# Patient Record
Sex: MALE | Race: BLACK OR AFRICAN AMERICAN | NOT HISPANIC OR LATINO | Employment: UNEMPLOYED
[De-identification: names, ages, dates, MRNs, and addresses within clinical notes are randomized per-mention and may not be internally consistent; named-entity substitution may affect disease eponyms.]

---
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
## 2021-09-07 ENCOUNTER — TELEPHONE (OUTPATIENT)
Dept: FAMILY MEDICINE | Facility: CLINIC | Age: 21
End: 2021-09-07

## 2021-09-07 NOTE — TELEPHONE ENCOUNTER
Reason for call:  Other     Patient called regarding (reason for call): appointment    Additional comments: Returning pt wanting to see Dr. Boo only, for mental health, care connection cannot schedule with Dr. Boo as pt is considered new pt    Phone number to reach patient:  Other phone number:  Brother Brook [phone]    Best Time:  Any     Can we leave a detailed message on this number?  YES    Travel screening: Not Applicable

## 2021-09-09 ENCOUNTER — NURSE TRIAGE (OUTPATIENT)
Dept: NURSING | Facility: CLINIC | Age: 21
End: 2021-09-09

## 2021-09-09 NOTE — TELEPHONE ENCOUNTER
"  \"I need to make an appt for mental health issues.\"   See previous encounter per epic, pt is returning a call to the clinic.\"   Offered triage  Patient declined   Transferred to scheduling for appt but also advised if he needs triaged or to speak with a nurse to call back  Bertha Prado RN [facility]      Reason for Disposition    Requesting regular office appointment    Protocols used: INFORMATION ONLY CALL - NO TRIAGE-A-AH      "

## 2021-09-10 NOTE — TELEPHONE ENCOUNTER
Pt has an appt with Dr. CARDOSO on 09/15/2021 and Dr. MILLER on 10/05/2021    Edilma Castillo/GUS  [place]---[facility]

## 2023-09-08 ENCOUNTER — HOSPITAL ENCOUNTER (EMERGENCY)
Facility: CLINIC | Age: 23
Discharge: HOME | End: 2023-09-08
Attending: STUDENT IN AN ORGANIZED HEALTH CARE EDUCATION/TRAINING PROGRAM | Admitting: STUDENT IN AN ORGANIZED HEALTH CARE EDUCATION/TRAINING PROGRAM
Payer: COMMERCIAL

## 2023-09-08 VITALS
WEIGHT: 281.22 LBS | OXYGEN SATURATION: 100 % | HEART RATE: 107 BPM | TEMPERATURE: 98.3 F | SYSTOLIC BLOOD PRESSURE: 138 MMHG | DIASTOLIC BLOOD PRESSURE: 75 MMHG | RESPIRATION RATE: 18 BRPM

## 2023-09-08 DIAGNOSIS — F20.9 SCHIZOPHRENIA, UNSPECIFIED TYPE (H): ICD-10-CM

## 2023-09-08 DIAGNOSIS — Z76.0 ENCOUNTER FOR MEDICATION REFILL: ICD-10-CM

## 2023-09-08 PROCEDURE — 250N000011 HC RX IP 250 OP 636: Performed by: STUDENT IN AN ORGANIZED HEALTH CARE EDUCATION/TRAINING PROGRAM

## 2023-09-08 PROCEDURE — 96372 THER/PROPH/DIAG INJ SC/IM: CPT | Performed by: STUDENT IN AN ORGANIZED HEALTH CARE EDUCATION/TRAINING PROGRAM

## 2023-09-08 PROCEDURE — 99284 EMERGENCY DEPT VISIT MOD MDM: CPT | Mod: 25

## 2023-09-08 RX ORDER — HALOPERIDOL DECANOATE 100 MG/ML
75 INJECTION INTRAMUSCULAR ONCE
Status: COMPLETED | OUTPATIENT
Start: 2023-09-08 | End: 2023-09-08

## 2023-09-08 RX ADMIN — HALOPERIDOL DECANOATE 75 MG: 100 INJECTION INTRAMUSCULAR at 22:04

## 2023-09-08 ASSESSMENT — ACTIVITIES OF DAILY LIVING (ADL): ADLS_ACUITY_SCORE: 33

## 2023-09-08 NOTE — ED TRIAGE NOTES
Pt arrives for Haldol injection that he was unable to go to this week. ABCs intact6 and Aox4.      Triage Assessment       Row Name 09/08/23 3327       Triage Assessment (Adult)    Airway WDL WDL       Respiratory WDL    Respiratory WDL WDL       Cardiac WDL    Cardiac WDL WDL

## 2023-09-09 NOTE — ED PROVIDER NOTES
"  History     Chief Complaint:  IV Medication     The history is provided by the patient and a parent.      Moe Doe is a 22 year old male with history of paranoid schizophrenia who presents to the ED with his mother for evaluation of an IV medication. Patient's mom reports that she called the doctor and was told that the patient needed a Haldol injection. She states his last dose was in April and the patient does not want to go back for the injection. Mother reports the patient is becoming paranoid, is not sleeping, and is \"getting crazier\". Patient himself reports that he is eating, drinking, and sleeping good. Denies hallucinations. Denies suicidal or homicidal ideations. Denies drug use.     Independent Historian:   Mother - They report as noted above.    Review of External Notes:   Reviewed prior ED note for Haldol injection in 2022.    Medications:    Albuterol  Zyprexa   Haldol     Past Medical History:    Asthma  Behavior disturbance   Epistaxis  Sleep disorder  Paranoid schizophrenia   Agitation  Psychosis   Learning difficulty     Past Surgical History:    ENT surgery    Physical Exam   Patient Vitals for the past 24 hrs:   BP Temp Temp src Pulse Resp SpO2 Weight   09/08/23 1840 138/75 98.3  F (36.8  C) Oral 107 18 100 % --   09/08/23 1837 -- -- -- -- -- -- 127.6 kg (281 lb 3.5 oz)      Physical Exam  GENERAL: Patient well-appearing.  Sitting, playing on his phone.  HEAD: Atraumatic.  EYE: PERRL  NECK: No rigidity  CV: Tachycardic and regular.  PULM: CTAB with good aeration; no retractions, rales, rhonchi, or wheezing  ABD: Soft, nontender, nondistended, no guarding  DERM: No rash. Skin warm and dry  EXTREMITY: Moving all extremities without difficulty. No calf tenderness or peripheral edema      Emergency Department Course   Emergency Department Course & Assessments:     Interventions:  Medications   haloperidol decanoate (HALDOL DECANOATE) injection 75 mg (75 mg Intramuscular $Given 9/8/23 5608)    "   Assessments/Consultations/Discussion of Management or Tests:  ED Course as of 09/08/23 2248   Fri Sep 08, 2023   2100 I obtained history and examined the patient as noted above.    Discussed with ED pharmacist.    Social Determinants of Health affecting care:   Medication compliance.    Disposition:  The patient was discharged to home.     Impression & Plan    Medical Decision Making:  Patient presenting for long-acting Haldol injection.  Per prior ED notes, he was given 75 mg of the long-acting version in the past.    Chronic condition complicating -schizophrenia    DDx acute decompensation, medication noncompliance, among others.    Medical screening exam performed.  Patient in no acute distress.  He is tachycardic but he has no acute complaints.  Tolerating p.o.  Afebrile.    Patient denies being suicidal.  Discussed with the mother thoroughly and she does not feel that he needs a psychiatric evaluation right now, she states he has a psychiatrist to follow-up with.  She states that she just would like him to get the medication and he does not want to take it at the clinic.    Discussed with pharmacist who assisted with the correct medication and dosing.  Given 75 mg IM long-acting Haldol.    Patient monitored.     Patient was evaluated for acute medical emergencies. Based on my clinical assessment, I do not think any further acute management or work-up is required.  Patient stable for discharge. Given strict return precautions. All questions answered. Mother content with plan. Recommended PCP follow-up in 2-3 days.      Diagnosis:    ICD-10-CM    1. Schizophrenia, unspecified type (H)  F20.9       2. Encounter for medication refill  Z76.0          Scribe Disclosure:  STONE BURNETTE, am serving as a scribe at 9:13 PM on 9/8/2023 to document services personally performed by Andrew Padilla MD based on my observations and the provider's statements to me.     9/8/2023   Andrew Padilla MD Foss, Kevin, MD  09/08/23  [id number]

## 2023-09-09 NOTE — DISCHARGE INSTRUCTIONS
Return to the emergency department if symptoms are worsening, become concerning, or for any other concerns.  Follow-up with your psychiatrist next week.

## 2023-09-28 ENCOUNTER — MEDICAL CORRESPONDENCE (OUTPATIENT)
Dept: HEALTH INFORMATION MANAGEMENT | Facility: CLINIC | Age: 23
End: 2023-09-28
Payer: COMMERCIAL

## 2023-10-09 ENCOUNTER — TELEPHONE (OUTPATIENT)
Dept: BEHAVIORAL HEALTH | Facility: CLINIC | Age: 23
End: 2023-10-09
Payer: COMMERCIAL

## 2023-10-09 ENCOUNTER — HOSPITAL ENCOUNTER (EMERGENCY)
Facility: CLINIC | Age: 23
Discharge: PSYCHIATRIC OR SUBSTANCE USE FACILITY | End: 2023-10-13
Attending: EMERGENCY MEDICINE | Admitting: EMERGENCY MEDICINE
Payer: COMMERCIAL

## 2023-10-09 DIAGNOSIS — F23: ICD-10-CM

## 2023-10-09 PROBLEM — F20.9 SCHIZOPHRENIA (H): Status: ACTIVE | Noted: 2022-02-22

## 2023-10-09 PROBLEM — F29 PSYCHOSIS, UNSPECIFIED PSYCHOSIS TYPE (H): Status: ACTIVE | Noted: 2023-09-18

## 2023-10-09 LAB
ALBUMIN SERPL BCG-MCNC: 4 G/DL (ref 3.5–5.2)
ALP SERPL-CCNC: 73 U/L (ref 40–129)
ALT SERPL W P-5'-P-CCNC: 21 U/L (ref 0–70)
ANION GAP SERPL CALCULATED.3IONS-SCNC: 8 MMOL/L (ref 7–15)
AST SERPL W P-5'-P-CCNC: 21 U/L (ref 0–45)
BASO+EOS+MONOS # BLD AUTO: NORMAL 10*3/UL
BASO+EOS+MONOS NFR BLD AUTO: NORMAL %
BASOPHILS # BLD AUTO: 0.1 10E3/UL (ref 0–0.2)
BASOPHILS NFR BLD AUTO: 1 %
BILIRUB SERPL-MCNC: 0.5 MG/DL
BUN SERPL-MCNC: 7.5 MG/DL (ref 6–20)
CALCIUM SERPL-MCNC: 9.2 MG/DL (ref 8.6–10)
CHLORIDE SERPL-SCNC: 106 MMOL/L (ref 98–107)
CREAT SERPL-MCNC: 0.91 MG/DL (ref 0.67–1.17)
DEPRECATED HCO3 PLAS-SCNC: 28 MMOL/L (ref 22–29)
EGFRCR SERPLBLD CKD-EPI 2021: >90 ML/MIN/1.73M2
EOSINOPHIL # BLD AUTO: 0.5 10E3/UL (ref 0–0.7)
EOSINOPHIL NFR BLD AUTO: 6 %
ERYTHROCYTE [DISTWIDTH] IN BLOOD BY AUTOMATED COUNT: 14.9 % (ref 10–15)
FLUAV RNA SPEC QL NAA+PROBE: NEGATIVE
FLUBV RNA RESP QL NAA+PROBE: NEGATIVE
GLUCOSE SERPL-MCNC: 78 MG/DL (ref 70–99)
HCT VFR BLD AUTO: 44.2 % (ref 40–53)
HGB BLD-MCNC: 14.6 G/DL (ref 13.3–17.7)
IMM GRANULOCYTES # BLD: 0 10E3/UL
IMM GRANULOCYTES NFR BLD: 0 %
LYMPHOCYTES # BLD AUTO: 2.5 10E3/UL (ref 0.8–5.3)
LYMPHOCYTES NFR BLD AUTO: 30 %
MCH RBC QN AUTO: 29.4 PG (ref 26.5–33)
MCHC RBC AUTO-ENTMCNC: 33 G/DL (ref 31.5–36.5)
MCV RBC AUTO: 89 FL (ref 78–100)
MONOCYTES # BLD AUTO: 0.8 10E3/UL (ref 0–1.3)
MONOCYTES NFR BLD AUTO: 10 %
NEUTROPHILS # BLD AUTO: 4.4 10E3/UL (ref 1.6–8.3)
NEUTROPHILS NFR BLD AUTO: 53 %
NRBC # BLD AUTO: 0 10E3/UL
NRBC BLD AUTO-RTO: 0 /100
PLATELET # BLD AUTO: 291 10E3/UL (ref 150–450)
POTASSIUM SERPL-SCNC: 4.3 MMOL/L (ref 3.4–5.3)
PROT SERPL-MCNC: 7 G/DL (ref 6.4–8.3)
RBC # BLD AUTO: 4.97 10E6/UL (ref 4.4–5.9)
RSV RNA SPEC NAA+PROBE: NEGATIVE
SARS-COV-2 RNA RESP QL NAA+PROBE: NEGATIVE
SODIUM SERPL-SCNC: 142 MMOL/L (ref 135–145)
WBC # BLD AUTO: 8.2 10E3/UL (ref 4–11)

## 2023-10-09 PROCEDURE — 250N000013 HC RX MED GY IP 250 OP 250 PS 637: Performed by: EMERGENCY MEDICINE

## 2023-10-09 PROCEDURE — 36415 COLL VENOUS BLD VENIPUNCTURE: CPT | Performed by: EMERGENCY MEDICINE

## 2023-10-09 PROCEDURE — 99285 EMERGENCY DEPT VISIT HI MDM: CPT | Mod: 25

## 2023-10-09 PROCEDURE — 87637 SARSCOV2&INF A&B&RSV AMP PRB: CPT | Performed by: EMERGENCY MEDICINE

## 2023-10-09 PROCEDURE — 80053 COMPREHEN METABOLIC PANEL: CPT | Performed by: EMERGENCY MEDICINE

## 2023-10-09 PROCEDURE — 94640 AIRWAY INHALATION TREATMENT: CPT

## 2023-10-09 PROCEDURE — 85025 COMPLETE CBC W/AUTO DIFF WBC: CPT | Performed by: EMERGENCY MEDICINE

## 2023-10-09 RX ORDER — SENNOSIDES A AND B 8.6 MG/1
8.6 TABLET, FILM COATED ORAL 2 TIMES DAILY PRN
Status: ON HOLD | COMMUNITY
Start: 2023-10-02 | End: 2023-11-06

## 2023-10-09 RX ORDER — ACETAMINOPHEN 500 MG
1000 TABLET ORAL ONCE
Status: COMPLETED | OUTPATIENT
Start: 2023-10-09 | End: 2023-10-09

## 2023-10-09 RX ORDER — FLUTICASONE PROPIONATE 50 MCG
1 SPRAY, SUSPENSION (ML) NASAL 2 TIMES DAILY
Status: DISCONTINUED | OUTPATIENT
Start: 2023-10-09 | End: 2023-10-13 | Stop reason: HOSPADM

## 2023-10-09 RX ORDER — OLANZAPINE 5 MG/1
10 TABLET, ORALLY DISINTEGRATING ORAL 2 TIMES DAILY PRN
Status: DISCONTINUED | OUTPATIENT
Start: 2023-10-09 | End: 2023-10-13 | Stop reason: HOSPADM

## 2023-10-09 RX ORDER — FLUTICASONE PROPIONATE 50 MCG
1 SPRAY, SUSPENSION (ML) NASAL 2 TIMES DAILY
Status: ON HOLD | COMMUNITY
Start: 2022-08-15 | End: 2023-11-06

## 2023-10-09 RX ORDER — HALOPERIDOL DECANOATE 100 MG/ML
100 INJECTION INTRAMUSCULAR
Status: ON HOLD | COMMUNITY
Start: 2023-10-06 | End: 2023-11-06

## 2023-10-09 RX ORDER — POLYETHYLENE GLYCOL 3350 17 G/17G
17 POWDER, FOR SOLUTION ORAL DAILY
Status: ON HOLD | COMMUNITY
Start: 2023-09-30 | End: 2023-11-06

## 2023-10-09 RX ORDER — FLUTICASONE FUROATE AND VILANTEROL 200; 25 UG/1; UG/1
1 POWDER RESPIRATORY (INHALATION) DAILY
Status: DISCONTINUED | OUTPATIENT
Start: 2023-10-09 | End: 2023-10-13 | Stop reason: HOSPADM

## 2023-10-09 RX ORDER — POLYETHYLENE GLYCOL 3350 17 G/17G
17 POWDER, FOR SOLUTION ORAL DAILY
Status: DISCONTINUED | OUTPATIENT
Start: 2023-10-10 | End: 2023-10-13 | Stop reason: HOSPADM

## 2023-10-09 RX ORDER — BUDESONIDE AND FORMOTEROL FUMARATE DIHYDRATE 160; 4.5 UG/1; UG/1
2 AEROSOL RESPIRATORY (INHALATION)
Status: ON HOLD | COMMUNITY
End: 2023-11-06

## 2023-10-09 RX ORDER — ERGOCALCIFEROL 1.25 MG/1
50000 CAPSULE, LIQUID FILLED ORAL WEEKLY
Status: ON HOLD | COMMUNITY
Start: 2023-10-05 | End: 2023-11-06

## 2023-10-09 RX ORDER — SENNOSIDES 8.6 MG
1 TABLET ORAL 2 TIMES DAILY PRN
Status: DISCONTINUED | OUTPATIENT
Start: 2023-10-09 | End: 2023-10-13 | Stop reason: HOSPADM

## 2023-10-09 RX ADMIN — FLUTICASONE FUROATE AND VILANTEROL TRIFENATATE 1 PUFF: 200; 25 POWDER RESPIRATORY (INHALATION) at 15:47

## 2023-10-09 RX ADMIN — ACETAMINOPHEN TAB 500 MG 1000 MG: 500 TAB at 01:46

## 2023-10-09 RX ADMIN — OLANZAPINE 10 MG: 5 TABLET, ORALLY DISINTEGRATING ORAL at 16:00

## 2023-10-09 RX ADMIN — OLANZAPINE 10 MG: 5 TABLET, ORALLY DISINTEGRATING ORAL at 01:45

## 2023-10-09 RX ADMIN — FLUTICASONE PROPIONATE 1 SPRAY: 50 SPRAY, METERED NASAL at 21:46

## 2023-10-09 ASSESSMENT — ACTIVITIES OF DAILY LIVING (ADL)
ADLS_ACUITY_SCORE: 35

## 2023-10-09 NOTE — ED NOTES
RN ED Mental Health Handoff Note    STERLING    Does patient require 1:1? No    Hold and rights been given and documented for patient: Yes    Is the patient in BH scrubs? No -Patient has own hat    Has the patient been searched? Yes    Is the 15 minute observation tool up to date? Yes    Was patient issued a welcome folder? Yes    Room check completed this shift: Yes    PSS3 and [place] Assessment/Reassessment this shift:    PSS-3      Date and Time Over the past 2 weeks have you felt down, depressed, or hopeless? Over the past 2 weeks have you had thoughts of killing yourself? Have you ever attempted to kill yourself? When did this last happen? User   10/09/23 0108 yes no no -- MM            Behavioral status of patient: Green    Code 21 called this shift? No    Use of restraints/seclusion this shift? No    Most recent vital signs:  Temp: 97.5  F (36.4  C)   BP: (!) 156/85 Pulse: 100   Resp: 18 SpO2: 100 %        Medications:  Scheduled medication compliance? Yes    PRN Meds administered this shift? Yes    Medications   OLANZapine zydis (zyPREXA) ODT tab 10 mg (10 mg Oral $Given 10/9/23 0145)   acetaminophen (TYLENOL) tablet 1,000 mg (1,000 mg Oral $Given 10/9/23 0146)         ADLs    Meal Provided this shift? No    Hygiene items provided? No    ADLs completed? No    Date of last shower: Unknown    Any significant events this shift? No    Any information that would be helpful in caring for this patient?  N/A    Family present/updated? No    Location of patient's belongings: DEC    Critical Care Minutes:  Does the patient need critical care minutes documented? No

## 2023-10-09 NOTE — PHARMACY-ADMISSION MEDICATION HISTORY
Pharmacist Admission Medication History    Admission medication history is complete. The information provided in this note is only as accurate as the sources available at the time of the update.    Information Source(s[facility]/A    Pertinent Information: Patient currently refusing to particpate in DEC assessment and so did not attempt med hx interview. Pt was just discharged from [facility] on 10/2/23 after being admitted there since 9/16/23.   List updated using [facility] discharge records. Unable to verify last doses of medications.    Based on chart review, pt did receive his Haldol Deconate injection 10/6 based on home care visit in chart on 10/6/23. Next due in 4 weeks.     Changes made to PTA medication list:  Added: All PTA meds were added  Deleted: albuterol neb, olanzapine, APAP  Changed: None    Medication Affordability:  Not including over the counter (OTC) medications, was there a time in the past 3 months when you did not take your medications as prescribed because of cost?: Unable to Assess    Allergies reviewed with patient and updates made in EHR: unable to assess    Medication History Completed By:   Lamar Alston, PharmD, [facility]   Emergency Medicine Pharmacist  [phone] or Joni  October 9, 2023    PTA Med List   Medication Sig Last Dose    budesonide-formoterol (SYMBICORT) 160-4.5 MCG/ACT Inhaler Inhale 2 puffs into the lungs two times daily Past Week at -    fluticasone (FLONASE) 50 MCG/ACT nasal spray Spray 1 spray into both nostrils 2 times daily Past Week at -    haloperidol decanoate (HALDOL DECANOATE) 100 MG/ML injection Inject 100 mg into the muscle every 28 (twenty-eight) days 10/8/2023 at 1035    polyethylene glycol (MIRALAX) 17 GM/Dose powder Take 17 g by mouth daily Past Week at -    senna (SENOKOT) 8.6 MG tablet Take 8.6 mg by mouth 2 times daily as needed for constipation prn at prn    vitamin D2 (ERGOCALCIFEROL) 46409 units (1250 mcg)  capsule Take 50,000 Units by mouth once a week Past Week at -
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
## 2023-10-09 NOTE — PLAN OF CARE
Moe Doe  October 9, 2023  Plan of Care Hand-off Note     Patient Care Path: inpatient mental health    Plan for Care:   Pt is a 22 year old male with a history of schizophrenia who is brought into to ED by father due to decompensation in MH since recent discharge from [facility] last week. Pt is presenting as non-compliant, requesting to return home, showing bizarre behavior, odd responses such as laughing out loud inappropriately, disorganized, verbally abrasive and having active psychosis concerns. Per collateral, pt has been making ideation comments and has had increased aggression in the home by breaking items and punching walls. Pt is currently on a commitment with [facility], who was contacted today and supports admission for MH though recommended 72HH hold until paperwork can be finished to support such care. CW is requesting [facility] for continuation of care, though is aware with 72HH pt can be placed aware. Intake was called and is aware of [facility] preference per commitment . Pt displays the following risk factors that support IP admission: Recent discharge from hospital, psychosis concerns and large decompensation in behavior. Pt is unable to engage in safety planning to mitigate risk level in a non-secure setting. Lower levels of care have not been effective in mitigating risk. Due to this IP is the least restrictive option of care for pt. Pt should remain in IP until deemed safe to return to the community and engage in OP  supports.    Identified Goals and Safety Issues: Goals: Stabilize in psychosis symptoms, show ability to maintain fluent conversation and understanding of needs, stabilize in medications, reduce verbal and physical aggression. Seek out care through [facility] in-patient services. Safety: History of physical aggression towards others through punching/ hitting. Active abrasive verbal aggression, suicidal ideation per collateral, disorganized speech, active psychosis symptoms, on a  commitment for MI. Recent destruction towards walls/ doors in home.  t.    Overview:  Commitment  With Mental Health Resources: Maryam Miranda [phone]   Father of Patient,Jostin, [phone]        Legal Status: Legal Status at Admission: Commitment (ED will place pt on 72HH once STERLING is up, Hold requested by county until they can submit paperwork to support admission for MH.)    Psychiatry Consult: N/A       Updated  MD and RN regarding plan of care.          Angelina Ding LPC

## 2023-10-09 NOTE — ED NOTES
RN ED Mental Health Handoff Note    72 hour hold; working on commitment     Does patient require 1:1? No    Hold and rights been given and documented for patient: Yes    Is the patient in  scrubs? Yes    Has the patient been searched? Yes    Is the 15 minute observation tool up to date? Yes    Was patient issued a welcome folder? Yes    Room check completed this shift: Yes    PSS3 and [place] Assessment/Reassessment this shift:    PSS-3      Date and Time Over the past 2 weeks have you felt down, depressed, or hopeless? Over the past 2 weeks have you had thoughts of killing yourself? Have you ever attempted to kill yourself? When did this last happen? User   10/09/23 0108 yes no no -- MM          C-SSRS ([place])      Date and Time Q1 Wished to be Dead (Past Month) Q2 Suicidal Thoughts (Past Month) Q3 Suicidal Thought Method Q4 Suicidal Intent without Specific Plan Q5 Suicide Intent with Specific Plan Q6 Suicide Behavior (Lifetime) Within the Past 3 Months? RETIRED: Level of Risk per Screen Screening Not Complete User   10/09/23 1157 -- -- -- -- -- no -- -- -- NG   10/09/23 1022 yes yes yes no no -- -- -- -- NG            Behavioral status of patient: Green    Code 21 called this shift? No    Use of restraints/seclusion this shift? No    Most recent vital signs:  Temp: 97.5  F (36.4  C)   BP: 125/85 Pulse: 98   Resp: 12 SpO2: 100 % O2 Device: None (Room air)      Medications:  Scheduled medication compliance? Yes    PRN Meds administered this shift? Yes    Medications   OLANZapine zydis (zyPREXA) ODT tab 10 mg (10 mg Oral $Given 10/9/23 1600)   fluticasone-vilanterol (BREO ELLIPTA) 200-25 MCG/ACT inhaler 1 puff (1 puff Inhalation $Given 10/9/23 6137)   fluticasone (FLONASE) 50 MCG/ACT spray 1 spray (has no administration in time range)   polyethylene glycol (MIRALAX) Packet 17 g (has no administration in time range)   sennosides (SENOKOT) tablet 1 tablet (has no administration in time range)   acetaminophen  (TYLENOL) tablet 1,000 mg (1,000 mg Oral $Given 10/9/23 6631)         ADLs    Meal Provided this shift? Yes    Hygiene items provided? Yes    ADLs completed? Yes    Date of last shower: unknown; attempted; not enough security lack of staff; should have enough tomorrow morning.    Any significant events this shift? No    Any information that would be helpful in caring for this patient?  Ear complaints resolved. Offer options, enjoys snacks (enjoys rice crispy bars), keep back to door, calming words. Father helps calm, very supportive. Talks to self, enjoys TV.  Heath may call; good rapport with pt.     Family present/updated? Yes    Location of patient's belongings: room 5    Critical Care Minutes:  Does the patient need critical care minutes documented? No
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
## 2023-10-09 NOTE — ED NOTES
Patient cooperative with room change, security at bedside searched/wanded and belongings locked in DEC office. Sitter in place, STERLING hold information given. Blood drawn, patient seemed to be having internal conversation and laughing inappropriately.

## 2023-10-09 NOTE — ED TRIAGE NOTES
"Pt presents to triage with c/o mental health problem. Pt not making much sense in triage. Pt sat down said \"I have a problem with my hearing. Heroin, ever since I was born.\" Pt very off topic, asking RN about music and movie. Endorses SI, but will not elaborate. Denies drugs or alcohol. Also endorses ear pain.      "

## 2023-10-09 NOTE — ED NOTES
10/9/2023  Moe Doe 2000     Writer consulted with ED provider, Art- nurse of duty,  on this date at  6:30 AM. It was determined that pt would not benefit from assessment at this time due to Pt unable able to participate in assessment.  Art indicated that a DEC assessment at this time would not be very effective.  It was agreed that ED staff will call later when they want to attempt assessment.     ED will call DEC at [phone] when pt is ready and able to participate in assessment.     OR DEC order has been closed at this time.    Jocelyn Kehr Sparby, [facility], LADC

## 2023-10-09 NOTE — ED PROVIDER NOTES
At signout pt was awaiting DEC evaluation.     11:13 AM: DEC called to report assessment. Pt is clearly disorganized and meets criteria for psychiatric admission.  DEC is working to reach out to the county to determine patient's commitment status and if further hold status is necessary while awaiting psychiatric bed placement here in the ER.     Inderjit Costello MD  10/09/23 [id number]

## 2023-10-09 NOTE — ED NOTES
Angelina ESPINOZA updated writer. Pt to go to [facility] per doretha Joe. Pt has good rapport with her and pt has hx at [facility].  Also working on commitment.

## 2023-10-09 NOTE — TELEPHONE ENCOUNTER
S: [facility] ED , DEC  Khadra  calling at 11:30 PM about a 22 year old/Male presenting with Psychosis     B: Pt arrived via Family. Presenting problem, stressors: Pt BIB to ED by father for active psychosis.  Pt is talking to himself, hitting himself and presenting as labile, disorganized, with bizarre behavior.  Pt was just dsc from [facility] 10/2/23 and Pt's  [facility] [place] [facility].   is working on revoking PD on Pt's commitment and Pt is currently on a 72 HH due to attempting to elope from ED.  Pt currently refusing to cooperate on labs.      Pt affect in ED: Labile  Pt Dx: Schizophrenia  Previous IPMH hx? Yes: just dsc from [facility] 10/2/23  Pt unable to be assessed for SI due to current presentation  but was telling people that he wanted to kill himself  Hx of suicide attempt? No  Pt unable to be assessed for SIB due to current presentation    Pt unable to be assessed for HI due to current presentation    Pt endorses auditory hallucinations  and endorses visual hallucination .   Pt RARS Score: 9    Hx of aggression/violence, sexual offenses, legal concerns, Epic care plan? describe: Hx of hitting people; punching  Current concerns for aggression this visit? No  Does pt have a history of Civil Commitment? Yes, currently committed, revocation pending   Is Pt their own guardian? Yes    Pt is prescribed medication. Is patient medication compliant? Yes Pt has injections  Pt endorses OP services: Psychiatrist and   CD concerns: Actively using/consuming Delta 8 THC  Acute or chronic medical concerns: Asthma with an inhaler as needed  Does Pt present with specific needs, assistive devices, or exclusionary criteria? None      Pt is ambulatory  Pt is able to perform ADLs independently      A: Pt to be reviewed for [facility] admission.72 HH  10/09/23 11:35 AM   Preferred placement: Statewide    COVID Symptoms: No  If yes, COVID test required   Utox: Ordered, not yet collected   CMP: Ordered,  not yet collected   CBC: Ordered, not yet collected   HCG: N/A    R: Patient cleared and ready for behavioral bed placement: Yes  Pt placed on IP worklist? Yes    Does Patient need a Transfer Center request created? Yes, writer completed Transfer Center request at:  11:49 AM    [facility] Bed Call Log 10/9/23 @4PM:   Intake has called facilities that have not updated the bed status within the last 12 hours.                               [facility] is at capacity.              [facility] is posting 0 beds. [phone]. Per call at 7:13 am to [facility], they are full.   [facility] is posting 0 beds. Negative covid required.                 [facility] is posting 0 bed. Negative covid required. [phone].  per call at 7:41 am to [place], they are at cap.    [facility] is posting 0 beds.        [facility] is posting 0 beds. [phone].  Per call with Chaya, no beds available   [facility] is posting 1 bed for young adults ages 18-25. Call for details. Negative covid.  [phone]. Per call @6:09 PM no psychosis and F bed  [facility] is posting 0 beds           [facility] ([facility] is posting 0 beds.          [facility] is posting 3 beds. LOW acuity ONLY. Mixed unit 12+. Negative covid- [phone].     [facility] has 0 beds posted. No aggression. Negative Covid. Low acuity.      [facility] is posting 0 beds. Negative covid. [phone].  per call at 7:18 am,  said NOT to leave a message; no option to speak with a live rep    [facility] ([place]) is posting 4 beds. Low acuity only. Negative covid.  [phone].  Pt is too acute.  [facility] is posting 2 beds. Low acuity. No current aggression.  [phone]  Pt is too acute.  [facility] ([place]) is posting 1 bed available. Negative covid.  [phone].   Mood disorder, Very low acuity.   Pt is too acute.  [facility] is posting 1 bed. Low  acuity. 72 HH hold preferred. Negative covid required. [phone] ; per call at 7:20 am to Randi, they are at cap.   [facility] ([place]) is posting 1 bed.  Low acuity only. Negative covid.  [phone].    Pt is too acute.     [facility] in [place] is posting 5 beds.  Negative covid required.   Vol only, No history of aggression, violence, or assault. No sexual offenders. No 72 HH holds. [phone].   Pt is too acute and not Vol  [facility] is posting 8 beds. Negative covid required.  (Must have the cognitive ability to do programming. No aggressive or violent behavior or recent HX in the last 2 yrs. MH must be primary.) Always low acuity. [phone]    [facility] has 0 beds posted. Negative covid required.  Low acuity only. Violence and aggression capped.  [phone]. Low acuity only.     [facility] is posting 1 bed. Low acuity, Negative covid required.  [phone]. Per call at 7:23 am to Edgar, they are at cap but we can call later.    [facility] is posting 4 beds. Negative covid required.  Vol only. Combined adolescent and adult unit. No aggressive or violent behavior. No registered sex offenders.  [phone]. Per call at 7:24 am to Zoila, they have  a couple beds.   Pt is too acute and not Vol  [facility] posting 0 bed. Negative covid required.  [phone] ; per Veronica ANS, they are at cap (per Theodora's call at 6 am).   [facility]. Negative covid. LOW acuity. (No lines, drains, or tubes, oxygen, CPAP, IV, etc.). [phone].  per call at 7:28 am to Veronica, they  potentially  have 1 bed avail.    [facility] is posting 4 beds. No covid test required.  [phone]. Per call at 7:30 am, phone rang numerous times with no answer.  out of state  [facility] ([place]) is posting 5 beds. Negative covid. (No. lines, drains, or tubes, oxygen, CPAP, IV, etc.).  [phone] ; per call at 7:32 am to Rosario, they have 4 low acuity beds avail.  Pt needs to have a ride home        Pt remains on the work list pending appropriate bed availability.

## 2023-10-09 NOTE — ED NOTES
OLGA Alfaro. She attempted to speak with pt. Pt refused and refused to open eyes for assessment and acting asleep. Writer offered snacks and inquired any other needs. Pt accepted snacks but minimal interaction with DEC. Father arrived at bedside at this time and is encouraging son to participate in discussion.

## 2023-10-09 NOTE — ED PROVIDER NOTES
"  History     Chief Complaint:  Mental Health Problem       HPI   Moe Doe is a 22 year old male with history of schizophrenia who presents for mental health evaluation and ear pain.  The patient states that he has been having L. ear pain for the last few days. The patient denies any drug or alcohol usage. States that he used to use \"delta 8\" and states that he hasn't used it in a long time. He admits to visual hallucinations \"I see smoke.\"  He states he wants to hurt himself by \"jumping on a couch.\"  He states \"I don't know if I want to die, maybe I do.\" No fever, cough or other symptoms.     Independent Historian:   The father states that he is worried about the patients safety and the safety of those around him. He states that the patient lives with his mom and today, mom called father and asked him to be taken to ED. The father states that en route, the patient urinated on the curb outside as well as opened the car door while it was moving. He is worried that the patient is not sleeping and would like him to get psychiatric help. Patient was given haldol IM injection on 10/6 at home visit which he receives monthly.    Review of External Notes:   I reviewed the home care visit note from 10/6 where he got 100 mg of Haldol.      Administered Medications - Active Administered Medications - up to 3 most recent administrations  Active Administered Medications - up to 3 most recent administrations  Medication Order MAR Action Action Date Dose Rate Site   haloperidol decanoate (HALDOL DECANOATE) 100 mg/mL IM solution   Disp-1 mL, R-0, Inject 1 mL (100 mg) into a muscle every 4 weeks. Indications: Schizophrenia, Normal  Given 10/06/2023 10:35 AM  mg   Left Deltoid           Medications:    Zyprexa   Albuterol   Haldol     Past Medical History:    Asthma   Schizophrenia   Psychosis   Sleep disorder   Behavior disturbance     Past Surgical History:    ENT surgery      Physical Exam   Patient Vitals for the past " "24 hrs:   BP Temp Pulse Resp SpO2   10/09/23 0107 (!) 156/85 97.5  F (36.4  C) 100 18 100 %        Physical Exam  Nursing note and vitals reviewed.  Constitutional: Well nourished.  Eyes: Conjunctiva normal.  Pupils are equal, round, and reactive to light.   ENT: Nose normal. Mucous membranes pink and moist.  L. TM not visualized 2/2 to cerumen impaction. R. TM normal. No posterior oropharyngeal erythema  Neck: Normal range of motion.  CVS: Normal rate, regular rhythm.  Normal heart sounds.    Pulmonary: Lungs clear to auscultation bilaterally. No wheezes/rales/rhonchi.  GI: Abdomen soft. Nontender, nondistended. No rigidity or guarding.    MSK: Moves all extremities  Neuro: Alert. Follows simple commands.  Skin: Skin is warm and dry. No rash noted.   Psychiatric: Flat affect, admits to visual hallucinations (\"seeing smoke.\")    Emergency Department Course     Laboratory:  Labs Ordered and Resulted from Time of ED Arrival to Time of ED Departure - No data to display       Emergency Department Course & Assessments:       Interventions:  Medications   acetaminophen (TYLENOL) tablet 1,000 mg (has no administration in time range)   OLANZapine zydis (zyPREXA) ODT tab 10 mg (has no administration in time range)      Assessments:  0022 I consulted with the patient and obtained history as shown above   I rechecked the patient and explained exam results       Consultations/Discussion of Management or Tests:  None        Social Determinants of Health affecting care:   None    Disposition:  Care of the patient was transferred to my colleague Dr. Costello pending DEC.     Impression & Plan      Medical Decision Making:  Patient is a 22-year-old male presenting for mental health evaluation.  He also complains of left ear pain on arrival.  He is nontoxic, cooperative at bedside.  He has a flat affect and admits to visual hallucinations.  Father expresses that he tried to jump out of the car on the way here and expresses concern " for mental health decompensation.  Dpubt metabolic derangements or toxidrome.  Patient was medically cleared.  Regarding his ear complaints, noted cerumen impaction.  I did discuss recommendation for irrigation of patient's ear though he is adamantly declining.  Father expressed understanding that cannot exclude underlying otitis media/perforation.  No evidence to suggest otitis externa/mastoiditis based on exam.  He is signed out to my partner pending DEC evaluation.  He is currently on an STERLING.    Diagnosis:    ICD-10-CM    1. Acute schizophrenic episode (H)  F23            Discharge Medications:  New Prescriptions    No medications on file      Scribe Disclosure:  I, Niko Parr, am serving as a scribe at 1:36 AM on 10/9/2023 to document services personally performed by Delilah Madison DO based on my observations and the provider's statements to me.     10/9/2023   Delilah Madison DO McDonald, Lindsey E, DO  10/09/23 1836

## 2023-10-09 NOTE — CONSULTS
Diagnostic Evaluation Consultation  Crisis Assessment    Patient Name: Moe Doe  Age:  22 year old  Legal Sex: male  Gender Identity: male  Pronouns:   Race: Black or   Ethnicity: Not  or   Language: English      Patient was assessed: Virtual: [employer] Crisis Assessment Start Time: 1011 (10:11AM-10:16AM) Crisis Assessment Stop Time: 1051 (10:19AM-10:30AM and again from 10:49AM-10:51AM)  Patient location: [facility] EMERGENCY DEPT                             ED06    Referral Data and Chief Complaint  Moe Doe presents to the ED with family/friends. Patient is presenting to the ED for the following concerns: Verbal agitation, Suicidal ideation, Significant behavioral change.   Factors that make the mental health crisis life threatening or complex are:  Father brought pt into the ED after pt has had large change in presntation, with concerns for psychosis and suicidal ideation..      Informed Consent and Assessment Methods  Explained the crisis assessment process, including applicable information disclosures and limits to confidentiality, assessed understanding of the process, and obtained consent to proceed with the assessment.  Assessment methods included conducting a formal interview with patient, review of medical records, collaboration with medical staff, and obtaining relevant collateral information from family and community providers when available.  : done (Pt is not appropriate to sign BUTCH's due to presentation)     Patient response to interventions: no evidence of understanding, refused to respond  Coping skills were attempted to reduce the crisis:  Unknown. Pt is often refusing to respond or participate. Pt also shows limited insight. No evidence of benefits with validation, rapport gaining, reassurance or education.     History of the Crisis   Information was attempted to be gathered from patient, though due to presentation and decompensation, information  gathered was limited. Collateral from Commitment , parents and patient were obtained in addition to observation with patient. This past evening, pt s mother called pt s father, requesting help bringing pt to ED due to decompensation in behavior. Since being in ED, pt has shown psychosis concerns such as making odd gestures, reporting visual hallucinations and inappropriately laughing. Pt also was reporting SI to ED staff and father.  Pt has been verbally abrasive and refusing most services and labs. During this encounter with LMHP, per nurse, pt had just eaten breakfast and was awake, though when writer went into room, pt placed hat on head, had eyes closed and refused to respond to information outside of  what is this . Pt became non responsive and looked as though he was sleeping. LMHP reattempted again with help of nurse. Pt was brought in snacks and seen sitting up, looking about the room and eating. When nurse was present, pt asked nurse if LMHP was  telling the truth  nurse encouraged him to participate. During this time, pt was asked about SI, pt reported no, then when asked about comments made to family and ED staff, pt then laughed and looked away, continuing to eat. When asked about HI pt stated  yes no, yes, no yes,no  and was unable to confirm answer. LMHP attempted to gain rapport with pt though was limited as pt was not willing to engage and would state he doesn t know where he lives. At one point, pt s father came in the room to help assist with pt to comply with DEC though pt then became slightly agitated stating  why is she asking me personal information  and stated  she is racists  in regards to LMHP. Pts father had to encourage him to participate. Once father left room, LMHP continued to attempt to with patient, though pt would respond with  none of your fucking business  or  you stupid fucking ass . LMHP then decided to observe patient, rather than asking questions due to agitation.  Pt was seen looking about the room, then aggressively turned dead toward the wall and stated  I am going to find you , pt then began inappropriately laughing and was seen engaging in hand movements, almost similar to ASL, though family denied pt knowing this. When LMHP asked what pt was doing, he began putting his finger over his mouth to gesture  shhh  and continued with his hand movements and looking about the room.    Brief Psychosocial History  Family:  Single, Children no  Support System:  Parent(s), Sibling(s)  Employment Status:  unemployed  Source of Income:  other (see comments), public assistance (support from family)  Financial Environmental Concerns:  unemployed  Current Hobbies:   (pt unable to note to this LMHP.)  Barriers in Personal Life:  mental health concerns, behavioral concerns    Significant Clinical History  Current Anxiety Symptoms:     Current Depression/Trauma:  difficulty concentrating, avoidance, thoughts of death/suicide  Current Somatic Symptoms:  anxious  Current Psychosis/Thought Disturbance:     Current Eating Symptoms:   (unable to assess, though pt was seen eating throughout encounter)  Chemical Use History:  Alcohol: None  Benzodiazepines: None  Opiates: None  Cocaine: None  Marijuana:  (Pt reported Delta use, though details on last date of use or method are unknown.)  Last Use::  (Unknown)  Other Use: None  Withdrawal Symptoms:  (Unable to assess.)  Addictions:  (Unable to assess.)   Past diagnosis:  Schizophrenia  Family history:  No known history of mental health or chemical health concerns  Past treatment:  Case management, Civil Commitment, Psychiatric Medication Management, Inpatient Hospitalization, Other (Nurse coming into home for medications)  Details of most recent treatment:  Pt is currently on a commitment through [facility],  is Maryam Miranda [phone]. Pt is suppose to be taking injection medications and attend out-patient supports per commitment  "requirements. Pt has a psychiatrist and has a nurse that comes into the home for his injections.  Other relevant history:  Pt was recently admitted for MH through [facility], Discharged on 10.2.23. Another prior admission through [facility] in September of 2021.  Pt has history of commitment, which is still active. Pt has history of medications and .       Collateral Information  Is there collateral information: Yes     Collateral information name, relationship, phone number:  Morgan Frankel's Father at [phone]    What happened today: Father brought pt at 1AM in ED. Pt has been staying with his mother, though father speaks frequently with them. Mother called father, noting concerns and pt needing to be seen in ED. Pt was trying to jump out of the car, opening windows and doors on way to ED. Father would have to pull over and \"lie to him\" about where they were going to get him to go to hospital. Per father, since being in ED, pt continues to ask to return home.     What is different about patient's functioning: Pt was discharged last week from in-pt mental health. Pt has been getting injection for his MH. Father noted medications have \"not helped. Pt has not been sleeping. Pt has been punching walls, pacing around the house, is not dressing properly and asking questions and making comments that don't make sense. Father reports \"he is in state of crisis\". Per father, pt is \"not logical\" and is not safe to return home due to presentation.     Concern about alcohol/drug use:  Unknown     What do you think the patient needs:  Admission for MH.     Has patient made comments about wanting to kill themselves/others: yes    If d/c is recommended, can they take part in safety/aftercare planning:  no    Additional collateral information:  Pt has history of physical aggression towards family and breaking items in the house such as door. Pt has a commitment . Father believes pt needs help with MH and needing " "admission.  is [facility] through [facility]; [phone]. Pt has a psychiatrist. Unknown if pt uses any substances now, though pt told ED staff some history of this. Pt has history of asthma, and uses inhaler. Pt is not currently working, history of working at [employer] though cannot maintain work due to MI. [facility] spoke pts mother, alley celestin at [phone]. Pt hasn't been sleeping, listening to loud music, Pt often is unable to understand questions when asked. Pt has been talking to self, pacing around the house. Mother noted pt has historically been taking medications (haldol) and was \"doing well' though over the past couple of months has been declining with new prescribed medications. Since discharge from hospital, per mother, Pt has been hearing voices and putting fingers in his ears to stop this. Mother called pt's father due to pt's presentation. Mother noted pt would benefit from increased physical labs to ensure he is physically clear and medication of haldol as he did well on this in the past. Mother also reports Pt has been kicking the wall. [facility] spoke with pts JUAN Miranda [phone] who noted pt was recently discharged from [facility]. CW noted she will work on revocation for in-patient care for MH though if pt is attempting to leave ED, okay for 72HH paperwork to be placed. CW shared that pt would benefit from [facility] care as he historically has gone there for placement.     Risk Assessment  [place] Suicide Severity Rating Scale Full Clinical Version:10.9.23  Suicidal Ideation  Q1 Wish to be Dead (Lifetime): Yes (Pt denies though collateral reports pt was making such comments as does ED in regards to ideations)  Q2 Non-Specific Active Suicidal Thoughts (Lifetime): Yes (pt denies though collateral from ED and family reports otherwise.)  3. Active Suicidal Ideation with any Methods (Not Plan) Without Intent to Act (Lifetime): Yes (Per family, pt has made comments about wanting to jump " to end life)  Q5 Active Suicidal Ideation with Specific Plan and Intent (Lifetime): No (pt denies)  Q6 Suicide Behavior (Lifetime): no     Suicidal Behavior (Lifetime)  Actual Attempt (Lifetime): No  Has subject engaged in non-suicidal self-injurious behavior? (Lifetime): No  Interrupted Attempts (Lifetime): No  Aborted or Self-Interrupted Attempt (Lifetime): No  Preparatory Acts or Behavior (Lifetime): No    [place] Suicide Severity Rating Scale Recent: 10.9.23  Suicidal Ideation (Recent)  Q1 Wished to be Dead (Past Month): yes (Pt denies, though per collateral and ED, pt has made ideation comments.)  Q2 Suicidal Thoughts (Past Month): yes (Pt denies this though per collateral from family and ED, pt has made comments)  Q3 Suicidal Thought Method: yes (Pt told family and ED he had thoughts of jumping to end life.)  Q4 Suicidal Intent without Specific Plan: no  Q5 Suicide Intent with Specific Plan: no  Level of Risk per Screen: moderate risk  Intensity of Ideation (Recent)  Most Severe Ideation Rating (Past 1 Month):  (unable to assess)  Frequency (Past 1 Month):  (unable to assess)  Duration (Past 1 Month):  (unable to assess)  Controllability (Past 1 Month):  (unable to assess)  Deterrents (Past 1 Month):  (unable to assess)  Reasons for Ideation (Past 1 Month):  (unable to assess)  Suicidal Behavior (Recent)  Actual Attempt (Past 3 Months):  (unable to assess)  Has subject engaged in non-suicidal self-injurious behavior? (Past 3 Months):  (unable to assess)  Interrupted Attempts (Past 3 Months):  (unable to assess)  Aborted or Self-Interrupted Attempt (Past 3 Months):  (unable to assess)  Total Number of Aborted or Self-Interrupted Attempts (Past 3 Months):  (unable to assess)  Preparatory Acts or Behavior (Past 3 Months):  (unable to assess)    Environmental or Psychosocial Events: legal issues such as DWI, DUI, lawsuit, CPS involvement, etc., impulsivity/recklessness, neither working nor attending school,  "unemployment/underemployment, work or task failure  Protective Factors: Protective Factors: strong bond to family unit, community support, or employment, lives in a responsibly safe and stable environment, supportive ongoing medical and mental health care relationships    Does the patient have thoughts of harming others? Feels Like Hurting Others: other (see comments) (Pt reported \"yes no yes no\")  Previous Attempt to Hurt Others: yes (Per collateral from family, pt has history of punching and hitting others.)  Current presentation: Irritable, Verbal Threats  Violence Threats in Past 6 Months: Unaware though per chart review, pt has history of physical violence.  Current Violence Plan or Thoughts: Pt unwilling to respond.  Is the patient engaging in sexually inappropriate behavior?: no  Duty to warn initiated: no    Is the patient engaging in sexually inappropriate behavior?  no        Mental Status Exam   Affect: Labile (Pt would go from calm, to agitated physically, to then verbally abrasive to calm again)  Appearance: Appropriate  Attention Span/Concentration: Inattentive (Often would not respond at all to questions and would be seen with eyes close when refusing ot respond.)  Eye Contact: Variable    Fund of Knowledge: Delayed   Language /Speech Content: Non-Fluent  Language /Speech Volume: Soft, Loud (pt would at times whisper to then yell)  Language /Speech Rate/Productions: Minimally Responsive, Pressured  Recent Memory: Poor  Remote Memory: Poor  Mood: Apathetic, Irritable, Angry, Anxious  Orientation to Person: Yes   Orientation to Place: Yes  Orientation to Time of Day: No  Orientation to Date: No     Situation (Do they understand why they are here?): No  Psychomotor Behavior: Hyperactive, Agitated  Thought Content: Hallucinations, Suicidal  Thought Form: Loose Associations, Tangential        Medication  Psychotropic medications:   Medication Orders - Psychiatric (From admission, onward)      Start     " Dose/Rate Route Frequency Ordered Stop    10/09/23 0129  OLANZapine zydis (zyPREXA) ODT tab 10 mg         10 mg Oral 2 TIMES DAILY PRN 10/09/23 0129               Current Care Team  Patient Care Team:  No Ref-Primary, Physician as PCP - General    Diagnosis  Patient Active Problem List   Diagnosis Code    Behavior disturbance F91.9    Epistaxis R04.0    Sleep disorder G47.9    Intermittent asthma J45.20    Psychosis, unspecified psychosis type (H) F29    Schizophrenia (H) F20.9       Primary Problem This Admission  Active Hospital Problems    *Schizophrenia (H)        Clinical Summary and Substantiation of Recommendations   Pt is a 22 year old male with a history of schizophrenia who is brought into to ED by father due to decompensation in MH since recent discharge from [facility] last week. Pt is presenting as non-compliant, requesting to return home, showing bizarre behavior, odd responses such as laughing out loud inappropriately, disorganized, verbally abrasive and having active psychosis concerns. Per collateral, pt has been making ideation comments and has had increased aggression in the home by breaking items and punching walls. Pt is currently on a commitment with [facility], who was contacted today and supports admission for  though recommended 72HH hold until paperwork can be finished to support such care. CW is requesting [facility] for continuation of care, though is aware with 72HH pt can be placed aware. Intake was called and is aware of [facility] preference per commitment . Pt displays the following risk factors that support IP admission: Recent discharge from hospital, psychosis concerns and large decompensation in behavior. Pt is unable to engage in safety planning to mitigate risk level in a non-secure setting. Lower levels of care have not been effective in mitigating risk. Due to this IP is the least restrictive option of care for pt. Pt should remain in IP until deemed safe to return to the  community and engage in [facility] supports.       Imminent risk of harm:  (SI concerns though psychosis as imminent risk.)  Severe psychiatric, behavioral or other comorbid conditions are appropriate for management at inpatient mental health as indicated by at least one of the following: Psychiatric Symptoms, Impaired impulse control, judgement, or insight, Symptoms of impact to function  Severe dysfunction in daily living is present as indicated by at least one of the following: Complete inability to maintain any appropriate aspect of personal responsibility in any adult roles, Other evidence of severe dysfunction, Incapacitation because of grave disability  Situation and expectations are appropriate for inpatient care: Patient is unwilling to participate in treatment voluntarily and requires treatment, Voluntary treatment at lower level of care is not feasible, Around-the-clock medical and nursing care to address symptoms and initiate intervention is required, Patient management/treatment at lower level of care is not feasible or is inappropriate, Need for physical restraint, seclusion, or other involuntary treatment intervention is present  Inpatient mental health services are necessary to meet patient needs and at least one of the following: Specific condition related to admission diagnosis is present and judged likely to further improve at proposed level of care, Specific condition related to admission diagnosis is present and judged likely to deteriorate in absence of treatment at proposed level of care      Patient coping skills attempted to reduce the crisis:  Unknown. Pt is often refusing to respond or participate. Pt also shows limited insight. No evidence of benefits with validation, rapport gaining, reassurance or education.    Disposition  Recommended disposition: Inpatient Mental Health        Reviewed case and recommendations with attending provider. Attending Name: Inderjit Costello MD       Attending  concurs with disposition: yes       Patient and/or validated legal guardian concurs with disposition:   no (Pt requesting to leave. Pt also shows no insight into concerns and presentation.)       Final disposition:  inpatient mental health    Legal status on admission: 72 Hour Hold, Commitment    Assessment Details   Total duration spent with the patient: 19 min     CPT code(s) utilized: Non-Billable    JOVI Iglesias, Psychotherapist  DEC - Triage & Transition Services  Callback: [phone]

## 2023-10-09 NOTE — ED NOTES
"Tried to recollect blood labs, but patient very suspicious, guarded, and asked nurse, \"Are you dumb? Did you fly or walk here? Are you a genious? Ya, that's what I thought, get the f*ck out of my face.\"    Writer updated provider that unable to collect labs. Provider said to cancel lab orders, and to work on moving patient to one of the green pod rooms due to history of violence. Patient moved to room 6 from 10.  "

## 2023-10-09 NOTE — ED NOTES
Patient moved to ED6. Asking for water and gesturing for writer to look under his hat. Unable to explain what he is doing.

## 2023-10-09 NOTE — ED NOTES
Prior nurse attempted to recollect labs and patient began cursing and refused. Provider at that time cancelled the lab work per lab. Will attempt to recollect. Re-attempted lab draw. Draw was missed. Patient refused further attempts at this time. Provider aware.

## 2023-10-09 NOTE — ED NOTES
IP MH Referral Acuity Rating Score (RARS)    LMHP complete at referral to IP MH, with DEC; and, daily while awaiting IP MH placement. Call score to PPS.  CRITERIA SCORING   New 72 HH and Involuntary for IP MH (not adolescent) 1/1   Boarding over 24 hours 0/1   Vulnerable adult at least 55+ with multiple co morbidities; or, Patient age 11 or under 0/1   Suicide ideation without relief of precipitating factors 0/1   Current plan for suicide 0/1   Current plan for homicide 0/1   Imminent risk or actual attempt to seriously harm another without relief of factors precipitating the attempt 1/1   Severe dysfunction in daily living (ex: complete neglect for self care, extreme disruption in vegetative function, extreme deterioration in social interactions) 1/1   Recent (last 2 weeks) or current physical aggression in the ED 1/1   Restraints or seclusion episode in ED 1/1   Verbal aggression, agitation, yelling, etc., while in the ED 1/1   Active psychosis with psychomotor agitation or catatonia 1/1   Need for constant or near constant redirection (from leaving, from others, etc).  1/1   Intrusive or disruptive behaviors 1/1   TOTAL Acuity Total Score: 9

## 2023-10-10 ENCOUNTER — TELEPHONE (OUTPATIENT)
Dept: BEHAVIORAL HEALTH | Facility: CLINIC | Age: 23
End: 2023-10-10
Payer: COMMERCIAL

## 2023-10-10 PROCEDURE — 250N000013 HC RX MED GY IP 250 OP 250 PS 637: Performed by: EMERGENCY MEDICINE

## 2023-10-10 PROCEDURE — 96372 THER/PROPH/DIAG INJ SC/IM: CPT | Performed by: EMERGENCY MEDICINE

## 2023-10-10 PROCEDURE — 250N000011 HC RX IP 250 OP 636: Mod: JZ | Performed by: EMERGENCY MEDICINE

## 2023-10-10 RX ORDER — LORAZEPAM 2 MG/ML
1 INJECTION INTRAMUSCULAR ONCE
Status: COMPLETED | OUTPATIENT
Start: 2023-10-10 | End: 2023-10-10

## 2023-10-10 RX ORDER — DIPHENHYDRAMINE HYDROCHLORIDE 50 MG/ML
25 INJECTION INTRAMUSCULAR; INTRAVENOUS ONCE
Status: COMPLETED | OUTPATIENT
Start: 2023-10-10 | End: 2023-10-10

## 2023-10-10 RX ORDER — HALOPERIDOL 5 MG/ML
5 INJECTION INTRAMUSCULAR ONCE
Status: COMPLETED | OUTPATIENT
Start: 2023-10-10 | End: 2023-10-10

## 2023-10-10 RX ORDER — DIPHENHYDRAMINE HYDROCHLORIDE 50 MG/ML
25 INJECTION INTRAMUSCULAR; INTRAVENOUS ONCE
Status: DISCONTINUED | OUTPATIENT
Start: 2023-10-10 | End: 2023-10-10

## 2023-10-10 RX ADMIN — OLANZAPINE 10 MG: 5 TABLET, ORALLY DISINTEGRATING ORAL at 07:52

## 2023-10-10 RX ADMIN — HALOPERIDOL LACTATE 5 MG: 5 INJECTION, SOLUTION INTRAMUSCULAR at 21:05

## 2023-10-10 RX ADMIN — DIPHENHYDRAMINE HYDROCHLORIDE 25 MG: 50 INJECTION, SOLUTION INTRAMUSCULAR; INTRAVENOUS at 21:05

## 2023-10-10 RX ADMIN — LORAZEPAM 1 MG: 2 INJECTION INTRAMUSCULAR; INTRAVENOUS at 21:05

## 2023-10-10 RX ADMIN — OLANZAPINE 10 MG: 5 TABLET, ORALLY DISINTEGRATING ORAL at 17:56

## 2023-10-10 RX ADMIN — POLYETHYLENE GLYCOL 3350 17 G: 17 POWDER, FOR SOLUTION ORAL at 07:54

## 2023-10-10 ASSESSMENT — ACTIVITIES OF DAILY LIVING (ADL)
ADLS_ACUITY_SCORE: 35

## 2023-10-10 NOTE — PROGRESS NOTES
"Triage & Transition Services, Extended Care     Therapy Progress Note    Patient: Moe goes by \"Moe,\" uses he/him pronouns  Date of Service: October 10, 2023  Site of Service:  ED06  Patient was seen in-person.     Presenting problem:   Moe is followed related to Long wait time for admission: 33+ hours . Please see initial DEC/[facility] Crisis Assessment completed by JOVI Iglesias on 10/9/23 for complete assessment information. Notable concerns include: Verbal agitation, Suicidal ideation, Significant behavioral change. Per initial consult \" Father brought pt into the ED after pt has had large change in presntation, with concerns for psychosis and suicidal ideation.\"    Individuals Present: Moe & SENDY Ramachandran    Session start: 11:33am  Session end: 11:43am  Session duration in minutes: 10 minutes   Session number: 1  Anticipated number of sessions or this episode of care: 1-4  CPT utilized: non billable     Current Presentation:   Writer attempted to meet with patient, he continues to present as disorganized with verbal agitation.  Patient frequently exits his room with intrusive behaviors, attempting to go into other rooms, he is difficult to redirect.  Patient sat on the floor at one point, then stood up and started to exit his room again, attempted to enter the room next to him, he then asked about and inhaler, RN spoke to patient, he then started yelling and cursing at the hospital and staff. Writer concluded meeting, patient was placed in seclusion.       Mental Status Exam:   Appearance: awake, alert and disheveled   Attitude: uncooperative  Eye Contact: poor   Mood:  labile  Affect: intensity is exaggerated  Speech: rambling  Psychomotor Behavior:  patient wandering, intrusive   Thought Process:  disorganized  Associations: loosening of associations present  Thought Content: patient appears to be responding to internal stimuli  Insight: limited  Judgement: limited  Oriented to:  unable to " "assess  Attention Span and Concentration: limited  Recent and Remote Memory: limited    Diagnosis:   Schizophrenia F20.9             Therapeutic Intervention(s):   Provided active listening, unconditional positive regard, and validation.     Treatment Objective(s) Addressed:   The focus of this session was on rapport building.     Progress Towards Goals:   Patient reports  no change in  symptoms. Patient is not making progress towards treatment goals as evidenced by continued agitation and disorganized thoughts.     Case Management:   Called patient's  is Josafat Sky at [phone].  Notified patient continues to present as disorganized with verbal agitation, difficulties redirecting the patient, resulting in the need for seclusion.  Asya stated she is working on revoking patient's provisional discharge, will send to the DEC fax once the order is signed.      General Recommendations:   Continue to monitor for harm. Consider: Use a positive, direct and calm approach. Pt's tend to match the energy/mood of the staff. Keep focus positive and upbeat, Use clear and concise directions, too many words can be overwhelming, Provide the pt with options to provide a sense of control. Try to tell the pt what they can do instead of what they can't do, Verbally state expectations , and Be firm but gentle when redirecting    Plan:   Inpatient Mental Health: continue to recommend IP  admission for safety and stabilization.  Patient continues to present with significant disorganization and verbal aggression.  Per initial consult \"Pt is a 22 year old male with a history of schizophrenia who is brought into to ED by father due to decompensation in MH since recent discharge from [facility] last week. Pt is presenting as non-compliant, requesting to return home, showing bizarre behavior, odd responses such as laughing out loud inappropriately, disorganized, verbally abrasive and having active psychosis " "concerns. Per collateral, pt has been making ideation comments and has had increased aggression in the home by breaking items and punching walls. Pt is currently on a commitment with [facility], who was contacted today and supports admission for MH though recommended 72HH hold until paperwork can be finished to support such care. CW is requesting [facility] for continuation of care, though is aware with 72HH pt can be placed aware. Intake was called and is aware of [facility] preference per commitment . Pt displays the following risk factors that support IP admission: Recent discharge from hospital, psychosis concerns and large decompensation in behavior. Pt is unable to engage in safety planning to mitigate risk level in a non-secure setting. Lower levels of care have not been effective in mitigating risk. Due to this IP is the least restrictive option of care for pt. Pt should remain in IP until deemed safe to return to the community and engage in OP MH supports.\"    Plan for Care reviewed with Assigned Medical Provider? Yes. Provider, TRACE Akins, response: understands plan of care      SENDY Ramachandran   Licensed Mental Health Professional (LMHP), [facility]  [phone]     "

## 2023-10-10 NOTE — ED NOTES
RN ED Mental Health Handoff Note    72 hour hold    Does patient require 1:1? No    Hold and rights been given and documented for patient: Yes    Is the patient in BH scrubs? Yes    Has the patient been searched? Yes    Is the 15 minute observation tool up to date? Yes    Was patient issued a welcome folder? No -    Room check completed this shift: Yes    PSS3 and [place] Assessment/Reassessment this shift:    PSS-3      Date and Time Over the past 2 weeks have you felt down, depressed, or hopeless? Over the past 2 weeks have you had thoughts of killing yourself? Have you ever attempted to kill yourself? When did this last happen? User   10/09/23 0108 yes no no -- MM          C-SSRS ([place])      Date and Time Q1 Wished to be Dead (Past Month) Q2 Suicidal Thoughts (Past Month) Q3 Suicidal Thought Method Q4 Suicidal Intent without Specific Plan Q5 Suicide Intent with Specific Plan Q6 Suicide Behavior (Lifetime) Within the Past 3 Months? RETIRED: Level of Risk per Screen Screening Not Complete User   10/09/23 1157 -- -- -- -- -- no -- -- -- NG   10/09/23 1022 yes yes yes no no -- -- -- -- NG            Behavioral status of patient: Green    Code 21 called this shift? No    Use of restraints/seclusion this shift? No    Most recent vital signs:      BP: 125/85 Pulse: 98   Resp: 12 SpO2: 100 % O2 Device: None (Room air)      Medications:  Scheduled medication compliance? Yes    PRN Meds administered this shift? No    Medications   OLANZapine zydis (zyPREXA) ODT tab 10 mg (10 mg Oral $Given 10/9/23 1600)   fluticasone-vilanterol (BREO ELLIPTA) 200-25 MCG/ACT inhaler 1 puff (1 puff Inhalation $Given 10/9/23 3937)   fluticasone (FLONASE) 50 MCG/ACT spray 1 spray (1 spray Both Nostrils $Given 10/9/23 5796)   polyethylene glycol (MIRALAX) Packet 17 g (has no administration in time range)   sennosides (SENOKOT) tablet 1 tablet (has no administration in time range)   acetaminophen (TYLENOL) tablet 1,000 mg (1,000 mg Oral  $Given 10/9/23 2580)         ADLs    Meal Provided this shift? No    Hygiene items provided? Yes    ADLs completed? Yes    Date of last shower: Unknown    Any significant events this shift? No    Any information that would be helpful in caring for this patient?  See previous notes    Family present/updated? No    Location of patient's belongings: DEC office    Critical Care Minutes:  Does the patient need critical care minutes documented? No

## 2023-10-10 NOTE — ED NOTES
Patient continues to be agitated and yelling in room. Patient occasional yelling and it is directed at staff. Other times patient is yelling nonsensical phrases.

## 2023-10-10 NOTE — TELEPHONE ENCOUNTER
8:41 AM: Intake called [facility] and was informed that they are at capacity.    9:04 AM: Intake called DEC to confirm legal status and was transferred to . [place] informed that they are waiting for a revocation for court order.    9:25 AM: Intake paged Dania to present Pt for 4A.    10:22 AM: Writer sent a second page to Rachele, prior to seeing message from Andrea informing that Pt has been accepted to private room on 4A when afternoon discharges are completed.    10:53 AM: Pt placed in queue and added to PPS admit board. Indicia completed.     10:58 AM: Intake called to inform that Pt is in queue. CRN informed that they will call for Nurse-to-Nurse after 12pm.    11:00 AM: [facility] provided with disposition and time for Nurse-to-Nurse ETA for 12pm. Station 4A will call Jennifer.    1:03 PM: Intake received a call from station 4A informing that Pt will need an SIO and will have to wait until evening shift to admit. Intake to call Jennifer.    1:04 PM: Intake provided Jyoti at [facility] with update.

## 2023-10-10 NOTE — ED NOTES
"Patient continues to yell in room.  Patient has wrapped himself in his blanket and his using his toothbrush as a wand and appears to be \"casting a spell\". Patient given PRN Zyprexa in anticipation of ambulance transfer.   "

## 2023-10-10 NOTE — ED NOTES
IP MH Referral Acuity Rating Score (RARS)     LMHP complete at referral to IP MH, with DEC; and, daily while awaiting IP MH placement. Call score to PPS.  CRITERIA SCORING   New 72 HH and Involuntary for IP MH (not adolescent) 1/1   Boarding over 24 hours 1/1   Vulnerable adult at least 55+ with multiple co morbidities; or, Patient age 11 or under 0/1   Suicide ideation without relief of precipitating factors 0/1   Current plan for suicide 0/1   Current plan for homicide 0/1   Imminent risk or actual attempt to seriously harm another without relief of factors precipitating the attempt 1/1   Severe dysfunction in daily living (ex: complete neglect for self care, extreme disruption in vegetative function, extreme deterioration in social interactions) 1/1   Recent (last 2 weeks) or current physical aggression in the ED 1/1   Restraints or seclusion episode in ED 1/1   Verbal aggression, agitation, yelling, etc., while in the ED 1/1   Active psychosis with psychomotor agitation or catatonia 1/1   Need for constant or near constant redirection (from leaving, from others, etc).  1/1   Intrusive or disruptive behaviors 1/1   TOTAL Acuity Total Score: 10

## 2023-10-10 NOTE — TELEPHONE ENCOUNTER
R: [place]  Access Inpatient Bed Call Log 10/10/23 @ 2:30am   Intake has called facilities that have not updated their bed status within the last 12 hours.??      ADULTS:       *METRO:  [place] -- [facility]: @ cap per website.  [place] -- [facility]:  @ cap per website.  [place] -- [facility]: @ cap per website.  [place] -- [facility]: @ cap per website. No high acuity. Negative COVID required.  [place] -- [facility]: @ cap per website.  [facility] -- [facility]: @ cap per website.  [place] -- [facility] - POSTING 1 BED. Ages 18-25, Voluntary only, COVID test req'd, NO aggression, physical or sexual assault, violence hx or schizoaffective d/o  [place] -- [facility]: @ cap per website.  [place] -- [facility]: @ cap per website.  [place] -- [facility]:  @ cap per website.     *STATEWIDE (by distance):  [facility]: POSTING 3 BEDS. Mixed unit/Low acuity only.   [facility]: @ cap per website. Low acuity, No aggression  [facility]: @ cap per website.   [facility]:  POSTING 2 BEDS. Low acuity only. No current aggression.  [facility]:  @ cap per website. COVID negative test req. Lower acuity only  [facility]: POSTING 1 BED. Low acuity only. Prefer med adjustments placement.  [facility]: POSTING 2 BEDS. No aggression  [place] -- [facility]: POSTING 2 BEDS. NO reviews after 10PM. Low acuity only.  [place] -- [facility]: @ cap per website. No hx of aggression. No sexual offenders. Voluntary patients only.  [place] -- [facility]: POSTING 8 BEDS. Low acuity only. Must have the cognitive ability to do programming. No aggressive or violent behavior or recent HX in the last 2 yrs. MH must be primary.   [place] -- [facility]: @ cap per website.  COVID negative test. Must be low acuity ONLY.  [place] -- [facility]: POSTING 1 BED. Low acuity. Negative Covid.   [place] -- [facility]: POSTING 4  BEDS. Covid neg. Voluntary only. Mixed unit of adults & adol. No aggressive or violent behavior. No registered sex offenders.   [place] -- [facility]: @ cap per website. COVID negative test   [facility]: @ cap per website. No hx of aggression/assault. No lines, drains or tubes. Does not provide detox or CD treatment.   [place] -- [facility]: POSTING 5 BEDS. Mixed unit/Low acuity/no medical devices - IV, CPAP etc.      Pt remains on waitlist pending appropriate placement availability
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
## 2023-10-10 NOTE — ED NOTES
Patient becoming increasingly agitated, patient has made several attempts to enter the room of the patient in room 5. Patient tends to exit his room and is difficult to redirect back to room. Patient back in room and placed in seclusion. Patient in room yelling and banging on walls.

## 2023-10-10 NOTE — TELEPHONE ENCOUNTER
6:31 PM Per call  with Dr. Watt after reviewing pt due to their behaviors and recent seclusion. Dr. Watt is recommending patient is more appropriate for unit 12. Pt removed from queue.  6:38 PM Writer spoke with ANS as well.  6:45 PM ED notified.    Intake received via RF on 10/10/2023 at 5:10:45 PM Findings Of Fact Conclusions Of Law And Order Authorizing Use Of Neuroleptic Medications. Filed in Court/Commitment/Bolanos/Holds folder.

## 2023-10-10 NOTE — ED PROVIDER NOTES
Patient signed out to me by Dr. Polk.  Pending placement given concern for acute psychosis.  [facility]  involved.  Remains on 72 hour STERLING. No acute events overnight.     Signed out to Dr. Belle.      Delilah Madison,   10/10/23 0446

## 2023-10-10 NOTE — ED NOTES
RN ED Mental Health Handoff Note    72 hour hold    Does patient require 1:1? No    Hold and rights been given and documented for patient: Yes    Is the patient in BH scrubs? Yes    Has the patient been searched? Yes    Is the 15 minute observation tool up to date? Yes    Was patient issued a welcome folder? Yes    Room check completed this shift: Yes    PSS3 and [place] Assessment/Reassessment this shift:    PSS-3      Date and Time Over the past 2 weeks have you felt down, depressed, or hopeless? Over the past 2 weeks have you had thoughts of killing yourself? Have you ever attempted to kill yourself? When did this last happen? User   10/09/23 0108 yes no no -- MM          C-SSRS ([place])      Date and Time Q1 Wished to be Dead (Past Month) Q2 Suicidal Thoughts (Past Month) Q3 Suicidal Thought Method Q4 Suicidal Intent without Specific Plan Q5 Suicide Intent with Specific Plan Q6 Suicide Behavior (Lifetime) Within the Past 3 Months? RETIRED: Level of Risk per Screen Screening Not Complete User   10/09/23 1157 -- -- -- -- -- no -- -- -- NG   10/09/23 1022 yes yes yes no no -- -- -- -- NG            Behavioral status of patient: Yellow. Patient yelling, and agitated    Code 21 called this shift? No    Use of restraints/seclusion this shift? Yes. Details: Seclusion    Most recent vital signs:      BP: 122/78 Pulse: (!) 18   Resp: 18 SpO2: 99 %        Medications:  Scheduled medication compliance? Yes    PRN Meds administered this shift? Yes    Medications   OLANZapine zydis (zyPREXA) ODT tab 10 mg (10 mg Oral $Given 10/10/23 6250)   fluticasone-vilanterol (BREO ELLIPTA) 200-25 MCG/ACT inhaler 1 puff (1 puff Inhalation Not Given 10/10/23 3494)   fluticasone (FLONASE) 50 MCG/ACT spray 1 spray (1 spray Both Nostrils Not Given 10/10/23 4761)   polyethylene glycol (MIRALAX) Packet 17 g (17 g Oral $Given 10/10/23 0854)   sennosides (SENOKOT) tablet 1 tablet (has no administration in time range)   acetaminophen (TYLENOL)  tablet 1,000 mg (1,000 mg Oral $Given 10/9/23 9207)         ADLs    Meal Provided this shift? Yes    Hygiene items provided? Yes    ADLs completed? Yes    Date of last shower: Unknown    Any significant events this shift? No    Any information that would be helpful in caring for this patient?  None    Family present/updated? Yes    Location of patient's belongings: Dec    Critical Care Minutes:  Does the patient need critical care minutes documented? Yes

## 2023-10-11 ENCOUNTER — TELEPHONE (OUTPATIENT)
Dept: BEHAVIORAL HEALTH | Facility: CLINIC | Age: 23
End: 2023-10-11
Payer: COMMERCIAL

## 2023-10-11 PROCEDURE — 250N000013 HC RX MED GY IP 250 OP 250 PS 637: Performed by: EMERGENCY MEDICINE

## 2023-10-11 RX ORDER — OLANZAPINE 10 MG/2ML
5 INJECTION, POWDER, FOR SOLUTION INTRAMUSCULAR
Status: DISCONTINUED | OUTPATIENT
Start: 2023-10-11 | End: 2023-10-12

## 2023-10-11 RX ADMIN — FLUTICASONE FUROATE AND VILANTEROL TRIFENATATE 1 PUFF: 200; 25 POWDER RESPIRATORY (INHALATION) at 16:06

## 2023-10-11 RX ADMIN — OLANZAPINE 10 MG: 5 TABLET, ORALLY DISINTEGRATING ORAL at 12:29

## 2023-10-11 ASSESSMENT — ACTIVITIES OF DAILY LIVING (ADL)
ADLS_ACUITY_SCORE: 35

## 2023-10-11 NOTE — TELEPHONE ENCOUNTER
R; RF 10/11/2023 1:53:35 PM Intent to Revoke Provisional Discharge. Filed in Court/Commitment/Bolanos/Hold folder.        R: [place]  Access Inpatient Bed Call Log 10/11/23 @ 0815    Intake has called facilities that have not updated the bed status within the last 12 hours. (Statewide)                   [facility] is at capacity.              [facility] is posting 0 beds. [phone].     [facility] is posting 2 beds. Negative covid required.                 [facility] is posting 0 bed. Negative covid required. [phone]    [place] [facility] is posting 2 beds.   [phone]   [facility] is posting 0 beds. [phone].    [facility] is posting 1 bed for Young adults. Call for details. Negative covid.  [phone].   [facility] is posting 0 beds           [facility] is posting 2 beds.     [facility] is posting  5  beds. LOW acuity ONLY. Mixed unit 12+. Negative covid- [phone].     [facility] has 0 beds posted. No aggression. Negative Covid. Low acuity.      [facility] is posting 0 beds. Negative covid. [phone].  Per voicemail @8:05am, at capacity.   [facility] is posting 0 beds. Low acuity only. Negative covid.  [phone].    [facility] is posting 2 beds. Low acuity. No current aggression.  [phone]     [facility] ([place] is posting  3  bed available. Negative covid.  [phone].       [facility] is posting 1 beds. Low acuity. 72 HH hold preferred. Negative covid required. [phone]    [facility] ([place] is posting 0 beds. Low acuity only. Negative covid.  [phone].    [facility] [place] is posting  5  beds.  Negative covid required.   Vol only, No history of aggression, violence, or assault. No sexual offenders. No 72 HH holds. [phone].       [facility] is posting 9 beds. Negative covid required.  (Must  have the cognitive ability to do programming. No aggressive or violent behavior or recent HX in the last 2 yrs.  must be primary.) Always low acuity. [phone]    [facility] has 0 beds posted. Negative covid required.  Low acuity only. Violence and aggression capped.  [phone]. Low acuity only.     [facility] is posting 2 bed. Low acuity, Negative covid required.  [phone].   [facility] is posting 2 beds. Unit is a combined unit (14+). No aggressive patients. Voluntary only. Must be accompanied by a guardian.  Negative covid. [phone].     [facility] posting  3  beds Negative covid required.  [phone]    [facility] is posting 0 beds. Negative covid. LOW acuity. (No lines, drains, or tubes, oxygen, CPAP, IV, etc.). [phone].    [facility] is posting 6 beds. No covid test required.  [phone] No answer per call @7:53am.   [facility] ([facility]) is posting 5 beds. Negative covid. (No. lines, drains, or tubes, oxygen, CPAP, IV, etc.). [phone]      Patient remains on the work list pending appropriate bed availability.

## 2023-10-11 NOTE — TELEPHONE ENCOUNTER
R: [place]  Access Inpatient Bed Call Log  10/11/2023 12:11 AM  Intake has called facilities that have not updated their bed status within the last 12 hours.??      ADULTS:     *METRO  [place] -- [facility]: @ cap per website.  [place] -- [facility]:  @ cap per website.  [place] -- [facility]: Posting 2 beds. 12:28am Per Naye, Low acuity.  [place] -- [facility]: @ cap per website.   [place] -- [facility]: Posting 2 beds. 12:28am Per Naye, Low acuity.  [facility]: @ cap per website.  [place] -- [facility] beds Posting 1 Bed.   [place] -- [facility]: @ cap per website.  [place] -- [facility]: @ cap per website.  [place] -- [facility]:  @ cap per website.  [facility]: @ cap per website. Low acuity only     *[facility]: Posting 6 bed. Mixed unit/Low acuity only.   : @ cap per website. Low acuity, No aggression  [facility]: @ cap per website   [facility]:  Posting 2 beds. Low acuity only. No current aggression.  [facility]:  @cap per website COVID negative test req. Lower acuity only  [facility]: @ Posting 3 beds. Low acuity only. Prefer med adjustments placement.  [facility]: @ cap per website. No aggression  [place] -- [facility]: Posting 2 bed. NO reviews after 10PM. Low acuity only.    [place] -- [facility]: Posting 5 bed. No hx of aggression. No sexual offenders. Voluntary patients only.  [place] -- [facility]:  Posting 9 beds. Low acuity only. Must have the cognitive ability to do programming. No aggressive or violent behavior or recent HX in the last 2 yrs. MH must be primary.   [place] -- [facility]: Posting 3 beds.  COVID negative test. Must be low acuity ONLY.  [place] -- [facility]: Posting 2 bed. Low acuity. Negative Covid. 12:26am Per [place], they are capped.  [place] -- [facility]: Posting 2 bed. Covid neg. Voluntary  only. Mixed unit of adults & adol. No aggressive or violent behavior. No registered sex offenders. 12:55am Per Dannielle they are open for review.    [place] -- [facility]: Posting 3 beds. COVID negative test  [facility]: @[facility] per website. No hx of aggression/assault. No lines, drains or tubes. Does not provide detox or CD treatment.   [place] -- [facility]: Posting 5 bed. Mixed unit/Low acuity/no medical devices - IV, CPAP etc.     Pt remains on waitlist pending appropriate placement availability.

## 2023-10-11 NOTE — ED NOTES
"Patient woke up, came out of the room, attempted to grab a sitter and stated \"If you lock this door I will kick your ass!\". Security and myself we able to de-escalate the patient and he agreed to take some medication to help calm him. Patient apologized and stated \"It is my schizophrenia acting up\".   "

## 2023-10-11 NOTE — ED NOTES
Pt ate dinner, throwing some food items onto the floor that he didn't want. For the past few hours pt has been talking to self, yelling in room, and occasionally kicking door. Pt walked to the bathroom several times but has not attempted to run. Pt requesting shower and new clothes. Explained to pt that we do not have staff to help with shower at this time, but new clothes and toothpaste provided to patient. Pt agreeable to this. Following dinner, pt is laying down under blanket with lights off an TV on.

## 2023-10-11 NOTE — ED PROVIDER NOTES
This patient's care was signed out to me.  The nurse alerted me at 9 PM that the patient was very agitated banging on the door so I reviewed his recent medications which included 10 mg ODT Zyprexa 3 hours ago and I ordered IM injection of Haldol 5 mg, Benadryl 25 mg and Ativan 1 mg.  The patient's care was signed out to Dr. Madison.     Laurel Brewer MD  10/10/23 2026

## 2023-10-11 NOTE — ED PROVIDER NOTES
Patient signed out by Dr. Belle.  History of decompensated schizophrenia.  Awaiting inpatient treatment.  Signed out to overnight team.    7:00 PM: Notified by nursing staff that patient displayed aggressive behaviors towards staff and initially refused to return to his room.  He was ultimately able to return with security.  He was placed on seclusion.  I did note this patient's erratic behavior.  Will continue with seclusion at this time.  He has PRN medication available to him for agitation if needed.  Per nursing staff, these behaviors have been noted during ED stay.    9:13 PM: Patient re-checked.  In bed.  Will lift seclusion.     Nikos Mckenna MD  10/11/23 8694       Nikos Mckenna MD  10/11/23 8076

## 2023-10-11 NOTE — ED NOTES
RN ED Mental Health Handoff Note    72 hour hold    Does patient require 1:1? No    Hold and rights been given and documented for patient: Yes    Is the patient in BH scrubs? Yes    Has the patient been searched? Yes    Is the 15 minute observation tool up to date? Yes    Was patient issued a welcome folder? No -been here over 52 hrs    Room check completed this shift: Yes    PSS3 and [place] Assessment/Reassessment this shift:    PSS-3      Date and Time Over the past 2 weeks have you felt down, depressed, or hopeless? Over the past 2 weeks have you had thoughts of killing yourself? Have you ever attempted to kill yourself? When did this last happen? User   10/09/23 0108 yes no no -- MM          C-SSRS ([place])      Date and Time Q1 Wished to be Dead (Past Month) Q2 Suicidal Thoughts (Past Month) Q3 Suicidal Thought Method Q4 Suicidal Intent without Specific Plan Q5 Suicide Intent with Specific Plan Q6 Suicide Behavior (Lifetime) Within the Past 3 Months? RETIRED: Level of Risk per Screen Screening Not Complete User   10/09/23 1157 -- -- -- -- -- no -- -- -- NG   10/09/23 1022 yes yes yes no no -- -- -- -- NG            Behavioral status of patient: Yellow. Boisterous, agitated, restless, sometimes redirectable    Code 21 called this shift? No    Use of restraints/seclusion this shift? Yes. Details:  was in seclusion yesterday.    Most recent vital signs:      BP: 122/78 Pulse: (!) 18   Resp: 18 SpO2: 99 %        Medications:  Scheduled medication compliance? No- none ordered    PRN Meds administered this shift? Yes    Medications   OLANZapine zydis (zyPREXA) ODT tab 10 mg (10 mg Oral $Given 10/10/23 3438)   fluticasone-vilanterol (BREO ELLIPTA) 200-25 MCG/ACT inhaler 1 puff (1 puff Inhalation Not Given 10/10/23 6234)   fluticasone (FLONASE) 50 MCG/ACT spray 1 spray (1 spray Both Nostrils Not Given 10/10/23 1268)   polyethylene glycol (MIRALAX) Packet 17 g (17 g Oral $Given 10/10/23 9333)   sennosides (SENOKOT)  tablet 1 tablet (has no administration in time range)   acetaminophen (TYLENOL) tablet 1,000 mg (1,000 mg Oral $Given 10/9/23 0146)   haloperidol lactate (HALDOL) injection 5 mg (5 mg Intramuscular $Given 10/10/23 2105)   LORazepam (ATIVAN) injection 1 mg (1 mg Intramuscular $Given 10/10/23 2105)   diphenhydrAMINE (BENADRYL) injection 25 mg (25 mg Intramuscular $Given 10/10/23 2105)         ADLs    Meal Provided this shift? Yes    Hygiene items provided? No    ADLs completed? No    Date of last shower: NA    Any significant events this shift? Yes. Details: pt restless and in and out of room; tried walking down the hallway and pressed up against security. Pt very boisterous.    Any information that would be helpful in caring for this patient?  Need to be direct with pt when attempting to redirect pt.     Pt talking about walking out of here or having father picking him up    Family present/updated? No    Location of patient's belongings: Dec office.    Critical Care Minutes:  Does the patient need critical care minutes documented? Yes

## 2023-10-11 NOTE — PROGRESS NOTES
"     Triage & Transition Services, Extended Care      Therapy Progress Note     Patient: Moe goes by \"Moe,\" uses he/him pronouns  Date of Service: October 11, 2023  Site of Service:  ED06  Patient was seen in-person.      Presenting problem:   Moe is followed related to Long wait time for admission: 63+ hours . Please see initial DEC/[facility] Crisis Assessment completed by JOVI Iglesias on 10/9/23 for complete assessment information. Notable concerns include: Verbal agitation, Suicidal ideation, Significant behavioral change. Per initial consult \" Father brought pt into the ED after pt has had large change in presntation, with concerns for psychosis and suicidal ideation.\"     Individuals Present: Moe & SENDY Ramachandran    Session start: 2:15pm  Session end: 2:22pm  Session duration in minutes: 7 minutes   Session number: 2  Anticipated number of sessions or this episode of care: 1-4  CPT utilized: non billable      Current Presentation:   Writer attempted to meet with patient, he is currently in seclusion.  He was observed to be fidgeting, pacing, and knocking on the door.  Patient continues to present as disorganized with verbal agitation.  Patient frequently attempt to exit his room with intrusive behaviors, though appears to be somewhat more redirectable today.        Called patient's  is Radha Skyta Maribel at [phone]. Acknowledged we received the fax with her submission to the court for the revocation, though did not receive the signed 's order, Asya stated she has submitted to the court but has not received the 's order back yet, will fax once she receives.                  Mental Status Exam:   Appearance: awake, alert and disheveled   Attitude: uncooperative  Eye Contact: variable   Mood:  labile  Affect: intensity is exaggerated  Speech: rambling  Psychomotor Behavior:  patient wandering, intrusive   Thought Process:  disorganized  Associations: loosening " "of associations present  Thought Content: patient appears to be responding to internal stimuli  Insight: limited  Judgement: limited  Oriented to:  unable to assess  Attention Span and Concentration: limited  Recent and Remote Memory: limited     Diagnosis:   Schizophrenia F20.9                                    Therapeutic Intervention(s):   Provided active listening, unconditional positive regard, and validation.      Treatment Objective(s) Addressed:   The focus of this session was on rapport building.      Progress Towards Goals:   Patient reports  no change in  symptoms. Patient is not making progress towards treatment goals as evidenced by continued agitation and disorganized thoughts.      Case Management:   Patient's  is Asya [facility], [phone].  Asya stated she is working on revoking patient's provisional discharge, will send to the DEC fax once the 's Order is signed.       General Recommendations:   Continue to monitor for harm. Consider: Use a positive, direct and calm approach. Pt's tend to match the energy/mood of the staff. Keep focus positive and upbeat, Use clear and concise directions, too many words can be overwhelming, Provide the pt with options to provide a sense of control. Try to tell the pt what they can do instead of what they can't do, Verbally state expectations , and Be firm but gentle when redirecting     Plan:   Inpatient Mental Health: continue to recommend IP  admission for safety and stabilization.  Patient continues to present with significant disorganization and verbal aggression.  Per initial consult \"Pt is a 22 year old male with a history of schizophrenia who is brought into to ED by father due to decompensation in MH since recent [place] [facility] [place]. Pt is presenting as non-compliant, requesting to return home, showing bizarre behavior, odd responses such as laughing out loud inappropriately, disorganized, verbally abrasive and " "having active psychosis concerns. Per collateral, pt has been making ideation comments and has had increased aggression in the home by breaking items and punching walls. Pt is currently on a commitment with [facility], who was contacted today and supports admission for MH though recommended 72HH hold until paperwork can be finished to support such care. CW is [place] [facility] [place], though is aware with 72HH pt can be placed aware. Intake was called and is aware of [facility] preference per commitment . Pt displays the following risk factors that support IP admission: Recent discharge from hospital, psychosis concerns and large decompensation in behavior. Pt is unable to engage in safety planning to mitigate risk level in a non-secure setting. Lower levels of care have not been effective in mitigating risk. Due to this IP is the least restrictive option of care for pt. Pt should remain in IP until deemed safe to return to the community and engage in OP MH supports.\"     Plan for Care reviewed with Assigned Medical Provider? Yes. Provider, Phi MONTEZ RN, response: understands plan of care                 SENDY Ramachandran   Licensed Mental Health Professional (LMHP), [facility]  [phone]        "

## 2023-10-12 ENCOUNTER — TELEPHONE (OUTPATIENT)
Dept: BEHAVIORAL HEALTH | Facility: CLINIC | Age: 23
End: 2023-10-12
Payer: COMMERCIAL

## 2023-10-12 LAB
AMPHETAMINES UR QL SCN: ABNORMAL
BARBITURATES UR QL SCN: ABNORMAL
BENZODIAZ UR QL SCN: ABNORMAL
BZE UR QL SCN: ABNORMAL
CANNABINOIDS UR QL SCN: ABNORMAL
FENTANYL UR QL: ABNORMAL
OPIATES UR QL SCN: ABNORMAL
PCP QUAL URINE (ROCHE): ABNORMAL

## 2023-10-12 PROCEDURE — 96372 THER/PROPH/DIAG INJ SC/IM: CPT | Performed by: EMERGENCY MEDICINE

## 2023-10-12 PROCEDURE — 250N000011 HC RX IP 250 OP 636: Mod: JZ | Performed by: EMERGENCY MEDICINE

## 2023-10-12 PROCEDURE — 80307 DRUG TEST PRSMV CHEM ANLYZR: CPT | Performed by: EMERGENCY MEDICINE

## 2023-10-12 RX ORDER — OLANZAPINE 10 MG/2ML
10 INJECTION, POWDER, FOR SOLUTION INTRAMUSCULAR ONCE
Status: COMPLETED | OUTPATIENT
Start: 2023-10-12 | End: 2023-10-12

## 2023-10-12 RX ORDER — OLANZAPINE 10 MG/2ML
10 INJECTION, POWDER, FOR SOLUTION INTRAMUSCULAR
Status: COMPLETED | OUTPATIENT
Start: 2023-10-12 | End: 2023-10-12

## 2023-10-12 RX ADMIN — FLUTICASONE PROPIONATE 1 SPRAY: 50 SPRAY, METERED NASAL at 11:59

## 2023-10-12 RX ADMIN — FLUTICASONE PROPIONATE 1 SPRAY: 50 SPRAY, METERED NASAL at 19:48

## 2023-10-12 RX ADMIN — FLUTICASONE FUROATE AND VILANTEROL TRIFENATATE 1 PUFF: 200; 25 POWDER RESPIRATORY (INHALATION) at 00:39

## 2023-10-12 RX ADMIN — FLUTICASONE FUROATE AND VILANTEROL TRIFENATATE 1 PUFF: 200; 25 POWDER RESPIRATORY (INHALATION) at 11:59

## 2023-10-12 RX ADMIN — FLUTICASONE PROPIONATE 1 SPRAY: 50 SPRAY, METERED NASAL at 00:38

## 2023-10-12 RX ADMIN — OLANZAPINE 10 MG: 10 INJECTION, POWDER, FOR SOLUTION INTRAMUSCULAR at 00:38

## 2023-10-12 RX ADMIN — OLANZAPINE 10 MG: 10 INJECTION, POWDER, FOR SOLUTION INTRAMUSCULAR at 14:03

## 2023-10-12 RX ADMIN — OLANZAPINE 10 MG: 10 INJECTION, POWDER, FOR SOLUTION INTRAMUSCULAR at 04:50

## 2023-10-12 ASSESSMENT — ACTIVITIES OF DAILY LIVING (ADL)
ADLS_ACUITY_SCORE: 35

## 2023-10-12 NOTE — ED NOTES
Patient asking for IM medication, showed writer his arm. Discussed with Dr. Solis and received orders. 3 Security officers at bedside for administration.

## 2023-10-12 NOTE — ED NOTES
Pt. given IM zyprexa per pt. request several hours ago. Pt. in the meant time was having increasing verbal outbursts, pounding on door more frequently, displaying paranoid behaviors and comments, and per security pt. was making gestures to slit his throat. Pt. Offered food and drink, accepted drink. Provided with clean urinal.

## 2023-10-12 NOTE — ED NOTES
"Pt once again, banging on the door, wants more food, asking for inhaler, stated \"I can't breath without my inhaler\", wants apple juice. This RN reviewed MAR, pt has received inhaler at 0038. Pt return to the bed, watch TV, pleasant, does not appear to have increase work of breathing or distress.   "

## 2023-10-12 NOTE — ED NOTES
RN ED Mental Health Handoff Note    72 hour hold - 72 hour hold until receive court order ([facility]  working on getting revocation of provisional discharge signed by a )    Does patient require 1:1? No    Hold and rights been given and documented for patient: Yes    Is the patient in  scrubs? Yes - has scrub top, with own sweat pant, no scrub pants will fit pt     Has the patient been searched? Yes    Is the 15 minute observation tool up to date? Yes    Was patient issued a welcome folder? Yes    Room check completed this shift: Yes    PSS3 and [place] Assessment/Reassessment this shift:    PSS-3      Date and Time Over the past 2 weeks have you felt down, depressed, or hopeless? Over the past 2 weeks have you had thoughts of killing yourself? Have you ever attempted to kill yourself? When did this last happen? User   10/09/23 0108 yes no no -- MM          C-SSRS ([place])      Date and Time Q1 Wished to be Dead (Past Month) Q2 Suicidal Thoughts (Past Month) Q3 Suicidal Thought Method Q4 Suicidal Intent without Specific Plan Q5 Suicide Intent with Specific Plan Q6 Suicide Behavior (Lifetime) Within the Past 3 Months? RETIRED: Level of Risk per Screen Screening Not Complete User   10/09/23 1157 -- -- -- -- -- no -- -- -- NG   10/09/23 1022 yes yes yes no no -- -- -- -- NG            Behavioral status of patient: Yellow. - pt has been on seclusion, will banging and yelling, and response to internal stimulus. Could be aggressive, require security when going into the room      Code 21 called this shift? No    Use of restraints/seclusion this shift? Yes. Details: seclusion     Most recent vital signs:      BP: 129/74 Pulse: (!) 125   Resp: 20 SpO2: 100 % O2 Device: None (Room air)      Medications:  Scheduled medication compliance? Yes    PRN Meds administered this shift? Yes - zyprexa     Medications   OLANZapine zydis (zyPREXA) ODT tab 10 mg (10 mg Oral $Given 10/11/23 0825)    fluticasone-vilanterol (BREO ELLIPTA) 200-25 MCG/ACT inhaler 1 puff (1 puff Inhalation $Given 10/12/23 0039)   fluticasone (FLONASE) 50 MCG/ACT spray 1 spray (1 spray Both Nostrils $Given 10/12/23 0038)   polyethylene glycol (MIRALAX) Packet 17 g (17 g Oral Not Given 10/11/23 1223)   sennosides (SENOKOT) tablet 1 tablet (has no administration in time range)   acetaminophen (TYLENOL) tablet 1,000 mg (1,000 mg Oral $Given 10/9/23 0146)   haloperidol lactate (HALDOL) injection 5 mg (5 mg Intramuscular $Given 10/10/23 2105)   LORazepam (ATIVAN) injection 1 mg (1 mg Intramuscular $Given 10/10/23 2105)   diphenhydrAMINE (BENADRYL) injection 25 mg (25 mg Intramuscular $Given 10/10/23 2105)   OLANZapine (zyPREXA) injection 10 mg (10 mg Intramuscular $Given 10/12/23 0038)   OLANZapine (zyPREXA) injection 10 mg (10 mg Intramuscular $Given 10/12/23 0450)         ADLs    Meal Provided this shift? Yes - pt will constantly requesting for more food, then will also trash excessive food on the floor     Hygiene items provided? No    ADLs completed? No    Date of last shower: unknown    Any significant events this shift? Yes. Details: aggressive activity, see note for detail     Any information that would be helpful in caring for this patient?  Need security when going into the room.  Need to set boundary when pt ask for things, pt need to understand he can't be manipulative and have everything he need.  Will accept PRN IM better than PO     Family present/updated? No    Location of patient's belongings: DEC office     Critical Care Minutes:  Does the patient need critical care minutes documented? Yes

## 2023-10-12 NOTE — TELEPHONE ENCOUNTER
R:  No beds available within [facility]    Bed search initiated @ 10:30am -   Pt is on a court hold -  Statewide search initiated -      [facility] is posting 0 beds. [phone].????per call at to Susie, they are at cap.    [facility] is posting 2 beds. Negative covid required.????????????????   [facility] is posting 0?bed. Negative covid required. [phone] ??per call at 7:07 am to Marielle, they are closed until Sunday due to  infection control.    [facility] is posting 0 beds???[phone]??   [facility] is posting 0 beds. [phone]. ??   [facility] is posting 1?bed for Young adults. Call for details. Negative covid.? [phone].??per call at 7:08 am to Terrell, they have   a handful  of beds avail. [facility] is posting 0 beds??????????   [facility] ([facility]) is posting 2 beds.?[phone]?   ?   [facility] is posting ?4?beds. LOW acuity ONLY. Mixed unit 12+. Negative?covid- [phone].????   [facility] has 0 beds posted. No aggression. Negative Covid. Low acuity. ????   [facility] is posting 0 beds. Negative covid. [phone].   [facility] ([place] is posting 0 beds. Low acuity only. Negative covid. ?[phone].???   [facility] is posting 0 beds. Low acuity. No current aggression.??[phone]? ??   [facility] ([place]) is posting ?2 ?beds available. Negative covid.? [phone].??????   [facility] is posting 2?beds. Low acuity. 72 HH hold preferred. Negative covid required.?[phone]???   [facility] ([place] is posting 0 beds. Low acuity only. Negative covid. ?[phone]. ??per call at to Laurence, they are closed today.    [facility] in [place] is posting ?5 ?beds.? Negative covid required.?? Vol only, No history of aggression, violence, or assault. No sexual offenders. No 72  holds.?[phone].????per call to Loreta, they have  beds avail after 3 pm. They can review now.  -  Voluntary Pts only and this pt is on commitment  ?   [facility] is posting 9?beds. Negative covid required.? (Must have the cognitive ability to do programming. No aggressive or violent behavior or recent HX in the last 2 yrs. MH must be primary.) Always low acuity. [phone] ?? Pt is easily agitated.  Only calm/Cooperative.   [facility] has 0?beds posted. Negative covid required.? Low acuity only. Violence and aggression capped.? [phone]. Low acuity only. ???   [facility] is posting 2?beds. Low acuity, Negative covid required.? [phone].??per call to Ilda, they are at cap but they can review patients.    [facility] is posting 2 beds. Unit is a combined unit (14+). No aggressive patients. Voluntary only. Must be accompanied by a guardian.? Negative covid.?[phone].????per call am to Anamaria, they have 1 bed avail.  Pt is easily agitated.  Only calm/Cooperative.   [facility] [place] posting ?0 ?beds Negative covid required.? [phone]???   [facility] [place] is posting 1 bed. Negative covid. LOW acuity. (No lines, drains, or tubes, oxygen, CPAP, IV, etc.). [phone]. ?-  Pt is easily agitated.  Only calm/Cooperative.    [facility] is posting 6?beds. No covid test required.? [phone] per call to Susan, they are at cap. -  Pt is on a commitment -  cannot cross state lines   [facility] ([facility]) is posting 5 beds. Negative covid. (No. lines, drains, or tubes, oxygen, CPAP, IV, etc.).?[phone] ; per call  to Alize, they have  potentially  4 low acuity and no females for high acuity, no adol's for high acuity, adult male high acuity case by case.  Pt is on a commitment -  cannot cross state lines    ?   Patient remains on the work list pending appropriate bed availability.??????

## 2023-10-12 NOTE — ED NOTES
"Pt again yelled \"help\" in the room and banging on the door. Then urinated in the urinal. This RN went into the room, assessed pt when pt has returned to bed. Tossed pants towards the door. Pt does not appear in distress, ask for food, and inhaler and cell phone. Reminded pt once again, no food until morning, and will not be given cell phone due to current aggressive behavior. Pt accepted. Ice water provided, urinal changed   "

## 2023-10-12 NOTE — TELEPHONE ENCOUNTER
R: [place]  Access Inpatient Bed Call Log 10/12/23 @ 4:30 pm???   Intake has called facilities that have not updated the bed status within the last 12 hours.?????????????????     ?                           [facility] is at capacity.?????????????                           [facility] is posting 0 beds. [phone].????per call at 7:05 am to Susie, they are at cap.                            [facility] is posting 1 beds. Negative covid required.????????????????                           [facility] is posting 0?bed. Negative covid required. [phone] ??per call at 7:07 am to Marielle, they are closed until Sunday due to  infection control.                            [facility] is posting 2 beds.???[phone]?? Per call to [facility] thus evening 0 beds available.                          [facility] is posting 0 beds. [phone]. ??                           [facility] is posting 2?beds for Young adults. Call for details. Negative covid.? [phone].??per call at 7:08 am to [facility], they have   a handful  of beds avail.  Pt not appropriate.                          [facility] is posting 0 beds??????????                           [facility] ([facility]) is posting 2 beds.?[phone]? Per call to [facility] thus evening 0 beds available.      ?                           [facility] is posting ?6?beds. LOW acuity ONLY. Mixed unit 12+. Negative?covid- [phone].????  Pt not appropriate.                          [facility] has 0 beds posted. No aggression. Negative Covid. Low acuity. ????                           [facility] is posting 0 beds. Negative covid. [phone]. ?Per call at 7:12 am,  said NOT to leave a message; no option to speak with a live rep.                            [facility] ([place]) is posting 1 bed. Low acuity only. Negative covid. ?[phone].???                           [facility] is posting 0 beds. Low acuity. No  current aggression.??[phone]? ??                           [facility] ([place]) posted 0 beds available. Negative covid.? [phone].??????                           [facility] is posting 0?beds. Low acuity. 72 HH hold preferred. Negative covid required.?[phone]???  Pt not appropriate.                          [facility] ([place]) is posting 2 beds. Low acuity only. Negative covid. ?[phone]. ?                           [facility] in [place] is posting 5?beds.? Negative covid required.?? Vol only, No history of aggression, violence, or assault. No sexual offenders. No 72 HH holds.?[phone].????per call at 7:18 am to Loreta, they have beds avail after 3 pm. They can review now.   Pt not appropriate.    ?                           [facility] is posting 9?beds. Negative covid required.? (Must have the cognitive ability to do programming. No aggressive or violent behavior or recent HX in the last 2 yrs.  must be primary.) Always low acuity. [phone] ??  Pt not appropriate.                          [facility] has 0?beds posted. Negative covid required.? Low acuity only. Violence and aggression capped.? [phone]. Low acuity only. ???                           [facility] is posting 0?beds. Low acuity, Negative covid required.? [phone].??                           [facility] is posting 2 beds. Unit is a combined unit (14+). No aggressive patients. Voluntary only. Must be accompanied by a guardian.? Negative covid.?[phone].????per call at 7:22 am to Anamaria, they have 1 bed avail.   Pt not appropriate.                          [facility] is posting 0 beds Negative covid required.? [phone]???                           [facility] is posting 2 beds. Negative covid. LOW acuity. (No lines, drains, or tubes, oxygen, CPAP, IV, etc.). [phone].  ??                           [facility] is posting 6?beds. No covid test required.? [phone] per call at 7:24 am to Susan, they are at cap.                            [facility] ([facility]) is posting 5 beds. Negative covid. (No. lines, drains, or tubes, oxygen, CPAP, IV, etc.).?[phone] ; per call at 7:27 am to Alize, they have  potentially  4 low acuity and no females for high acuity, no adol s for high acuity, adult male high acuity case by case.   Pt not appropriate.    ?     Patient remains on the work list pending appropriate bed availability.??????     ?

## 2023-10-12 NOTE — ED NOTES
"Pt care assume. During receiving report, pt is noted pacing in the room, turn TV sound loud, talking to self in the room, occasionally has hostile tone, then pt came to the door, repeatly requests for more food, \"soda\"\"rice cracker\"\"orange juice\", was banging on the door, calling \"Rodrigo\". Per previous nurse, pt has received ice water, multiple food item, pt has been trash food at the corner of the room and keep asking for more food. Pt has been set limits and will not receive food until breakfast. Pt was pacing and then returns to bed.   "

## 2023-10-12 NOTE — ED NOTES
Pt's sister came for a short visit and brought him food. Pt's family wants to make sure he knows they didn't abandon him. Pt was appropriate with his sister but she states pt was talking about not wanting to be here and wanting to pat the hospital.

## 2023-10-12 NOTE — PROGRESS NOTES
"Triage & Transition Services, Extended Care     Therapy Progress Note    Patient: Moe goes by \"Moe,\" uses he/him pronouns  Date of Service: October 12, 2023  Site of Service: [facility] ED06    Patient was not seen for direct interview today-- Attempted to see pt in-person. Pt was in seclusion, then pt was sleeping, then pt was again in seclusion due to disruptive behaviors and declining prn meds. Observed pt in hallway interacting with security and RN. Discussed POC with RNs.     Presenting problem:   Moe is followed related to Long wait time for admission: 3 days . Please see initial DEC/[facility] Crisis Assessment for complete assessment information. Notable concerns include florid psychosis with disorientation and agitation.     Diagnosis:   F20.9 Schizophrenia    Case Management:   Received Intent to Revoke, 's report to court requesting revocation, and Order signed by [facility] judge revoking provisional discharge. EC coordinator sent documents to [facility] and HIM. Copy of documents placed on paper chart in [facility].     General Recommendations:   Continue to monitor for harm. Consider: Consider 1:1 staffing, Complete environmental rounding at least 1x/ shift: check for and remove objects which could be use for self/other directed violence, Allow family calls/visits, and Use \"First.. Then...\" language    Plan:   Inpatient Mental Health: Continue to recommend IP  admission for patient safety and stabilization due to acute florid psychosis with agitation, A/V hallucinations, and disorientation rendering pt unable to safely care for self in the community. Patient on MICD commitment through [facility] with provisional discharge currently revoked.     Plan for Care reviewed with Assigned Medical Provider? Yes: Beulah DANIEL RN verbalizes understanding with POC     NESSA NINA M.Ed., [facility], [facility]  Licensed Mental Health Professional  Triage and Transition Services - Extended Care [phone]          "

## 2023-10-12 NOTE — TELEPHONE ENCOUNTER
R: [place]  Access Inpatient Bed Call Log 10/12/23 @ 2:30am   Intake has called facilities that have not updated their bed status within the last 12 hours.??      *METRO:  [place] -- [facility]: @ cap per website.  [place] -- [facility]:  @ cap per website.  [place] -- [facility]: POSTING 2 BEDS.  [place] -- [facility]: @ cap per website. No high acuity. Negative COVID required.  [place] -- [facility]: POSTING 2 BEDS.  [facility] -- [facility]: @ cap per website.  [place] -- [facility]/ beds - POSTING 1 BED. Ages 18-25, Voluntary only, COVID test req'd, NO aggression, physical or sexual assault, violence hx or schizoaffective d/o  [place] -- [facility]: @ cap per website.  [place] -- [facility]: @ cap per website.  [place] -- [facility]:  POSTING 2 BEDS.     *STATEWIDE (by distance):  [facility]: POSTING 4 BEDS. Mixed unit/Low acuity only.   [facility]: @ cap per website. Low acuity, No aggression  [facility]: @ cap per website.   [facility]:  POSTING 2 BEDS. Low acuity only. No current aggression.  [facility]:  @ cap per website. COVID negative test req. Lower acuity only  [facility]: POSTING 3 BEDS. Low acuity only. Prefer med adjustments placement.  [facility]: @ cap per website.. No aggression  [place] -- [facility]: POSTING 2 BEDS. NO reviews after 10PM. Low acuity only.  [place] -- [facility]: POSTING 5 BEDS. No hx of aggression. No sexual offenders. Voluntary patients only.  [place] -- [facility]: POSTING 9 BEDS. Low acuity only. Must have the cognitive ability to do programming. No aggressive or violent behavior or recent HX in the last 2 yrs. MH must be primary.   [place] -- [facility]: @ cap per website.  COVID negative test. Must be low acuity ONLY.  [place] -- [facility]: POSTING 2 BEDS. Low acuity. Negative Covid.   [place] -- [facility]: POSTING 2 BEDS. Covid neg.  Voluntary only. Mixed unit of adults & adol. No aggressive or violent behavior. No registered sex offenders.   [place] -- [facility]: POSTING 2 BEDS. COVID negative test   [facility]: POSTING 1 BED. No hx of aggression/assault. No lines, drains or tubes. Does not provide detox or CD treatment.   [place] -- [facility]: POSTING 5 BEDS. Mixed unit/Low acuity/no medical devices - IV, CPAP etc.      Pt remains on waitlist pending appropriate placement availability

## 2023-10-12 NOTE — ED NOTES
2320 SECLUSION DISCONTINUED  Patient has been laying in bed for about 20 minutes, seclusion removed.

## 2023-10-12 NOTE — ED NOTES
RN ED Mental Health Handoff Note    72 hour hold    Does patient require 1:1? Yes    Hold and rights been given and documented for patient: Yes    Is the patient in BH scrubs? Yes    Has the patient been searched? Yes    Is the 15 minute observation tool up to date? Yes    Was patient issued a welcome folder? Yes    Room check completed this shift: Yes    PSS3 and [place] Assessment/Reassessment this shift:    PSS-3      Date and Time Over the past 2 weeks have you felt down, depressed, or hopeless? Over the past 2 weeks have you had thoughts of killing yourself? Have you ever attempted to kill yourself? When did this last happen? User   10/09/23 0108 yes no no -- MM          C-SSRS ([place])      Date and Time Q1 Wished to be Dead (Past Month) Q2 Suicidal Thoughts (Past Month) Q3 Suicidal Thought Method Q4 Suicidal Intent without Specific Plan Q5 Suicide Intent with Specific Plan Q6 Suicide Behavior (Lifetime) Within the Past 3 Months? RETIRED: Level of Risk per Screen Screening Not Complete User   10/09/23 1157 -- -- -- -- -- no -- -- -- NG   10/09/23 1022 yes yes yes no no -- -- -- -- NG            Behavioral status of patient: Yellow. Easily volatile.     Code 21 called this shift? No    Use of restraints/seclusion this shift? Yes. Patient in seclusion, pacing and lunging at the door, yelling and hollering at hallucinations and occasionally staff.      Most recent vital signs:      BP: 129/74 Pulse: (!) 125   Resp: 20 SpO2: 100 % O2 Device: None (Room air)      Medications:  Scheduled medication compliance? Yes    PRN Meds administered this shift? No    Medications   OLANZapine zydis (zyPREXA) ODT tab 10 mg (10 mg Oral $Given 10/11/23 1229)   fluticasone-vilanterol (BREO ELLIPTA) 200-25 MCG/ACT inhaler 1 puff (1 puff Inhalation $Given 10/12/23 1159)   fluticasone (FLONASE) 50 MCG/ACT spray 1 spray (1 spray Both Nostrils $Given 10/12/23 1159)   polyethylene glycol (MIRALAX) Packet 17 g (17 g Oral Not Given  10/11/23 1223)   sennosides (SENOKOT) tablet 1 tablet (has no administration in time range)   acetaminophen (TYLENOL) tablet 1,000 mg (1,000 mg Oral $Given 10/9/23 0146)   haloperidol lactate (HALDOL) injection 5 mg (5 mg Intramuscular $Given 10/10/23 2105)   LORazepam (ATIVAN) injection 1 mg (1 mg Intramuscular $Given 10/10/23 2105)   diphenhydrAMINE (BENADRYL) injection 25 mg (25 mg Intramuscular $Given 10/10/23 2105)   OLANZapine (zyPREXA) injection 10 mg (10 mg Intramuscular $Given 10/12/23 0038)   OLANZapine (zyPREXA) injection 10 mg (10 mg Intramuscular $Given 10/12/23 0450)         ADLs    Meal Provided this shift? Yes    Hygiene items provided? Yes    ADLs completed? Yes    Date of last shower: Unknown    Any significant events this shift? Floors in room mopped as patient has a tendency to throw food and drinks on the floor.     Any information that would be helpful in caring for this patient?  N/A    Family present/updated? No    Location of patient's belongings: DEC office    Critical Care Minutes:  Does the patient need critical care minutes documented? No

## 2023-10-12 NOTE — ED NOTES
"While in seclusion patient is constantly asking for phone and something to eat. Provided with water. Pt making hand gestures at the camera and inappropriately laughing. Stated, \"Mother fuckers, I'm going murder you. \"  "

## 2023-10-12 NOTE — TELEPHONE ENCOUNTER
R: [place]  Access Inpatient Bed Call Log 10/11/23 @ 3:08 PM   Intake has called facilities that have not updated the bed status within the last 12 hours. (Statewide)                  [facility] is at capacity.              [facility] is posting 0 beds. [phone].     [facility] is posting 2 beds. Negative covid required. 10/11 Per call to John currently reviewing.                [facility] is posting 0 bed. Negative covid required. [phone]    [place] is posting 2 beds.   [phone] 10/11 Per call to John currently reviewing.   [facility] is posting 0 beds. [phone].    [facility] is posting 1 bed for Young adults. Call for details. Negative covid.  [phone].  10/11 Pt not appropriate d/t facility restrictions.  [facility] is posting 0 beds           [facility] is posting 2 beds.      [facility] is posting  5  beds. LOW acuity ONLY. Mixed unit 12+. Negative covid- [phone]. 10/11 Pt not appropriate d/t acuity.  [facility] has 0 beds posted. No aggression. Negative Covid. Low acuity.      [facility] is posting 0 beds. Negative covid. [phone].  Per voicemail @8:05am, at capacity.   [facility] is posting 0 beds. Low acuity only. Negative covid.  [phone].  10/11 Per call @ capacity.  [facility] is posting 2 beds. Low acuity. No current aggression.  [phone]   10/11 Pt not appropriate d/t current aggression.  [facility] is posting  3  bed available. Negative covid.  [phone]. 10/11 Per call @ capacity.      [facility] is posting 1 beds. Low acuity. 72 HH hold preferred. Negative covid required. [phone]    [facility] is posting 0 beds. Low acuity only. Negative covid.  [phone].  10/11 Per call @ capacity.  [facility] [place] is posting  5  beds.  Negative covid required.   Vol only, No  history of aggression, violence, or assault. No sexual offenders. No 72 HH holds. [phone]. 10/11 Pt not appropriate d/t 72 HH.        [facility] is posting 9 beds. Negative covid required.  (Must have the cognitive ability to do programming. No aggressive or violent behavior or recent HX in the last 2 yrs. MH must be primary.) Always low acuity. [phone]  10/11 Pt not appropriate d/t acuity.  [facility] has 0 beds posted. Negative covid required.  Low acuity only. Violence and aggression capped.  [phone]. Low acuity only.     [facility] is posting 2 bed. Low acuity, Negative covid required.  [phone]. 10/11 Pt not appropriate d/t acuity.  [facility] is posting 2 beds. Unit is a combined unit (14+). No aggressive patients. Voluntary only. Must be accompanied by a guardian.  Negative covid. [phone]/11 Pt not appropriate d/t 72 HH.   [facility] posting  3  beds Negative covid required.  [phone]. 10/11 Pt not appropriate d/t acuity.  [facility] [place] is posting 0 beds. Negative covid. LOW acuity. (No lines, drains, or tubes, oxygen, CPAP, IV, etc.). [phone].    [facility] is posting 6 beds. No covid test required.  [phone] No answer per call @7:53am.10/11 Pt not appropriate d/t 72 HH.   [facility] is posting 5 beds. Negative covid. (No. lines, drains, or tubes, oxygen, CPAP, IV, etc.). [phone]. 10/11 Per callno high acuity beds available.      Pt remains on the work list pending appropriate bed availability.

## 2023-10-12 NOTE — ED NOTES
RN ED Mental Health Handoff Note    72 hour hold    Does patient require 1:1? NO    Hold and rights been given and documented for patient: Yes    Is the patient in BH scrubs? Yes    Has the patient been searched? Yes    Is the 15 minute observation tool up to date? Yes    Was patient issued a welcome folder? Yes    Room check completed this shift: Yes    PSS3 and [place] Assessment/Reassessment this shift:    PSS-3      Date and Time Over the past 2 weeks have you felt down, depressed, or hopeless? Over the past 2 weeks have you had thoughts of killing yourself? Have you ever attempted to kill yourself? When did this last happen? User   10/09/23 0108 yes no no -- MM          C-SSRS ([place])      Date and Time Q1 Wished to be Dead (Past Month) Q2 Suicidal Thoughts (Past Month) Q3 Suicidal Thought Method Q4 Suicidal Intent without Specific Plan Q5 Suicide Intent with Specific Plan Q6 Suicide Behavior (Lifetime) Within the Past 3 Months? RETIRED: Level of Risk per Screen Screening Not Complete User   10/09/23 1157 -- -- -- -- -- no -- -- -- NG   10/09/23 1022 yes yes yes no no -- -- -- -- NG            Behavioral status of patient: Yellow. Patient is pacing, constantly at the door    Code 21 called this shift? No    Use of restraints/seclusion this shift? Yes. Details: patient in seclusion due to unwanted and threatening behavior    Most recent vital signs: vitals done this shift      BP: 129/74 Pulse: (!) 125   Resp: 20 SpO2: 100 % O2 Device: None (Room air)      Medications:  Scheduled medication compliance? No    PRN Meds administered this shift? No    Medications   OLANZapine zydis (zyPREXA) ODT tab 10 mg (10 mg Oral $Given 10/11/23 1229)   fluticasone-vilanterol (BREO ELLIPTA) 200-25 MCG/ACT inhaler 1 puff (1 puff Inhalation $Given 10/11/23 1606)   fluticasone (FLONASE) 50 MCG/ACT spray 1 spray (1 spray Both Nostrils Not Given 10/11/23 2039)   polyethylene glycol (MIRALAX) Packet 17 g (17 g Oral Not Given  10/11/23 1223)   sennosides (SENOKOT) tablet 1 tablet (has no administration in time range)   OLANZapine (zyPREXA) injection 5 mg (has no administration in time range)   acetaminophen (TYLENOL) tablet 1,000 mg (1,000 mg Oral $Given 10/9/23 0146)   haloperidol lactate (HALDOL) injection 5 mg (5 mg Intramuscular $Given 10/10/23 2105)   LORazepam (ATIVAN) injection 1 mg (1 mg Intramuscular $Given 10/10/23 2105)   diphenhydrAMINE (BENADRYL) injection 25 mg (25 mg Intramuscular $Given 10/10/23 2105)         ADLs    Meal Provided this shift? Yes    Hygiene items provided? Yes    ADLs completed? No    Date of last shower: UNKNOWN    Any significant events this shift? Yes. Details: SEE NOTES, PT IS BEHAVIORAL    Any information that would be helpful in caring for this patient?  Needs boundaries and to understand that he can't have everything he needs.    Family present/updated? No    Location of patient's belongings: DEC OFFICE    Critical Care Minutes:  Does the patient need critical care minutes documented? Yes

## 2023-10-12 NOTE — ED NOTES
5244 pt up the bathroom, spent a lot of time in the bathroom. Pt removed pants and threw them. Then proceeded to throw wet paper towel at staff/maybe the garbage can. Pt encouraged to go back to room. On the way back to room, patient stole staff's drink off the desk. Removed from patient. Pt encouraged into room and placed into seclusion.     Patient had just been to bathroom but then appeared to use urinal and drank his urine.

## 2023-10-12 NOTE — ED NOTES
"Pt exited room and refused to go back to room after using the bathroom. Security told pt multiple times he needed to wait in his room, and he then he asked to leave room door open. When he was told no he got in security's face and accused him of being racist and said he was \"going to beat his ass\". Pt was escorted into room and placed in seclusion. MD notified and seclusion order obtained at 1900. PRN meds ordered for agitation as well.   "

## 2023-10-12 NOTE — ED NOTES
Patient allowed to use bathroom as he stated he had to poop. Pt provided with 2 lunch boxes and one apple juice. Encouraged patient to clean up his room prior to providing food as it is covered in trash and the floor is full of juice and sticky.

## 2023-10-12 NOTE — ED NOTES
"Pt has increase aggression, hitting hard on the door, yelling and waking up other patient. MD consulted. Pt is offered PO or IM Zyprexa. Pt stated he will be taking IM zyprexa willingly. Security stand by for assistance. Pt stated \"why you all so rude to me\" \"This is all because of racism\". Pt received injection and laying in bed watching TV   "

## 2023-10-12 NOTE — TELEPHONE ENCOUNTER
"11:38 PM Intake received message from EC \"APRIL, adult Jennifer, court hold received, see [employer] tab -- commitment and BOLANOS/\"    RF 10/12/2023 1:21:35 PM Order for apprehension and return to hospital/treatment facility/treatment program. Filed in Court/Commitment/Bolanos/Hold folder.  "

## 2023-10-12 NOTE — ED NOTES
Father called wanting to obtain why wasn't transferred to [place], explained to father that I was a new caregiver and did not understand why patient was not transferred to [place]. Father plans to call back tomorrow for update.

## 2023-10-12 NOTE — ED NOTES
Pt knocked on the door after finished using urinal and return bed to sleep. Urinal changed for pt

## 2023-10-12 NOTE — ED NOTES
Patient is constantly at door with requests. Pt attempted to leave through door while writer standing there. Wants to go. Wants his dad. Pt threw his lunch box meal.   Dr. Mckenna notified of patient's return to seclusion.

## 2023-10-13 ENCOUNTER — HOSPITAL ENCOUNTER (INPATIENT)
Facility: CLINIC | Age: 23
LOS: 25 days | Discharge: PSYCHIATRIC OR SUBSTANCE USE FACILITY | End: 2023-11-07
Attending: PSYCHIATRY & NEUROLOGY | Admitting: PSYCHIATRY & NEUROLOGY
Payer: COMMERCIAL

## 2023-10-13 ENCOUNTER — TELEPHONE (OUTPATIENT)
Dept: BEHAVIORAL HEALTH | Facility: CLINIC | Age: 23
End: 2023-10-13
Payer: COMMERCIAL

## 2023-10-13 VITALS
SYSTOLIC BLOOD PRESSURE: 120 MMHG | HEART RATE: 98 BPM | DIASTOLIC BLOOD PRESSURE: 78 MMHG | OXYGEN SATURATION: 98 % | RESPIRATION RATE: 20 BRPM | TEMPERATURE: 98.5 F

## 2023-10-13 DIAGNOSIS — F41.9 ANXIETY: ICD-10-CM

## 2023-10-13 DIAGNOSIS — G47.00 INSOMNIA, UNSPECIFIED TYPE: ICD-10-CM

## 2023-10-13 DIAGNOSIS — F25.9 SCHIZOPHRENIA, SCHIZOAFFECTIVE, CHRONIC WITH ACUTE EXACERBATION (H): Primary | ICD-10-CM

## 2023-10-13 DIAGNOSIS — E56.9 VITAMIN DEFICIENCY: ICD-10-CM

## 2023-10-13 DIAGNOSIS — K59.09 OTHER CONSTIPATION: ICD-10-CM

## 2023-10-13 DIAGNOSIS — J45.20 MILD INTERMITTENT ASTHMA WITHOUT COMPLICATION: ICD-10-CM

## 2023-10-13 PROBLEM — F23 ACUTE PSYCHOSIS (H): Status: ACTIVE | Noted: 2023-10-13

## 2023-10-13 PROCEDURE — 96372 THER/PROPH/DIAG INJ SC/IM: CPT | Performed by: EMERGENCY MEDICINE

## 2023-10-13 PROCEDURE — 124N000002 HC R&B MH UMMC

## 2023-10-13 PROCEDURE — 250N000013 HC RX MED GY IP 250 OP 250 PS 637: Performed by: EMERGENCY MEDICINE

## 2023-10-13 PROCEDURE — 250N000011 HC RX IP 250 OP 636: Mod: JZ | Performed by: EMERGENCY MEDICINE

## 2023-10-13 PROCEDURE — 250N000013 HC RX MED GY IP 250 OP 250 PS 637: Performed by: PSYCHIATRY & NEUROLOGY

## 2023-10-13 RX ORDER — OLANZAPINE 10 MG/2ML
INJECTION, POWDER, FOR SOLUTION INTRAMUSCULAR
Status: DISCONTINUED
Start: 2023-10-13 | End: 2023-10-13 | Stop reason: WASHOUT

## 2023-10-13 RX ORDER — TRAZODONE HYDROCHLORIDE 50 MG/1
50 TABLET, FILM COATED ORAL
Status: DISCONTINUED | OUTPATIENT
Start: 2023-10-13 | End: 2023-11-07 | Stop reason: HOSPADM

## 2023-10-13 RX ORDER — OLANZAPINE 10 MG/2ML
10 INJECTION, POWDER, FOR SOLUTION INTRAMUSCULAR 3 TIMES DAILY PRN
Status: DISCONTINUED | OUTPATIENT
Start: 2023-10-13 | End: 2023-11-07 | Stop reason: HOSPADM

## 2023-10-13 RX ORDER — OLANZAPINE 10 MG/1
10 TABLET ORAL 3 TIMES DAILY PRN
Status: DISCONTINUED | OUTPATIENT
Start: 2023-10-13 | End: 2023-11-07 | Stop reason: HOSPADM

## 2023-10-13 RX ORDER — LORAZEPAM 1 MG/1
2 TABLET ORAL EVERY 8 HOURS PRN
Status: DISCONTINUED | OUTPATIENT
Start: 2023-10-13 | End: 2023-11-07 | Stop reason: HOSPADM

## 2023-10-13 RX ORDER — ACETAMINOPHEN 325 MG/1
650 TABLET ORAL EVERY 4 HOURS PRN
Status: DISCONTINUED | OUTPATIENT
Start: 2023-10-13 | End: 2023-11-07 | Stop reason: HOSPADM

## 2023-10-13 RX ORDER — FLUTICASONE FUROATE AND VILANTEROL 200; 25 UG/1; UG/1
1 POWDER RESPIRATORY (INHALATION) DAILY
Status: DISCONTINUED | OUTPATIENT
Start: 2023-10-14 | End: 2023-11-07 | Stop reason: HOSPADM

## 2023-10-13 RX ORDER — LORAZEPAM 2 MG/ML
2 INJECTION INTRAMUSCULAR EVERY 8 HOURS PRN
Status: DISCONTINUED | OUTPATIENT
Start: 2023-10-13 | End: 2023-11-07 | Stop reason: HOSPADM

## 2023-10-13 RX ORDER — POLYETHYLENE GLYCOL 3350 17 G/17G
17 POWDER, FOR SOLUTION ORAL DAILY
Status: DISCONTINUED | OUTPATIENT
Start: 2023-10-13 | End: 2023-10-23

## 2023-10-13 RX ORDER — HYDROXYZINE HYDROCHLORIDE 25 MG/1
25 TABLET, FILM COATED ORAL EVERY 4 HOURS PRN
Status: DISCONTINUED | OUTPATIENT
Start: 2023-10-13 | End: 2023-11-07 | Stop reason: HOSPADM

## 2023-10-13 RX ORDER — HALOPERIDOL 5 MG/ML
5 INJECTION INTRAMUSCULAR EVERY 8 HOURS PRN
Status: DISCONTINUED | OUTPATIENT
Start: 2023-10-13 | End: 2023-11-07 | Stop reason: HOSPADM

## 2023-10-13 RX ORDER — OLANZAPINE 10 MG/2ML
5 INJECTION, POWDER, FOR SOLUTION INTRAMUSCULAR 2 TIMES DAILY
Status: DISCONTINUED | OUTPATIENT
Start: 2023-10-13 | End: 2023-10-16

## 2023-10-13 RX ORDER — RISPERIDONE 1 MG/1
1 TABLET ORAL 2 TIMES DAILY
Status: DISCONTINUED | OUTPATIENT
Start: 2023-10-13 | End: 2023-10-16

## 2023-10-13 RX ORDER — FLUTICASONE PROPIONATE 50 MCG
1 SPRAY, SUSPENSION (ML) NASAL 2 TIMES DAILY
Status: DISCONTINUED | OUTPATIENT
Start: 2023-10-13 | End: 2023-11-07 | Stop reason: HOSPADM

## 2023-10-13 RX ORDER — DIPHENHYDRAMINE HYDROCHLORIDE 50 MG/ML
50 INJECTION INTRAMUSCULAR; INTRAVENOUS EVERY 8 HOURS PRN
Status: DISCONTINUED | OUTPATIENT
Start: 2023-10-13 | End: 2023-11-07 | Stop reason: HOSPADM

## 2023-10-13 RX ORDER — BUDESONIDE AND FORMOTEROL FUMARATE DIHYDRATE 160; 4.5 UG/1; UG/1
2 AEROSOL RESPIRATORY (INHALATION)
Status: DISCONTINUED | OUTPATIENT
Start: 2023-10-13 | End: 2023-10-13

## 2023-10-13 RX ORDER — OLANZAPINE 10 MG/2ML
10 INJECTION, POWDER, FOR SOLUTION INTRAMUSCULAR DAILY PRN
Status: DISCONTINUED | OUTPATIENT
Start: 2023-10-13 | End: 2023-10-13 | Stop reason: HOSPADM

## 2023-10-13 RX ORDER — SENNOSIDES 8.6 MG
1 TABLET ORAL 2 TIMES DAILY PRN
Status: DISCONTINUED | OUTPATIENT
Start: 2023-10-13 | End: 2023-11-07 | Stop reason: HOSPADM

## 2023-10-13 RX ORDER — HALOPERIDOL 5 MG/1
5 TABLET ORAL EVERY 8 HOURS PRN
Status: DISCONTINUED | OUTPATIENT
Start: 2023-10-13 | End: 2023-11-07 | Stop reason: HOSPADM

## 2023-10-13 RX ORDER — DIPHENHYDRAMINE HCL 50 MG
50 CAPSULE ORAL EVERY 8 HOURS PRN
Status: DISCONTINUED | OUTPATIENT
Start: 2023-10-13 | End: 2023-11-07 | Stop reason: HOSPADM

## 2023-10-13 RX ORDER — MAGNESIUM HYDROXIDE/ALUMINUM HYDROXICE/SIMETHICONE 120; 1200; 1200 MG/30ML; MG/30ML; MG/30ML
30 SUSPENSION ORAL EVERY 4 HOURS PRN
Status: DISCONTINUED | OUTPATIENT
Start: 2023-10-13 | End: 2023-11-07 | Stop reason: HOSPADM

## 2023-10-13 RX ADMIN — POLYETHYLENE GLYCOL 3350 17 G: 17 POWDER, FOR SOLUTION ORAL at 08:57

## 2023-10-13 RX ADMIN — FLUTICASONE PROPIONATE 1 SPRAY: 50 SPRAY, METERED NASAL at 21:08

## 2023-10-13 RX ADMIN — OLANZAPINE 10 MG: 10 INJECTION, POWDER, FOR SOLUTION INTRAMUSCULAR at 09:33

## 2023-10-13 RX ADMIN — FLUTICASONE FUROATE AND VILANTEROL TRIFENATATE 1 PUFF: 200; 25 POWDER RESPIRATORY (INHALATION) at 08:57

## 2023-10-13 RX ADMIN — FLUTICASONE PROPIONATE 1 SPRAY: 50 SPRAY, METERED NASAL at 08:57

## 2023-10-13 RX ADMIN — OLANZAPINE 10 MG: 10 TABLET, FILM COATED ORAL at 22:55

## 2023-10-13 RX ADMIN — POLYETHYLENE GLYCOL 3350 17 G: 17 POWDER, FOR SOLUTION ORAL at 19:14

## 2023-10-13 RX ADMIN — RISPERIDONE 1 MG: 1 TABLET ORAL at 19:14

## 2023-10-13 RX ADMIN — TRAZODONE HYDROCHLORIDE 50 MG: 50 TABLET ORAL at 22:55

## 2023-10-13 ASSESSMENT — ACTIVITIES OF DAILY LIVING (ADL)
ADLS_ACUITY_SCORE: 35
ADLS_ACUITY_SCORE: 35
ADLS_ACUITY_SCORE: 28
ADLS_ACUITY_SCORE: 35
ADLS_ACUITY_SCORE: 28
ADLS_ACUITY_SCORE: 28
ADLS_ACUITY_SCORE: 35

## 2023-10-13 NOTE — ED NOTES
EMS here, report given. Patient cooperative with vitals and getting on cot. Mother and sisters phone number written on paper and given to patient. Belongings removed from DEC office and verified with mother that they were all there and they are going with him via EMS.

## 2023-10-13 NOTE — TELEPHONE ENCOUNTER
R: [place]  Access Inpatient Bed Call Log 10/13/23 @ 12:30am   Intake has called facilities that have not updated their bed status within the last 12 hours.??     *METRO:  [place] -- [facility]: @ cap per website.  [place] -- [facility]:  @ cap per website.  [place] -- [facility]: POSTING 1 BED.  [place] -- [facility]: TEMPORARILY CLOSED DUE TO COVID EXPOSURE.  [place] -- [facility]: POSTING 2 BEDS.  [facility]: @ cap per website.  [facility]/ beds - POSTING 1 BED. Ages 18-25, Voluntary only, COVID test req'd, NO aggression, physical or sexual assault, violence hx or schizoaffective d/o  [place] -- [facility]: @ cap per website.  [place] -- [facility]: @ cap per website.  [place] -- [facility]: @ cap per website.    *STATEWIDE (by distance):  [facility]: POSTING 5 BEDS. Mixed unit/Low acuity only.   [facility]: @ cap per website. Low acuity, No aggression  [facility]: @ cap per website.   [facility]:  @ cap per website. Low acuity only. No current aggression.  [facility]:  @ cap per website. COVID negative test req. Lower acuity only  [facility]: POSTING 2 BEDS. Low acuity only. Prefer med adjustments placement.  [facility]: POSTING 2 BEDS.  No aggression  [place] -- [facility]: POSTING 1 BED. NO reviews after 10PM. Low acuity only.  [place] -- [facility]: POSTING 3 BEDS. No hx of aggression. No sexual offenders. Voluntary patients only.  [facility]: POSTING 9 BEDS. Low acuity only. Must have the cognitive ability to do programming. No aggressive or violent behavior or recent HX in the last 2 yrs. MH must be primary.   [place] [facility]: @ cap per website.  COVID negative test. Must be low acuity ONLY.  [place] -- [facility]: @ cap per website. Low acuity. Negative Covid.   [place] -- [facility]: POSTING 2 BEDS. Covid neg. Voluntary only.  Mixed unit of adults & adol. No aggressive or violent behavior. No registered sex offenders.   [place] -- [facility]: POSTING 3 BEDS. COVID negative test   [facility]: POSTING 2 BEDS. No hx of aggression/assault. No lines, drains or tubes. Does not provide detox or CD treatment.   [place] -- [facility]: POSTING 6 BEDS. Out-of-State Facility.  [place] -- [facility]: POSTING 3 BEDS. Mixed unit/Low acuity/no medical devices - IV, CPAP etc.      Pt remains on waitlist pending appropriate placement availability

## 2023-10-13 NOTE — ED NOTES
Pt given toothbrush, toothpaste, and comb. Pt stated he will not harm himself or anyone with it. Pt requested for shower, charge nurse aware.

## 2023-10-13 NOTE — ED NOTES
Pt coming in and out of room talking loudly and randomly yelling in the hallway. Pt's pants fell down and nurse asked pt to pull it up and he looked at security and asked him to pull it up. Crackers, juice, and water given to pt. Pt accepted.

## 2023-10-13 NOTE — ED NOTES
RN ED Mental Health Handoff Note    72 hour hold- commitment process    Does patient require 1:1? No    Hold and rights been given and documented for patient: Yes    Is the patient in  scrubs? Yes    Has the patient been searched? Yes    Is the 15 minute observation tool up to date? Yes    Was patient issued a welcome folder? Yes    Room check completed this shift: Yes    PSS3 and [place] Assessment/Reassessment this shift:    PSS-3      Date and Time Over the past 2 weeks have you felt down, depressed, or hopeless? Over the past 2 weeks have you had thoughts of killing yourself? Have you ever attempted to kill yourself? When did this last happen? User   10/09/23 0108 yes no no -- MM          C-SSRS ([place])      Date and Time Q1 Wished to be Dead (Past Month) Q2 Suicidal Thoughts (Past Month) Q3 Suicidal Thought Method Q4 Suicidal Intent without Specific Plan Q5 Suicide Intent with Specific Plan Q6 Suicide Behavior (Lifetime) Within the Past 3 Months? RETIRED: Level of Risk per Screen Screening Not Complete User   10/09/23 1157 -- -- -- -- -- no -- -- -- NG   10/09/23 1022 yes yes yes no no -- -- -- -- NG            Behavioral status of patient: Yellow. Pt is labile, appears to be responding to internal stimuli. Pt was placed in seclusion for a short period of time d/t not being able to be redirected and repeatedly leaving room. Pt took IM zyprexa without issue and calmed down.    Code 21 called this shift? No    Use of restraints/seclusion this shift? Yes. Details: Seclusion used for a short time for repeatedly exiting room and refusing to go back in when asked    Most recent vital signs:  Temp: 97.7  F (36.5  C)   BP: 138/84 Pulse: 100   Resp: 16 SpO2: 100 % O2 Device: None (Room air)      Medications:  Scheduled medication compliance? Yes    PRN Meds administered this shift? Yes    Medications   OLANZapine zydis (zyPREXA) ODT tab 10 mg (0 mg Oral Return to Cabinet 10/13/23 5414)   fluticasone-vilanterol  (BREO ELLIPTA) 200-25 MCG/ACT inhaler 1 puff (1 puff Inhalation $Given 10/13/23 0857)   fluticasone (FLONASE) 50 MCG/ACT spray 1 spray (1 spray Both Nostrils $Given 10/13/23 0857)   polyethylene glycol (MIRALAX) Packet 17 g (17 g Oral $Given 10/13/23 0857)   sennosides (SENOKOT) tablet 1 tablet (has no administration in time range)   OLANZapine (zyPREXA) injection 10 mg (10 mg Intramuscular $Given 10/13/23 0933)   acetaminophen (TYLENOL) tablet 1,000 mg (1,000 mg Oral $Given 10/9/23 0146)   haloperidol lactate (HALDOL) injection 5 mg (5 mg Intramuscular $Given 10/10/23 2105)   LORazepam (ATIVAN) injection 1 mg (1 mg Intramuscular $Given 10/10/23 2105)   diphenhydrAMINE (BENADRYL) injection 25 mg (25 mg Intramuscular $Given 10/10/23 2105)   OLANZapine (zyPREXA) injection 10 mg (10 mg Intramuscular $Given 10/12/23 0038)   OLANZapine (zyPREXA) injection 10 mg (10 mg Intramuscular $Given 10/12/23 0450)   OLANZapine (zyPREXA) injection 10 mg (10 mg Intramuscular $Given 10/12/23 1403)         ADLs    Meal Provided this shift? Yes    Hygiene items provided? Yes    ADLs completed? Yes    Date of last shower: PTA- pt is requesting a shower    Any significant events this shift? No    Any information that would be helpful in caring for this patient?  Pt is responding to internal stimuli. Pt has made it known that he wants to leave and should be considered a high flight risk if a shower is provided. Pt takes medication without issue.     Family present/updated? No    Location of patient's belongings: DEC office    Critical Care Minutes:  Does the patient need critical care minutes documented? No

## 2023-10-13 NOTE — CARE PLAN
10/13/23 1757   Patient Belongings   Patient Belongings locker   Patient Belongings Put in Hospital Secure Location (Security or Locker, etc.) cell phone/electronics;clothing;shoes   Belongings Search Yes   Clothing Search Yes   Second Staff Mamu       White plastic bag    Clothing:  White T-Shirt  Black shorts  Underwear   Gray Sweat pants (Has white sheet of paper with phone numbers in the pocket)  Yellow Hat  Black hoodie   Black and white Nike shoes, gray blanket    Electronics:  Pushforhone    Orange ball     Items brought in on 10/27:    Body towel  Backpack  Tote bag    Valentino cologne  Toiletries  2 shorts (w/strings)  2 pair socks  3 boxers    ..A               Admission:  I am responsible for any personal items that are not sent to the safe or pharmacy.  [facility] is not responsible for loss, theft or damage of any property in my possession.    Signature:  _________________________________ Date: _______  Time: _____                                              Staff Signature:  ____________________________ Date: ________  Time: _____      2nd Staff person, if patient is unable/unwilling to sign:    Signature: ________________________________ Date: ________  Time: _____     Discharge:  [facility] has returned all of my personal belongings:    Signature: _________________________________ Date: ________  Time: _____                                          Staff Signature:  ____________________________ Date: ________  Time: _____

## 2023-10-13 NOTE — ED NOTES
Pt making vulgar statements to nurse and getting aggressive with security. Pt requesting to use the bathroom, on the way to the bathroom pt making racist statements towards security. Pt requested to go back into room, once in room he yelled out a few times and made some hand gestures while laying in bed.

## 2023-10-13 NOTE — ED NOTES
Pt repeatedly coming to door, requesting different items. Pt becoming increasingly more difficult to verbally redirect. Pt refusing to remain in room when asked by security. Due to other patient safety and the safety of staff pt placed in seclusion. MD notified and ordered restraint.

## 2023-10-13 NOTE — ED NOTES
Cleaned pt's table and threw out trash. Pt stated he no longer needed his list of phone numbers and ripped it up.

## 2023-10-13 NOTE — ED NOTES
Called to request update on ETA of EMS crew to transfer patient. They state it will be 1615 -1630. Mother and patient updated on this. Patient up to bathroom, cooperative and willing to stay in room when asked.

## 2023-10-13 NOTE — TELEPHONE ENCOUNTER
11:03 AM: Cathy called to inform that Pt can admit to station 30 after 1 PM, d/t needing an SIO and needing a private bed.

## 2023-10-13 NOTE — ED NOTES
Pt coming in and out room and pushing boundaries with nurse and security. Provider updated and wants to wait on prn meds.

## 2023-10-13 NOTE — ED NOTES
Pt took IM zyprexa without issue. Pt continues to appear to be responding to internal stimuli. Pt's behavior is labile.

## 2023-10-13 NOTE — ED NOTES
RN ED Mental Health Handoff Note    72 hour hold    Does patient require 1:1? No    Hold and rights been given and documented for patient: Yes    Is the patient in BH scrubs? Yes, only on top. Pants do not fit pt.    Has the patient been searched? Yes    Is the 15 minute observation tool up to date? Yes    Was patient issued a welcome folder? Yes    Room check completed this shift: Yes    PSS3 and [place] Assessment/Reassessment this shift:    PSS-3      Date and Time Over the past 2 weeks have you felt down, depressed, or hopeless? Over the past 2 weeks have you had thoughts of killing yourself? Have you ever attempted to kill yourself? When did this last happen? User   10/09/23 0108 yes no no -- MM          C-SSRS ([place])      Date and Time Q1 Wished to be Dead (Past Month) Q2 Suicidal Thoughts (Past Month) Q3 Suicidal Thought Method Q4 Suicidal Intent without Specific Plan Q5 Suicide Intent with Specific Plan Q6 Suicide Behavior (Lifetime) Within the Past 3 Months? RETIRED: Level of Risk per Screen Screening Not Complete User   10/09/23 1157 -- -- -- -- -- no -- -- -- NG   10/09/23 1022 yes yes yes no no -- -- -- -- NG            Behavioral status of patient: Yellow. Pt coming in and out of room multiple times and pushing boundary lines with staff. Will not return to his room after nurse and security has asked him multiple times.    Code 21 called this shift? No    Use of restraints/seclusion this shift? Yes. Details: Seclusion, refer to above statement.    Most recent vital signs:                       Medications:  Scheduled medication compliance? Yes    PRN Meds administered this shift? No    Medications   OLANZapine zydis (zyPREXA) ODT tab 10 mg (10 mg Oral $Given 10/11/23 1229)   fluticasone-vilanterol (BREO ELLIPTA) 200-25 MCG/ACT inhaler 1 puff (1 puff Inhalation $Given 10/12/23 1159)   fluticasone (FLONASE) 50 MCG/ACT spray 1 spray (1 spray Both Nostrils $Given 10/12/23 1948)   polyethylene glycol  (MIRALAX) Packet 17 g (17 g Oral Not Given 10/11/23 1223)   sennosides (SENOKOT) tablet 1 tablet (has no administration in time range)   acetaminophen (TYLENOL) tablet 1,000 mg (1,000 mg Oral $Given 10/9/23 0146)   haloperidol lactate (HALDOL) injection 5 mg (5 mg Intramuscular $Given 10/10/23 2105)   LORazepam (ATIVAN) injection 1 mg (1 mg Intramuscular $Given 10/10/23 2105)   diphenhydrAMINE (BENADRYL) injection 25 mg (25 mg Intramuscular $Given 10/10/23 2105)   OLANZapine (zyPREXA) injection 10 mg (10 mg Intramuscular $Given 10/12/23 0038)   OLANZapine (zyPREXA) injection 10 mg (10 mg Intramuscular $Given 10/12/23 0450)   OLANZapine (zyPREXA) injection 10 mg (10 mg Intramuscular $Given 10/12/23 1403)         ADLs    Meal Provided this shift? Yes    Hygiene items provided? Yes    ADLs completed? Yes    Date of last shower: unknown    Any significant events this shift? Yes. Details: Seclusion    Any information that would be helpful in caring for this patient?  Be firm and stand by it.    Family present/updated? No    Location of patient's belongings: DEC office    Critical Care Minutes:  Does the patient need critical care minutes documented? No

## 2023-10-13 NOTE — TELEPHONE ENCOUNTER
R: paged Wiley at 9:59 am.   Wiley called at 10:04 am saying he will review chart and will call back.   Author received a message that at 11:03 AM: Cathy called to inform that Pt can admit to station 30 after 1 PM, d/t needing an SIO and needing a private bed.     #30/wiley accepted for himself           unit informed at 11:30 am, er informed at 11:36 am                  mbw    Addendum: author called ED at 1:03 pm and left message asking for MD to place admit orders for pt. [email or web address]

## 2023-10-13 NOTE — ED NOTES
Swept up pt's floors, had house keeping come and mop it and pt declined. Pt went to bathroom and while returning to room he threw his pants on the floor, turned around and started playing basketball with his wet paper towels. Redirected pt back into his room.

## 2023-10-13 NOTE — ED NOTES
RN ED Mental Health Handoff Note    Assumed care of patient from 23:00-07:00 - pt up multiple times in the night. Pt exhibited calm and appropriate behaviors. Later in shift pt pacing room and talking to self. He raises a fist towards the cameras at times. No aggressive behaviors directed towards staff during this shift.    72 hour hold    Does patient require 1:1? No    Hold and rights been given and documented for patient: Yes    Is the patient in  scrubs? Yes    Has the patient been searched? Yes    Is the 15 minute observation tool up to date? Yes    Was patient issued a welcome folder? Yes    Room check completed this shift: Yes    PSS3 and [place] Assessment/Reassessment this shift:    PSS-3      Date and Time Over the past 2 weeks have you felt down, depressed, or hopeless? Over the past 2 weeks have you had thoughts of killing yourself? Have you ever attempted to kill yourself? When did this last happen? User   10/09/23 0108 yes no no -- MM          C-SSRS ([place])      Date and Time Q1 Wished to be Dead (Past Month) Q2 Suicidal Thoughts (Past Month) Q3 Suicidal Thought Method Q4 Suicidal Intent without Specific Plan Q5 Suicide Intent with Specific Plan Q6 Suicide Behavior (Lifetime) Within the Past 3 Months? RETIRED: Level of Risk per Screen Screening Not Complete User   10/09/23 1157 -- -- -- -- -- no -- -- -- NG   10/09/23 1022 yes yes yes no no -- -- -- -- NG            Behavioral status of patient: Yellow. Pt up frequently, requesting needs with staff, using bathroom. Redirectable.    Code 21 called this shift? No    Use of restraints/seclusion this shift? No - seclusion discontinued at 23:20    Most recent vital signs:      BP: (!) 144/83 Pulse: 109   Resp: 20 SpO2: 100 % O2 Device: None (Room air)      Medications:  Scheduled medication compliance? Yes    PRN Meds administered this shift? No    Medications   OLANZapine zydis (zyPREXA) ODT tab 10 mg (10 mg Oral $Given 10/11/23 9986)    fluticasone-vilanterol (BREO ELLIPTA) 200-25 MCG/ACT inhaler 1 puff (1 puff Inhalation $Given 10/12/23 1159)   fluticasone (FLONASE) 50 MCG/ACT spray 1 spray (1 spray Both Nostrils $Given 10/12/23 1948)   polyethylene glycol (MIRALAX) Packet 17 g (17 g Oral Not Given 10/11/23 1223)   sennosides (SENOKOT) tablet 1 tablet (has no administration in time range)   acetaminophen (TYLENOL) tablet 1,000 mg (1,000 mg Oral $Given 10/9/23 0146)   haloperidol lactate (HALDOL) injection 5 mg (5 mg Intramuscular $Given 10/10/23 2105)   LORazepam (ATIVAN) injection 1 mg (1 mg Intramuscular $Given 10/10/23 2105)   diphenhydrAMINE (BENADRYL) injection 25 mg (25 mg Intramuscular $Given 10/10/23 2105)   OLANZapine (zyPREXA) injection 10 mg (10 mg Intramuscular $Given 10/12/23 0038)   OLANZapine (zyPREXA) injection 10 mg (10 mg Intramuscular $Given 10/12/23 0450)   OLANZapine (zyPREXA) injection 10 mg (10 mg Intramuscular $Given 10/12/23 1403)         ADLs    Meal Provided this shift? No    Hygiene items provided? No    ADLs completed? No    Date of last shower: unknown    Any significant events this shift? No    Any information that would be helpful in caring for this patient?  Be assertive and hold boundaries    Family present/updated? No    Location of patient's belongings: DEC Office    Critical Care Minutes:  Does the patient need critical care minutes documented? No

## 2023-10-14 PROBLEM — F25.9 SCHIZOPHRENIA, SCHIZOAFFECTIVE, CHRONIC WITH ACUTE EXACERBATION (H): Status: ACTIVE | Noted: 2023-10-14

## 2023-10-14 LAB
CHOLEST SERPL-MCNC: 147 MG/DL
HBA1C MFR BLD: 5.6 %
HDLC SERPL-MCNC: 35 MG/DL
LDLC SERPL CALC-MCNC: 88 MG/DL
NONHDLC SERPL-MCNC: 112 MG/DL
TRIGL SERPL-MCNC: 118 MG/DL

## 2023-10-14 PROCEDURE — 83036 HEMOGLOBIN GLYCOSYLATED A1C: CPT | Performed by: PSYCHIATRY & NEUROLOGY

## 2023-10-14 PROCEDURE — 99222 1ST HOSP IP/OBS MODERATE 55: CPT | Mod: AI | Performed by: PSYCHIATRY & NEUROLOGY

## 2023-10-14 PROCEDURE — 80061 LIPID PANEL: CPT | Performed by: PSYCHIATRY & NEUROLOGY

## 2023-10-14 PROCEDURE — 124N000002 HC R&B MH UMMC

## 2023-10-14 PROCEDURE — 250N000013 HC RX MED GY IP 250 OP 250 PS 637: Performed by: PSYCHIATRY & NEUROLOGY

## 2023-10-14 PROCEDURE — 36415 COLL VENOUS BLD VENIPUNCTURE: CPT | Performed by: PSYCHIATRY & NEUROLOGY

## 2023-10-14 RX ADMIN — RISPERIDONE 1 MG: 1 TABLET ORAL at 08:49

## 2023-10-14 RX ADMIN — FLUTICASONE FUROATE AND VILANTEROL TRIFENATATE 1 PUFF: 200; 25 POWDER RESPIRATORY (INHALATION) at 06:19

## 2023-10-14 RX ADMIN — RISPERIDONE 1 MG: 1 TABLET ORAL at 21:07

## 2023-10-14 RX ADMIN — FLUTICASONE PROPIONATE 1 SPRAY: 50 SPRAY, METERED NASAL at 06:21

## 2023-10-14 RX ADMIN — POLYETHYLENE GLYCOL 3350 17 G: 17 POWDER, FOR SOLUTION ORAL at 08:49

## 2023-10-14 RX ADMIN — FLUTICASONE PROPIONATE 1 SPRAY: 50 SPRAY, METERED NASAL at 21:07

## 2023-10-14 ASSESSMENT — ACTIVITIES OF DAILY LIVING (ADL)
ADLS_ACUITY_SCORE: 28
ORAL_HYGIENE: INDEPENDENT
DRESS: SCRUBS (BEHAVIORAL HEALTH);INDEPENDENT
ADLS_ACUITY_SCORE: 28
LAUNDRY: UNABLE TO COMPLETE
ADLS_ACUITY_SCORE: 28
HYGIENE/GROOMING: INDEPENDENT
ORAL_HYGIENE: INDEPENDENT
HYGIENE/GROOMING: INDEPENDENT;HANDWASHING;SHOWER
LAUNDRY: UNABLE TO COMPLETE
ADLS_ACUITY_SCORE: 28
ADLS_ACUITY_SCORE: 28
DRESS: INDEPENDENT
ADLS_ACUITY_SCORE: 28
ADLS_ACUITY_SCORE: 28

## 2023-10-14 NOTE — PROGRESS NOTES
"Per chart review, Moe is a 22 year old male with a history of schizophrenia who was taken on 10/09/23 to  ED by father due to decompensation in mental health since recent discharge from [facility] last week. Pt is presenting as non-compliant, requesting to return home, showing bizarre behavior, odd responses such as laughing out loud inappropriately, disorganized, verbally abrasive and having active psychosis concerns. Per collateral, pt has been making ideation comments and has had increased aggression in the home by breaking items and punching walls. Pt is currently on a commitment with [facility].   Pt admitted into unit 30 at 1735 and pt came via stretcher. Pt taken into the intake room and pt refused cooperating with search and refused to give his cell phone. Pt used his cell phone and called 911 and told the 911 person we are holding him against his will and his 72 hours is over. When the 911 staff asked pt where his calling from and pt stated \"[place]\". Pt decided to leave and came in the [facility] area with his cell phone. Pt making calls and video chart from his cell phone. Code green first called and upgraded to code 21 with security present. With the present of security, pt then complied and handed his cell phone on condition he keep his short on. Pt then cooperated with search and later showered and changed into scrubs. Pt then complied with assessment and denies all mental health psych symptoms and contracted for safety. Oriented to the unit rules and activities and he verbalized understanding. Pt ate dinner and complied with medications. Pt stated he usually have trouble urinating and pt voiding large amount in the intake room and this writer offered to bladder scan him but he deferred it for tomorrow.   "

## 2023-10-14 NOTE — PHARMACY-ADMISSION MEDICATION HISTORY
Please see Admission Medication History note completed by a pharmacist on 10/09/2023 under previous encounter at [facility] for information regarding prior to admission medications.    Based on chart review, pt did receive his Haldol Deconate injection 10/6 based on home care visit in chart on 10/6/23. Next due in 4 weeks.      Tisha Dorado, PharmD, BCPP  Behavioral Health Pharmacist  [facility] Ascom: *[phone] or *[phone]
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
## 2023-10-14 NOTE — H&P
"      Chief Complaint:   Patient admitted due to psychosis        HPI:     22 year old male     Patient has a history of schizophrenia and is currently on a commitment. He was first admitted in 2021 with one year of worsening psychotic symptoms. Stabilized at that time with Haldol.    More recently patient was admitted to [facility] one month ago and discharged 2 weeks ago. He is currently on a commitment and is on Haldol Decanoate and was last due to get injection on Oct 6, so this will need clarification.    Patient was brought again to the ER on 10/9/23 due to worsening psychosis and increasing disorganization. There was concern of drug use and patient did admit to using Delta 8.     Patient remains very disorganized and psychotic. He is blocked and distracted. He can not coherently answer any of my questions and only makes delayed responses and nonsensical statements. He appears physically comfortable.       According to ER report:  GILBERT Doe is a 22 year old male with history of schizophrenia who presents for mental health evaluation and ear pain.  The patient states that he has been having L. ear pain for the last few days. The patient denies any drug or alcohol usage. States that he used to use \"delta 8\" and states that he hasn't used it in a long time. He admits to visual hallucinations \"I see smoke.\"  He states he wants to hurt himself by \"jumping on a couch.\"  He states \"I don't know if I want to die, maybe I do.\" No fever, cough or other symptoms.      Independent Historian:   The father states that he is worried about the patients safety and the safety of those around him. He states that the patient lives with his mom and today, mom called father and asked him to be taken to ED. The father states that en route, the patient urinated on the curb outside as well as opened the car door while it was moving. He is worried that the patient is not sleeping and would like him to get psychiatric help. Patient " was given haldol IM injection on 10/6 at home visit which he receives monthly.     Medical Decision Making:  Patient is a 22-year-old male presenting for mental health evaluation.  He also complains of left ear pain on arrival.  He is nontoxic, cooperative at bedside.  He has a flat affect and admits to visual hallucinations.  Father expresses that he tried to jump out of the car on the way here and expresses concern for mental health decompensation.  Dpubt metabolic derangements or toxidrome.  Patient was medically cleared.  Regarding his ear complaints, noted cerumen impaction.  I did discuss recommendation for irrigation of patient's ear though he is adamantly declining.  Father expressed understanding that cannot exclude underlying otitis media/perforation.  No evidence to suggest otitis externa/mastoiditis based on exam.  He is signed out to my partner pending DEC evaluation.  He is currently on an STERLING.    10/9/2023   Delilah Madison, DO         According to DEC assessment done in ER:  Referral Data and Chief Complaint  Moe Doe presents to the ED with family/friends. Patient is presenting to the ED for the following concerns: Verbal agitation, Suicidal ideation, Significant behavioral change.   Factors that make the mental health crisis life threatening or complex are:  Father brought pt into the ED after pt has had large change in presntation, with concerns for psychosis and suicidal ideation..      History of the Crisis   Information was attempted to be gathered from patient, though due to presentation and decompensation, information gathered was limited. Collateral from Commitment , parents and patient were obtained in addition to observation with patient. This past evening, pt s mother called pt s father, requesting help bringing pt to ED due to decompensation in behavior. Since being in ED, pt has shown psychosis concerns such as making odd gestures, reporting visual hallucinations and  inappropriately laughing. Pt also was reporting SI to ED staff and father.  Pt has been verbally abrasive and refusing most services and labs. During this encounter with LMHP, per nurse, pt had just eaten breakfast and was awake, though when writer went into room, pt placed hat on head, had eyes closed and refused to respond to information outside of  what is this . Pt became non responsive and looked as though he was sleeping. LMHP reattempted again with help of nurse. Pt was brought in snacks and seen sitting up, looking about the room and eating. When nurse was present, pt asked nurse if LMHP was  telling the truth  nurse encouraged him to participate. During this time, pt was asked about SI, pt reported no, then when asked about comments made to family and ED staff, pt then laughed and looked away, continuing to eat. When asked about HI pt stated  yes no, yes, no yes,no  and was unable to confirm answer. LMHP attempted to gain rapport with pt though was limited as pt was not willing to engage and would state he doesn t know where he lives. At one point, pt s father came in the room to help assist with pt to comply with DEC though pt then became slightly agitated stating  why is she asking me personal information  and stated  she is racists  in regards to LMHP. Pts father had to encourage him to participate. Once father left room, LMHP continued to attempt to with patient, though pt would respond with  none of your fucking business  or  you stupid fucking ass . LMHP then decided to observe patient, rather than asking questions due to agitation. Pt was seen looking about the room, then aggressively turned dead toward the wall and stated  I am going to find you , pt then began inappropriately laughing and was seen engaging in hand movements, almost similar to ASL, though family denied pt knowing this. When LMHP asked what pt was doing, he began putting his finger over his mouth to gesture  shhh  and continued with  his hand movements and looking about the room.     Brief Psychosocial History  Family:  Single, Children no  Support System:  Parent(s), Sibling(s)  Employment Status:  unemployed  Source of Income:  other (see comments), public assistance (support from family)  Financial Environmental Concerns:  unemployed  Current Hobbies:   (pt unable to note to this [facility].)  Barriers in Personal Life:  mental health concerns, behavioral concerns     Significant Clinical History  Current Anxiety Symptoms:     Current Depression/Trauma:  difficulty concentrating, avoidance, thoughts of death/suicide  Current Somatic Symptoms:  anxious  Current Psychosis/Thought Disturbance:     Current Eating Symptoms:   (unable to assess, though pt was seen eating throughout encounter)  Chemical Use History:  Alcohol: None  Benzodiazepines: None  Opiates: None  Cocaine: None  Marijuana:  (Pt reported Delta use, though details on last date of use or method are unknown.)  Last Use::  (Unknown)  Other Use: None  Withdrawal Symptoms:  (Unable to assess.)  Addictions:  (Unable to assess.)   Past diagnosis:  Schizophrenia  Family history:  No known history of mental health or chemical health concerns  Past treatment:  Case management, Civil Commitment, Psychiatric Medication Management, Inpatient Hospitalization, Other (Nurse coming into home for medications)  Details of most recent treatment:  Pt is currently on a commitment through [facility],  is Maryam Miranda [phone]. Pt is suppose to be taking injection medications and attend out-patient supports per commitment requirements. Pt has a psychiatrist and has a nurse that comes into the home for his injections.  Other relevant history:  Pt was recently admitted for MH through [facility], Discharged on 10.2.23. Another prior admission through [facility] in September of 2021.  Pt has history of commitment, which is still active. Pt has history of medications and .        Collateral  "Information  Collateral information name, relationship, phone number:  Morgan Frankel's Father     What happened today: Father brought pt at 1AM in ED. Pt has been staying with his mother, though father speaks frequently with them. Mother called father, noting concerns and pt needing to be seen in ED. Pt was trying to jump out of the car, opening windows and doors on way to ED. Father would have to pull over and \"lie to him\" about where they were going to get him to go to hospital. Per father, since being in ED, pt continues to ask to return home.      What is different about patient's functioning: Pt was discharged last week from in-pt mental health. Pt has been getting injection for his MH. Father noted medications have \"not helped. Pt has not been sleeping. Pt has been punching walls, pacing around the house, is not dressing properly and asking questions and making comments that don't make sense. Father reports \"he is in state of crisis\". Per father, pt is \"not logical\" and is not safe to return home due to presentation.      Concern about alcohol/drug use:  Unknown      What do you think the patient needs:  Admission for MH.      Has patient made comments about wanting to kill themselves/others: yes     If d/c is recommended, can they take part in safety/aftercare planning:  no     Additional collateral information:  Pt has history of physical aggression towards family and breaking items in the house such as door. Pt has a commitment . Father believes pt needs help with MH and needing admission.  is [facility] through [facility]; [phone]. Pt has a psychiatrist. Unknown if pt uses any substances now, though pt told ED staff some history of this. Pt has history of asthma, and uses inhaler. Pt is not currently working, history of working at [employer] though cannot maintain work due to MI. LMHP spoke pts mother, alley celestin. Pt hasn't been sleeping, listening to loud music, Pt often is " "unable to understand questions when asked. Pt has been talking to self, pacing around the house. Mother noted pt has historically been taking medications (haldol) and was \"doing well' though over the past couple of months has been declining with new prescribed medications. Since discharge from hospital, per mother, Pt has been hearing voices and putting fingers in his ears to stop this. Mother called pt's father due to pt's presentation. Mother noted pt would benefit from increased physical labs to ensure he is physically clear and medication of haldol as he did well on this in the past. Mother also reports Pt has been kicking the wall. [facility] spoke with pts CW Maryam Miranda who noted pt was recently discharged from [facility]. CW noted she will work on revocation for in-patient care for MH though if pt is attempting to leave ED, okay for [facility]HH paperwork to be placed. CW shared that pt would benefit from [facility] care as he historically has gone there for placement.      Clinical Summary and Substantiation of Recommendations   Pt is a 22 year old male with a history of schizophrenia who is brought into to ED by father due to decompensation in MH since recent discharge from [facility] last week. Pt is presenting as non-compliant, requesting to return home, showing bizarre behavior, odd responses such as laughing out loud inappropriately, disorganized, verbally abrasive and having active psychosis concerns. Per collateral, pt has been making ideation comments and has had increased aggression in the home by breaking items and punching walls. Pt is currently on a commitment with [facility], who was contacted today and supports admission for  though recommended 72HH hold until paperwork can be finished to support such care. CW is requesting [facility] for continuation of care, though is aware with 72HH pt can be placed aware. Intake was called and is aware of [facility] preference per commitment . Pt displays the following risk " factors that support IP admission: Recent discharge from hospital, psychosis concerns and large decompensation in behavior. Pt is unable to engage in safety planning to mitigate risk level in a non-secure setting. Lower levels of care have not been effective in mitigating risk. Due to this IP is the least restrictive option of care for pt. Pt should remain in IP until deemed safe to return to the community and engage in OP  supports.     Angelina Ding [facility], Psychotherapist  DEC - Triage & Transition Services           Substance Use and History:     Delta 8 recently        Past Medical History:   PAST MEDICAL HISTORY:   Past Medical History:   Diagnosis Date    Uncomplicated asthma        PAST SURGICAL HISTORY:   Past Surgical History:   Procedure Laterality Date    ENT SURGERY               Family History:   FAMILY HISTORY: No family history on file.        Social History:   Please see the full psychosocial profile from the clinical treatment coordinator.   SOCIAL HISTORY:   Social History     Tobacco Use    Smoking status: Never    Smokeless tobacco: Never   Substance Use Topics    Alcohol use: Yes     Comment: sober for 2 weeks.  drinks sometimes     Lives with mother         PTA Medications:     Medications Prior to Admission   Medication Sig Dispense Refill Last Dose    budesonide-formoterol (SYMBICORT) 160-4.5 MCG/ACT Inhaler Inhale 2 puffs into the lungs two times daily       fluticasone (FLONASE) 50 MCG/ACT nasal spray Spray 1 spray into both nostrils 2 times daily       haloperidol decanoate (HALDOL DECANOATE) 100 MG/ML injection Inject 100 mg into the muscle every 28 (twenty-eight) days       polyethylene glycol (MIRALAX) 17 GM/Dose powder Take 17 g by mouth daily       senna (SENOKOT) 8.6 MG tablet Take 8.6 mg by mouth 2 times daily as needed for constipation       vitamin D2 (ERGOCALCIFEROL) 59570 units (1250 mcg) capsule Take 50,000 Units by mouth once a week               Current Medications:       fluticasone  1 spray Both Nostrils BID    fluticasone-vilanterol  1 puff Inhalation Daily    risperiDONE  1 mg Oral BID    Or    OLANZapine  5 mg Intramuscular BID    polyethylene glycol  17 g Oral Daily     acetaminophen, alum & mag hydroxide-simethicone, haloperidol **AND** LORazepam **AND** diphenhydrAMINE, haloperidol lactate **AND** LORazepam **AND** diphenhydrAMINE, hydrOXYzine, OLANZapine **OR** OLANZapine, sennosides, traZODone         Allergies:     Allergies   Allergen Reactions    No Known Drug Allergy           Labs:     Recent Results (from the past 72 hour(s))   Drug Abuse Screen Qual Urine    Collection Time: 10/12/23  4:27 AM   Result Value Ref Range    Amphetamines Urine Screen Negative Screen Negative    Barbituates Urine Screen Negative Screen Negative    Benzodiazepine Urine Screen Negative Screen Negative    Cannabinoids Urine Screen Positive (A) Screen Negative    Cocaine Urine Screen Negative Screen Negative    Fentanyl Qual Urine Screen Negative Screen Negative    Opiates Urine Screen Negative Screen Negative    PCP Urine Screen Negative Screen Negative   Hemoglobin A1c    Collection Time: 10/14/23  6:44 AM   Result Value Ref Range    Hemoglobin A1C 5.6 <5.7 %   Lipid panel    Collection Time: 10/14/23  6:44 AM   Result Value Ref Range    Cholesterol 147 <200 mg/dL    Triglycerides 118 <150 mg/dL    Direct Measure HDL 35 (L) >=40 mg/dL    LDL Cholesterol Calculated 88 <=100 mg/dL    Non HDL Cholesterol 112 <130 mg/dL          Physical Exam:     Temp 98.4  F (36.9  C) (Oral)   Wt 127.3 kg (280 lb 9.6 oz)   SpO2 97%   Weight is 280 lbs 9.6 oz  There is no height or weight on file to calculate BMI.    Physical Exam:  Gen: No acute distress  HEENT: EOMI, no nystagmus or scleral icterus, moist mucous membranes  Skin: No diaphoresis or rash           Physical ROS:   Patient could not participate in review due to disorganization           Mental Status Exam:     Mental Status  Patient is  casually dressed  Hygiene good  Speech fluent  Thought Process disorganized  Thought Content:  No suicidal ideation,    No homicidal ideation,   No ideas of reference,    No loose associations,    + auditory hallucinations, responds to internal stimuli,     No visual hallucinations   + delusions  Psychomotor: No agitation or slowing  Cognition:  Alert and oriented to time place and person  Attention good  Concentration poor  Memory normal including recent and remote memory  Mood:  euthymic  Affect: mood congruent  Judgement: poor  Eye contact good  Cooperation good  Language normal  Fund of knowledge normal  Musculoskeletal normal gait with no abnormal movements         Diagnoses:   Schizophrenia, schizoaffective, chronic with acute exacerbation (H)    Patient Active Problem List   Diagnosis    Behavior disturbance    Epistaxis    Sleep disorder    Intermittent asthma    Psychosis, unspecified psychosis type (H)    Schizophrenia (H)    Acute psychosis (H)              Assessment:     Patient with schizophrenia now presents with psychosis         Plan:     Legal: on commitment, and awaits revocation      Medication:   fluticasone  1 spray Both Nostrils BID    fluticasone-vilanterol  1 puff Inhalation Daily    risperiDONE  1 mg Oral BID    Or    OLANZapine  5 mg Intramuscular BID    polyethylene glycol  17 g Oral Daily     Will review Haldol decanoate and consider resuming this      Consults: Hospitalist will be consulted if medical issues arise      Multidisciplinary Interventions:  to gather collateral information, coordinate care with outpatient providers and begin follow up planning      Disposition: home vs IRTS        More than 60 minutes spent on this visit with more than 50% time spent on coordination of care with staff, reviewing medical record, psychoeducation, providing supportive therapy regarding coping with chronic mental illness, entering orders and preparing documentation for the  visit    Jostin Teague MD

## 2023-10-14 NOTE — PLAN OF CARE
Problem: Psychotic Signs/Symptoms  Goal: Improved Behavioral Control (Psychotic Signs/Symptoms)  Outcome: Progressing  Intervention: Manage Behavior  Recent Flowsheet Documentation  Taken 10/14/2023 1829 by Gregor Palacios RN  De-Escalation Techniques: 1:1 observation initiated  Goal: Optimal Cognitive Function (Psychotic Signs/Symptoms)  Intervention: Support and Promote Cognitive Ability  Recent Flowsheet Documentation  Taken 10/14/2023 1829 by Gregor Palacios RN  Trust Relationship/Rapport:   care explained   choices provided   emotional support provided   empathic listening provided   questions answered   questions encouraged   reassurance provided   thoughts/feelings acknowledged  Goal: Increased Participation and Engagement (Psychotic Signs/Symptoms)  Intervention: Facilitate Participation and Engagement  Recent Flowsheet Documentation  Taken 10/14/2023 1829 by Gregor Palacios RN  Diversional Activity: television  Goal: Improved Mood Symptoms (Psychotic Signs/Symptoms)  Intervention: Optimize Emotion and Mood  Recent Flowsheet Documentation  Taken 10/14/2023 1829 by Gregor Palacios RN  Diversional Activity: television  Goal: Improved Psychomotor Symptoms (Psychotic Signs/Symptoms)  Intervention: Manage Psychomotor Movement  Recent Flowsheet Documentation  Taken 10/14/2023 1829 by Gregor Palacios RN  Diversional Activity: television  Activity (Behavioral Health):   up ad stan   activity adjusted per tolerance  Goal: Enhanced Social, Occupational or Functional Skills (Psychotic Signs/Symptoms)  Intervention: Promote Social, Occupational and Functional Ability  Recent Flowsheet Documentation  Taken 10/14/2023 1829 by Gregor Palacios RN  Trust Relationship/Rapport:   care explained   choices provided   emotional support provided   empathic listening provided   questions answered   questions encouraged   reassurance provided   thoughts/feelings acknowledged        Patient isolated to his room at the  "beginning of shift and was observed sleeping comfortably in bed, got up for dinner and ate about 100% with adequate fluid intake. Patient reported that his menu was wrong so staff are making copies of his menu after he complete filling it out. [facility] watching television with peers but had limited interaction with them, he was observed on numerous occasions talking to the television and himself. Patient continues on SIO 2:1 for aggressive and psychosis behavior, none of those behavior exhibited by patient this shift. Patient pace the hallway responding to internal stimuli and being animated, requested personal blanket and was encourage by staff to relay this ideal to the team on Monday, his heart rate was 116 due to ambulating in the gonzales. Patient shower and reported feeling better after, denies auditory and visual hallucination, denies SI/SIB and HI. Patient was medication compliant with no cheeking noted. Had no aggressive and assaultive behavior, upset once when he requested his inhaler\" I'll beat you up if you don't give me the inhaler\" continues to be easily redirectable. Patient in bed around 2230.       /71   Pulse 116   Temp 98.1  F (36.7  C) (Oral)   Wt 127.3 kg (280 lb 9.6 oz)   SpO2 100%                       "

## 2023-10-14 NOTE — PLAN OF CARE
" INITIAL PSYCHOSOCIAL ASSESSMENT AND NOTE    Information for assessment was obtained from:       []Patient     []Parent     []Community provider    [x]Hospital records   []Other     []Guardian       Presenting Problem:  Patient is a 22 year old male who uses he/him. Patient was admitted to [facility] voluntarily on 10/13/2023.  He is currently on a 2 to 1.    Presenting issues and presentation for admit: Per chart review: \"Pt is a 22 year old male with a history of schizophrenia who was taken on 10/09/23 to  ED by father due to decompensation in mental health since recent [place] [facility] [place]. Pt is presenting as non-compliant, requesting to return home, showing bizarre behavior, odd responses such as laughing out loud inappropriately, disorganized, verbally abrasive and having active psychosis concerns. Per collateral, pt has been making ideation comments and has had increased aggression in the home by breaking items and punching walls. Pt is currently on a commitment with [facility]. \"      The following areas have been assessed:    History of Mental Health and Chemical Dependency:  Mental Health History: past diagnosis of schizophrenia.    Case management, Civil Commitment, Psychiatric Medication Management, Inpatient Hospitalization, Other (Nurse coming into home for medications)  Details of most recent treatment:  Pt is currently on a commitment through [facility],  is Maryam Miranda [phone]. Pt is suppose to be taking injection medications and attend out-patient supports per commitment requirements. Pt has a psychiatrist and has a nurse that comes into the home for his injections.  Other relevant history:  Pt was recently admitted for  through [facility], Discharged on 10.2.23. Another prior admission through [facility] in September of 2021.  Pt has history of commitment, which is still active. Pt has history of medications and " .  It appears he has a Bolanos Order.     Substance Use History  Recently, pt reported use of Delta 8 but no details are known.      Patient's current relationship status is   single and has no children.    Family Description (Constellation, significant information and events, Family Psychiatric History):   He is originally from the [place] but has been in and out of the [place].  He has been here now since January 2020.  Family disruption due to divorce 2007  father is a politician in [place],(agiriculture )     Family history of psychiatric hospitalization   pt's cousin was in inpt psych x 2 months for psychosis     Significant Medical issues, Life events or Trauma history:   Unknown if there is a trauma hx.    Pt moved to [facility] with mother, came back [place]     Living Situation:  pt appears to have been living with his mother.  He has a history of physical aggression toward family and property destruction, unknown if he is able to return there.     Educational Background:      special  high school in [place], unknown if he graduated.    Occupational and Financial Status:   Pt is not currently working, history of working at [employer] though cannot maintain work due to MI.     Legal Concerns (current or past history):       Unknown legal history     Service History: not likely    Ethnic/Cultural/Spiritual considerations:   The patient describes their cultural background as unable to assess at this time.   Spiritual considerations include: unable to assess    Social Functioning (organizations, interests, support system):   Family appears to be supportive.     Current Treatment Providers are:  Primary Care Provider:?  Trell Bledsoe MD (Oct. 02, 2023October 02, 2023 - Present)  [phone] (Work)  [phone] (Fax)  [address] [place] [address]     Medication Management/Psychiatry:  Dr. Wang at [facility] on 10/6/2023 Nicola Carias RN     Case  Manager/ACT Team:  . [place] [phone]       Social Service Assessment/Plan:  Patient will have psychiatric assessment and medication management by the psychiatrist. Medications will be reviewed and adjusted per DO/MD/ROB CNP as indicated. The treatment team will continue to assess and stabilize the patient's mental health symptoms with the use of medications and therapeutic programming. Hospital staff will provide a safe environment and a therapeutic milieu. Staff will continue to assess patient as needed. Patient will participate in unit groups and activities. Patient will receive individual and group support on the unit.      CTC will do individual inpatient treatment planning and after care planning. CTC will discuss options for increasing community supports with the patient. CTC will coordinate with outpatient providers and will place referrals to ensure appropriate follow up care is in place.

## 2023-10-14 NOTE — PLAN OF CARE
"Goal Outcome Evaluation:  Pt continues on SIO 2:1 for aggression threatening behavior  psychosis. Pt refused vital signs this morning and was made verbally abusive and threatening comments towards staff. Pt was medication compliant this morning. Pt ate 100% of both breakfast and lunch and asked for double portions order (obtained) pt asking about legal status.  When told he is committed and discharge will depend on Dr and stabilizing on medications. Pt did not escalate. Pt stated \" If I have good behavior that will help?\" Pt would abruptly end conversation, look away from staff during conversation assessment. Pt observed talking to self intermittently through the shift. Pt refused to comment when asked about hearing voices. Pt observed talking to his left sleeve or hand in the lounge.  Pt made sexually suggestive comment towards female RN and was redirected.                        "

## 2023-10-14 NOTE — PROVIDER NOTIFICATION
10/14/23 0600   Sleep/Rest   Sleep/Rest/Relaxation no problem identified   Sleep Hygiene Promotion noise level reduced;room lighting adjusted   Night Time # Hours 5 hours     Moe noted as sleeping peacefully through the night with no milieu disturbances, or complaints of distress. He woke up early, requested his inhaler and nasal spray, used them appropriately and returned to his room without much ado. He cooperated with lab (blood draw) this morning without any problems. He is presently in bed. He remains on 2:1 for safety. Will continue to monitor and to provide for safety and for comfort

## 2023-10-15 PROCEDURE — 250N000013 HC RX MED GY IP 250 OP 250 PS 637: Performed by: PSYCHIATRY & NEUROLOGY

## 2023-10-15 PROCEDURE — 124N000002 HC R&B MH UMMC

## 2023-10-15 RX ADMIN — FLUTICASONE FUROATE AND VILANTEROL TRIFENATATE 1 PUFF: 200; 25 POWDER RESPIRATORY (INHALATION) at 08:47

## 2023-10-15 RX ADMIN — FLUTICASONE PROPIONATE 1 SPRAY: 50 SPRAY, METERED NASAL at 08:47

## 2023-10-15 RX ADMIN — RISPERIDONE 1 MG: 1 TABLET ORAL at 08:47

## 2023-10-15 RX ADMIN — FLUTICASONE PROPIONATE 1 SPRAY: 50 SPRAY, METERED NASAL at 20:10

## 2023-10-15 RX ADMIN — RISPERIDONE 1 MG: 1 TABLET ORAL at 20:10

## 2023-10-15 RX ADMIN — POLYETHYLENE GLYCOL 3350 17 G: 17 POWDER, FOR SOLUTION ORAL at 08:47

## 2023-10-15 ASSESSMENT — ACTIVITIES OF DAILY LIVING (ADL)
ADLS_ACUITY_SCORE: 28

## 2023-10-15 NOTE — PLAN OF CARE
"  Problem: Plan of Care - These are the overarching goals to be used throughout the patient stay.    Goal: Absence of Hospital-Acquired Illness or Injury  Outcome: Progressing  Intervention: Identify and Manage Fall Risk  Recent Flowsheet Documentation  Taken 10/15/2023 0057 by Niko Moreira RN  Safety Promotion/Fall Prevention:   increased rounding and observation   increase visualization of patient   clutter free environment maintained   lighting adjusted   nonskid shoes/slippers when out of bed     Problem: Adult Behavioral Health Plan of Care  Goal: Adheres to Safety Considerations for Self and Others  Outcome: Progressing     Problem: Sleep Disturbance  Goal: Adequate Sleep/Rest  Outcome: Progressing   Goal Outcome Evaluation:       Patient continues on SIO 2:1 for psychosis, aggressiveness, and self injury behaviors. Patient was in his room calm, and quiet at the beginning of the shift. Patient woke up at 0240 used the bathroom, came out of his room, and requested to have orange juice, patient was given 2 orange juice, and string cheese, and escorted back to his room. Patient was heard talking to himself, and laughing so loudly while sitting on his bed. Patient came out of his room again at 0305, headed to the [address] acting up while jumping, and laughing, staff kept on following him for safety. Patient stated that \"I am hungry\" and was given packet of milk, and yoghurt, and redirected to his room. Patient appeared again on the hallway at 0400, patient appeared agitated, and intimidating but was cooperative, non-violent, and easily redirectable. Patient kept on asking for snacks each time he got out of his room. Writer approached patient twice, and asked him if he needed something for sleep/pain. Patient denied having pain stated that \"I don't need anything; I am just hungry\". Patient kept on going back and forth his room. Patient had 4.75 hours this shift. No other concerns at this time, nursing will " continue to monitor patient for needs.

## 2023-10-15 NOTE — PLAN OF CARE
"  Problem: Adult Behavioral Health Plan of Care  Goal: Optimized Coping Skills in Response to Life Stressors  Outcome: Progressing         Pt has spent 75% of the shift in the lounge, wearing headphones and watching TV while talking and laughing to self. Pt appears impulsive and speech is abrupt, appears to be thought blocking and disorganized, often changing his mind several times. Pt took 2 showers this shift, had a visit from his father, asking to walk with dad to vending machine or eat chinese food in dad's car, writer explained he could not leave unit and he was accepting of this but appeared frustrated. No SI/SIB behavior noted to reported, pt was in community meeting and stated he was mad and wanted to harm people. When writer followed up with him after he states it is reverse psychology and smiles. Pt is frequently responding to internal stimuli, raises fist and shakes it in the air, eye contact intense and intermittent, talks to self and distracted. When writer asked to check in with him he states \"I need to use the bathroom,\" was not interested when reapproached, only states he needs a snack whenever writer attempts to speak with him. Pt occasionally hopping on one foot and jogging in the [place]onz[place] in short spurts. Pt was medication compliant, no reported or observed medication SE. Pt denies pain, will continue to monitor and support plan of care.    Pt continues on SIO 2:1 for assault risk and SIB behaviors.                  "

## 2023-10-16 PROCEDURE — 99232 SBSQ HOSP IP/OBS MODERATE 35: CPT | Performed by: PSYCHIATRY & NEUROLOGY

## 2023-10-16 PROCEDURE — 124N000002 HC R&B MH UMMC

## 2023-10-16 PROCEDURE — 250N000013 HC RX MED GY IP 250 OP 250 PS 637: Performed by: PSYCHIATRY & NEUROLOGY

## 2023-10-16 RX ORDER — HALOPERIDOL 5 MG/ML
5 INJECTION INTRAMUSCULAR 2 TIMES DAILY
Status: DISCONTINUED | OUTPATIENT
Start: 2023-10-16 | End: 2023-10-25

## 2023-10-16 RX ORDER — HALOPERIDOL 5 MG/1
5 TABLET ORAL 2 TIMES DAILY
Status: DISCONTINUED | OUTPATIENT
Start: 2023-10-16 | End: 2023-10-25

## 2023-10-16 RX ADMIN — HALOPERIDOL 5 MG: 5 TABLET ORAL at 20:36

## 2023-10-16 RX ADMIN — FLUTICASONE PROPIONATE 1 SPRAY: 50 SPRAY, METERED NASAL at 20:38

## 2023-10-16 RX ADMIN — RISPERIDONE 1 MG: 1 TABLET ORAL at 08:51

## 2023-10-16 RX ADMIN — FLUTICASONE FUROATE AND VILANTEROL TRIFENATATE 1 PUFF: 200; 25 POWDER RESPIRATORY (INHALATION) at 08:51

## 2023-10-16 RX ADMIN — POLYETHYLENE GLYCOL 3350 17 G: 17 POWDER, FOR SOLUTION ORAL at 08:51

## 2023-10-16 RX ADMIN — FLUTICASONE PROPIONATE 1 SPRAY: 50 SPRAY, METERED NASAL at 08:51

## 2023-10-16 ASSESSMENT — ACTIVITIES OF DAILY LIVING (ADL)
ADLS_ACUITY_SCORE: 28
DRESS: INDEPENDENT
ORAL_HYGIENE: INDEPENDENT
ADLS_ACUITY_SCORE: 28
HYGIENE/GROOMING: INDEPENDENT
ADLS_ACUITY_SCORE: 28
ORAL_HYGIENE: INDEPENDENT
ADLS_ACUITY_SCORE: 28
ADLS_ACUITY_SCORE: 28
HYGIENE/GROOMING: HANDWASHING;SHOWER;INDEPENDENT
LAUNDRY: UNABLE TO COMPLETE
ADLS_ACUITY_SCORE: 28
DRESS: SCRUBS (BEHAVIORAL HEALTH);INDEPENDENT
ADLS_ACUITY_SCORE: 28
ADLS_ACUITY_SCORE: 28
LAUNDRY: UNABLE TO COMPLETE
ADLS_ACUITY_SCORE: 28
ADLS_ACUITY_SCORE: 28

## 2023-10-16 NOTE — PROVIDER NOTIFICATION
10/16/23 1520   Individualization/Patient Specific Goals   Patient Personal Strengths expressive of emotions;expressive of needs;family/social support   Patient Vulnerabilities lacks insight into illness;poor impulse control   Anxieties, Fears or Concerns Pt often responds to internal stimuli and can be seen talking to himself. Pt often gestures with his hand, as well. Pt has a history of violence and aggressive behavior and remains on 2:1 SIO.   Individualized Care Needs Pt was recently admitted to the unit and requires further symptom stabilization and medication management.   Interprofessional Rounds   Summary Pt is on civil commitment and Bolanos - provisional discharge has been revoked. Pt is psychotic and responds to internal stimuli, frequently is seen talking to himself, and gesturing to the air. Pt has a history of violent and aggressive behavior and is on a 2:1 SIO. Pt has been taking his medications on the unit and is highly focused on food. Pt had visit with his dad yesterday that went well. Pt is not social on the unit with peers.   Participants CTC;psychiatrist;psychotherapy;OT;nursing   Behavioral Team Discussion   Participants Nic Morgan MD; Jean-Paul Underwood RN; Becca Schwab [facility]; Sunshine Burns, OTR/L; Jerome Marquez, LMFT, ATR-BC   Progress Pt was recently admitted and still presents with significant psychotic symptomology. Pt requires further symptom stabilization and medication management.   Anticipated length of stay Pt requires further stabilization.   Continued Stay Criteria/Rationale N/A   Medical/Physical No issues noted   Precautions Suicidal ideation and assault precautions   Plan Multidisciplinary team evaluation, medication management, care coordination   Rationale for change in precautions or plan N/A   Safety Plan Per unit protocol   Anticipated Discharge Disposition home with family;IRTS     PRECAUTIONS AND SAFETY    Behavioral Orders   Procedures    Assault precautions    Code 1  - Restrict to Unit    Routine Programming     As clinically indicated    Self Injury Precaution    Status 15     Every 15 minutes.    Status Individual Observation     Patient SIO status reviewed with team/RN.  Please also refer to RN/team documentation for add'l detail.  2:1 SIO.  -SIO staff to monitor following which have contributed to patient being on SIO:  Assaultivness, self harm  -Possible interventions SIO staff could use to support patient's treatment progress:  Distraction, offering prn meds, suggesting to use coping skills  -When following observed, team will review discontinuation of SIO:  Better insight and judgment, improvement of psychotic symptoms.     Order Specific Question:   CONTINUOUS 24 hours / day     Answer:   Other     Order Specific Question:   Specify distance     Answer:   10 feet     Order Specific Question:   Indications for SIO     Answer:   Assault risk     Order Specific Question:   Indications for SIO     Answer:   Severe intrusiveness       Safety  Safety WDL: WDL  Patient Location: [facility]  Observed Behavior: pacing  Safety Measures: 1:1 observation maintained  Diversional Activity: television  De-Escalation Techniques: 1:1 observation initiated  Suicidality: SIO (Status Individual Observation)  (NOTE - order will specify distance  Assault: status continuous sight

## 2023-10-16 NOTE — PLAN OF CARE
Problem: Psychotic Signs/Symptoms  Goal: Improved Sleep (Psychotic Signs/Symptoms)  Outcome: Progressing         Pt spent the first half of the shift in the lounge watching movies while wearing headphones. Pt noted to be rapping, laughing and talking to self while in the lounge, appears distracted and responding to internal stimuli frequently, occasionally making hand gestures to himself as well. Pt not noted to be social with peers, affect is restricted and flat, appears tense at times but has been cooperative this shift. Pt continues to appear very food focused and whenever writer approaches him to check in or ask how he is doing he requests a snack. Pt went to bed shortly after dinner, had to be woken up for scheduled HS medications but was compliant and then returned to sleep. Pt denies pain, no noted or reported medication SE. Pt has not engaged in any SI/SIB/HI behaviors. Pt continues on SIO 2:1 staffing for assaultive behaviors and SIB. Will continue to monitor and support plan of care.

## 2023-10-16 NOTE — PROGRESS NOTES
"[facility] [place]   Psychiatric Progress Note        Interim History:     The patient's care was discussed with the treatment team during the daily team meeting and/or staff's chart notes were reviewed.  Staff report patient appeared to be asleep for about 4.5 hours last night. Was compliant with meds and care. Was reported to be frequently attending to internal stimuli, see RN's note below:    \"Pt spent the first half of the shift in the lounge watching movies while wearing headphones. Pt noted to be rapping, laughing and talking to self while in the lounge, appears distracted and responding to internal stimuli frequently, occasionally making hand gestures to himself as well. Pt not noted to be social with peers, affect is restricted and flat, appears tense at times but has been cooperative this shift. Pt continues to appear very food focused and whenever writer approaches him to check in or ask how he is doing he requests a snack.\"    Met with patient: patient was seen in his room with 2 SIO staff sitting outside of his room. He was pretty pleasant, said that he had been waiting to meet with me. When asked about reasons for coming in, said that he felt that he was not doing well. With more questions admitted to not taking Haldol for at least 1-2 months and reported that he had been doing much better on Haldol. I asked if he felt safe at this hospital, Moe said that he didn't, but promised to notify us if he was experiencing Auditory hallucinations or Visual hallucinations and promised to be fully compliant with his meds. He had no more questions or concerns.          Medications:      fluticasone  1 spray Both Nostrils BID    fluticasone-vilanterol  1 puff Inhalation Daily    risperiDONE  1 mg Oral BID    Or    OLANZapine  5 mg Intramuscular BID    polyethylene glycol  17 g Oral Daily          Allergies:     Allergies   Allergen Reactions    No Known Drug Allergy           Labs: " "  No results found for this or any previous visit (from the past 24 hour(s)).       Psychiatric Examination:     /73 (BP Location: Right arm, Patient Position: Sitting)   Pulse 97   Temp 97.1  F (36.2  C) (Temporal)   Resp 16   Ht 1.981 m (6' 6\")   Wt 127.3 kg (280 lb 9.6 oz)   SpO2 100%   BMI 32.43 kg/m    Weight is 280 lbs 9.6 oz  Body mass index is 32.43 kg/m .    Orthostatic Vitals         Most Recent      Sitting Orthostatic /84 10/15 0852    Sitting Orthostatic Pulse (bpm) 103 10/15 0852    Standing Orthostatic /84 10/16 0844    Standing Orthostatic Pulse (bpm) 109 10/16 0844          Appearance: awake, alert and dressed in hospital scrubs  Attitude:  cooperative  Eye Contact:  good  Mood:   \"I am OK\"  Affect:  mood congruent, intensity is blunted, and appears to be puzzled  Speech:  decreased prosody  Psychomotor Behavior:  no evidence of tardive dyskinesia, dystonia, or tics  Throught Process:  linear  Associations:  loosening of associations present  Thought Content:  patient appears to be responding to internal stimuli  Insight:  partial  Judgement:  limited  Oriented to:  time, person, and place  Attention Span and Concentration:  limited  Recent and Remote Memory:  fair    Clinical Global Impressions  First: 7/4 10/16/2023      Most recent:            Precautions:     Behavioral Orders   Procedures    Assault precautions    Code 1 - Restrict to Unit    Routine Programming     As clinically indicated    Self Injury Precaution    Status 15     Every 15 minutes.    Status Individual Observation     Patient SIO status reviewed with team/RN.  Please also refer to RN/team documentation for add'l detail.  2:1 SIO.  -SIO staff to monitor following which have contributed to patient being on SIO:  Assaultivness, self harm  -Possible interventions SIO staff could use to support patient's treatment progress:  Distraction, offering prn meds, suggesting to use coping skills  -When following " observed, team will review discontinuation of SIO:  Better insight and judgment, improvement of psychotic symptoms.     Order Specific Question:   CONTINUOUS 24 hours / day     Answer:   Other     Order Specific Question:   Specify distance     Answer:   10 feet     Order Specific Question:   Indications for SIO     Answer:   Assault risk     Order Specific Question:   Indications for SIO     Answer:   Severe intrusiveness          DIagnoses:     Schizophrenia, schizoaffective, chronic with acute exacerbation          Plan:     Will discontinue Risperdal and restart on haldol, first, oral, then, will add Haldol Decanoate. Will continue to provide support and structure.      Total time spent was 38 minutes. Over 50% of times was spent counseling and coordination of care regarding coping skills, medication and discharge planning.

## 2023-10-16 NOTE — PLAN OF CARE
Pt appeared to sleep for a total of 4.5 hours overnight. No PRN medications were administered, and no concerns were noted. Pt's sleep was intermittent tonight; he was awake several times. During those times, he was observed listening to music in his room while singing/talking to himself. He also came out to the dining room to request and eat snacks. Pt appeared in an overall calm, pleasant mood. No aggression noted. Pt remains on 2:1 SIO for assault risk/history of aggression.     Problem: Sleep Disturbance  Goal: Adequate Sleep/Rest  Outcome: Progressing

## 2023-10-16 NOTE — CARE PLAN
NONATTENDANCE OCCUPATIONAL THERAPY NOTE     10/16/23 1400   General Information   Has Not Attended OT as of: 10/16/23     Pt has not attended scheduled occupational therapy sessions. Encourage attendance and participation as appropriate. OT staff will explain the purpose of including them in their treatment plan and offer options for meeting their needs.

## 2023-10-16 NOTE — PLAN OF CARE
Goal Outcome Evaluation:    Plan of Care Reviewed With: patient    Pt spent more than 50% of the shift in common areas of the unit. Pt was medication compliant. Ate 100% of his breakfast and lunch. Pt observed talking to self almost incessantly this morning with very animated facial expression. No threatening behavior observed. Pt showered x 2 this shift. Pt is superficial and guarded in check in. Pt was oriented x 4. Pt was cooperative with SIO staff. Pt not observed attending any programming this shift. Pt not observed socializing with peers. Pt listening to headphones most of the day.

## 2023-10-16 NOTE — PLAN OF CARE
LOV 11-8-2019  [facility] 11-18-20  No future appt   Team Note Due:  Monday    Assessment/Intervention/Current Symtoms and Care Coordination:  Chart review and met with team, discussed pt progress, symptomology, and response to treatment.  Discussed the discharge plan and any potential impediments to discharge.     Discharge Plan or Goal:  Current plan is to stabilize symptoms and develop safe disposition plan. Following hospitalization, plan is to either return home with continued home health support and outpatient support or discharge to IRTS.     Barriers to Discharge:  Pt continues to present with significant psychotic symptoms. Pt needs further symptom stabilization and medication management support.     Referral Status:  Once pt stabilizes further, referrals will be assessed for continued support following hospitalization. Due to current symptoms, pt is unable to participate in discharge planning at this time.     Legal Status:  MI Commitment and Bolanos Order (PD revoked)   County: [facility]  File Number: [id number]  Start and expiration date of commitment: Start date is 9/28/2023   Bolanos Ordered Medications: Zyprexa, Haldol, Risperdal, Invega, Clozaril, Thorazine    Contacts:  Medication Management/Psychiatry:  Name/Clinic: Dr. Felipe Zamorano [facility]   Phone: [phone]    [facility] on 10/6/2023 Nicola Carias RN      /ACT Team:  Name: Angela   Phone: [phone]       Upcoming Meetings and Dates/Important Information and next steps:  None

## 2023-10-17 PROCEDURE — G0177 OPPS/PHP; TRAIN & EDUC SERV: HCPCS

## 2023-10-17 PROCEDURE — 124N000002 HC R&B MH UMMC

## 2023-10-17 PROCEDURE — 99232 SBSQ HOSP IP/OBS MODERATE 35: CPT | Performed by: PSYCHIATRY & NEUROLOGY

## 2023-10-17 PROCEDURE — 250N000013 HC RX MED GY IP 250 OP 250 PS 637: Performed by: PSYCHIATRY & NEUROLOGY

## 2023-10-17 RX ADMIN — FLUTICASONE FUROATE AND VILANTEROL TRIFENATATE 1 PUFF: 200; 25 POWDER RESPIRATORY (INHALATION) at 19:49

## 2023-10-17 RX ADMIN — HALOPERIDOL 5 MG: 5 TABLET ORAL at 10:40

## 2023-10-17 RX ADMIN — POLYETHYLENE GLYCOL 3350 17 G: 17 POWDER, FOR SOLUTION ORAL at 10:39

## 2023-10-17 RX ADMIN — FLUTICASONE PROPIONATE 1 SPRAY: 50 SPRAY, METERED NASAL at 19:44

## 2023-10-17 RX ADMIN — HALOPERIDOL 5 MG: 5 TABLET ORAL at 19:44

## 2023-10-17 ASSESSMENT — ACTIVITIES OF DAILY LIVING (ADL)
ADLS_ACUITY_SCORE: 28

## 2023-10-17 NOTE — PROGRESS NOTES
"[facility], [place]   Psychiatric Progress Note        Interim History:     The patient's care was discussed with the treatment team during the daily team meeting and/or staff's chart notes were reviewed.  Staff report patient again appeared to be asleep for about 4.5 hours last night. Was compliant with meds and care. Was reported to still have periods of suspiciousness, appeared to be distrustful, see RN's note below:    \"Speech is clear and coherent, thought process is clear with periods of disorganization, poor concentration and appears distrustful and suspicious at times, frequently questions things. Pt reports feeling anxious about not going outside and wants to get out of the hospital. Denies depression, denies SI/HI/SIB and hallucinations, denies pain.\"    Met with patient: patient was seen in his room with 1 SIO staff sitting outside of his room. He was pretty pleasant, said that he would willingly cooperate with meds and hospital rules to be discharged from this facility as soon as possible. Said that it was his mistake to ask outpatient psychiatrist to lower his Haldol Decanoate dose from 100 to 75 mg. Said that he would be willing to take higher dose if we decide he needs one. Denied Suicidal ideation, Homicidal thoughts, Auditory hallucinations, Visual hallucinations. Said that he participated in one group yesterday. Agreed that he was safe enough to decrease SIO to 1:1. Denied med side effects. He had no more questions or concerns.          Medications:      fluticasone  1 spray Both Nostrils BID    fluticasone-vilanterol  1 puff Inhalation Daily    haloperidol  5 mg Oral BID    Or    haloperidol lactate  5 mg Intramuscular BID    polyethylene glycol  17 g Oral Daily          Allergies:     Allergies   Allergen Reactions    No Known Drug Allergy           Labs:   No results found for this or any previous visit (from the past 24 hour(s)).       Psychiatric Examination: " "    /73 (BP Location: Right arm, Patient Position: Sitting)   Pulse 97   Temp 97.1  F (36.2  C) (Temporal)   Resp 16   Ht 1.981 m (6' 6\")   Wt 127.3 kg (280 lb 9.6 oz)   SpO2 100%   BMI 32.43 kg/m    Weight is 280 lbs 9.6 oz  Body mass index is 32.43 kg/m .    Orthostatic Vitals         Most Recent      Standing Orthostatic /84 10/16 0844    Standing Orthostatic Pulse (bpm) 109 10/16 0844           Appearance: awake, alert and dressed in hospital scrubs  Attitude:  cooperative  Eye Contact:  fair  Mood:   \"I am OK\"  Affect:  mood congruent, intensity is blunted,  Speech:  decreased prosody, volume  Psychomotor Behavior:  no evidence of tardive dyskinesia, dystonia, or tics  Throught Process:  linear  Associations:  loosening of associations present  Thought Content:  patient appears to be responding to internal stimuli off and on, but not during our meeting;  Insight:  partial  Judgement:  limited  Oriented to:  time, person, and place  Attention Span and Concentration:  limited  Recent and Remote Memory:  fair    Clinical Global Impressions  First: 7/4 10/16/2023      Most recent:            Precautions:     Behavioral Orders   Procedures    Assault precautions    Code 1 - Restrict to Unit    Routine Programming     As clinically indicated    Self Injury Precaution    Status 15     Every 15 minutes.    Status Individual Observation     Patient SIO status reviewed with team/RN.  Please also refer to RN/team documentation for add'l detail.  1:1 SIO.  -SIO staff to monitor following which have contributed to patient being on SIO:  Assaultivness, self harm  -Possible interventions SIO staff could use to support patient's treatment progress:  Distraction, offering prn meds, suggesting to use coping skills  -When following observed, team will review discontinuation of SIO:  Better insight and judgment, improvement of psychotic symptoms.     Order Specific Question:   CONTINUOUS 24 hours / day     " Answer:   Other     Order Specific Question:   Specify distance     Answer:   10 feet     Order Specific Question:   Indications for SIO     Answer:   Assault risk     Order Specific Question:   Indications for SIO     Answer:   Severe intrusiveness          DIagnoses:     Schizophrenia, schizoaffective, chronic with acute exacerbation          Plan:     Was restarted on oral haldol yesterday. Will add Haldol Decanoate in 2-3 days. No medication changes today 10/17/2023. Will continue to provide support and structure. Will decrease SIO to 1:1 with plan to stop it completely shortly if patient is cooperative.    Total time spent was 35 minutes. Over 50% of times was spent counseling and coordination of care regarding coping skills, medication and discharge planning.

## 2023-10-17 NOTE — PLAN OF CARE
Problem: Psychotic Signs/Symptoms  Goal: Optimal Cognitive Function (Psychotic Signs/Symptoms)  Intervention: Support and Promote Cognitive Ability  Recent Flowsheet Documentation  Taken 10/17/2023 1100 by Juani Barcenas RN  Trust Relationship/Rapport:   care explained   choices provided   emotional support provided   Goal Outcome Evaluation:       Pt switched from a 2:1 to 1:1 @30081        Pt. Continues on a 1:1 SIO for assaultivness, and self harm.    Pt presents with a flat guarded affect, calm and cooperative, pleasant on approach spent long periods resting in bed, up for breakfast @05768. Refused morning group avoids social contact. Periods of sitting quietly in the lounge listening to music, restless at times waving his arms while watching TV.  Speech is clear and coherent, thought process is clear with periods of disorganization, poor concentration and appears distrustful and suspicious at times, frequently questions things. Pt reports feeling anxious about not going outside and wants to get out of the hospital. Denies depression, denies SI/HI/SIB and hallucinations, denies pain. Medication compliance and contracted for safety. Pt attended and participated in afternoon group, engaged appropriately with staff,  No assaultivness or agitated behaviors all shift.

## 2023-10-17 NOTE — PLAN OF CARE
Problem: Adult Behavioral Health Plan of Care  Goal: Plan of Care Review  10/17/2023 0702 by Viral Rajput, RN  Outcome: Progressing  10/16/2023 2146 by Viral Rajput, RN  Outcome: Progressing  Flowsheets (Taken 10/16/2023 1700)  Patient Agreement with Plan of Care: agrees     Problem: Sleep Disturbance  Goal: Adequate Sleep/Rest  Outcome: Progressing     Problem: Psychotic Signs/Symptoms  Goal: Improved Behavioral Control (Psychotic Signs/Symptoms)  Outcome: Progressing   Goal Outcome Evaluation:    Plan of Care Reviewed With: patient        Pt was awake, up and about until at 02:00 am when he fell asleep. During the period when pt was awake, he complained of hunger and the charge nurse went down stairs and brought up two turkey sand witches. Pt ate both of them before going to bed. This writer offered PRN Trazodone to help with sleep, but pt declined stated that he had some good sleep last evening after dinner. He slept for 4.5 hours. No psychotic signs and symptoms noted. Pt is on a 2:1 SIO 10 Ft for assault precautions. No safety concerns noted. Will continue to monitor.

## 2023-10-17 NOTE — CARE PLAN
Occupational Therapy Group Note:     10/17/23 1720   Group Therapy Session   Group Attendance attended group session   Time Session Began 1400   Time Session Ended 1445   Total Time (minutes) 30 (no charge)   Total # Attendees 5   Group Type psychoeducation   Group Topic Covered structured socialization;coping skills/lifestyle management   Group Session Detail OT Topic Group: Gratitude Journaling   Patient Response/Contribution confronted peers appropriately;cooperative with task   Patient Participation Detail Patient engaged in a topic group focusing on the importance of gratitude. Patient was encouraged to identify everyday people and/or things that they are most grateful for in order to promote self-worth, build happiness, promote social relationships, and maximize overall health and wellbeing. Patient entered group with SIO present. Patient engaged in partial aspects of journaling group; seemed distracted at times. Patient did not always fully complete the worksheets utilized in group; however, patient seemed to be more willing to verbally contribute. Patient attempted to recruit quieter peers in group to share their ideas with group. Patient chose to leave group early without sharing complete journal entry with group. Affect: flat. Mood: calm, cooperative.

## 2023-10-17 NOTE — PROGRESS NOTES
"Patient has been in room most of the evening. He has been sleeping/resting in room. Writer introduced himself to patient at the start of shift. Pt asked writer if he is from [place]. Writer agreed about being from [place]. Pt stated \"oh really\". He looked suspicious and said \"never mind but are you going to be my nurse the whole evening\". Pt then said, \"nice to meet you man\". He has been in his room every since. Pt woke up around 7:30 to eat dinner and take his night time medication. He is med compliant, guarded but cooperative on approach. Pt denies most mental health symptoms including, depression, delusions, and SI/SIB/HI. He also denies hearing voices. Pt appears to be responding to internal stimuli, and does state feeling anxious and paranoid this evening. Pt is kempt took a shower towards the end of the night. No acute behavioral concerns this shift. No medication side effects stated or observed. No medical concerns noted this shift.     /72 (BP Location: Right arm)   Pulse 90   Temp 97.8  F (36.6  C) (Temporal)   Resp 16   Ht 1.981 m (6' 6\")   Wt 127.3 kg (280 lb 9.6 oz)   SpO2 98%   BMI 32.43 kg/m       "

## 2023-10-17 NOTE — CARE PLAN
Occupational Therapy Group Note:     10/17/23 1712   Group Therapy Session   Group Attendance attended group session   Time Session Began 1300   Time Session Ended 1345   Total Time (minutes) 45   Total # Attendees 7   Group Type recreation   Group Topic Covered leisure exploration/use of leisure time;structured socialization   Group Session Detail OT Leisure Group   Patient Response/Contribution confronted peers appropriately;cooperative with task;listened actively   Patient Participation Detail Patient engaged in group leisure activity with the focus being: building interpersonal skills, encouraging team collaboration, and promoting overall prosocial engagement and interaction. Patient entered group with SIO present. Patient reported during check-in that a leisure activity he enjoys participating in is Julio Cesar. Patient appeared willing to learn a new game and work together with peers in a team dynamic. Patient occasionally spoke over others; however, was easily redirectable. Patient provided generally appropriate responses to game prompts; seemed to enjoy sharing ideas with entire group. Patient appeared attentive and engaged in activity for full duration. Patient made a request to write on the whiteboard while group was in session; writer declined patient's request and patient was receptive of this. Affect: blunted. Mood: calm, cooperative.

## 2023-10-17 NOTE — PLAN OF CARE
Problem: Adult Behavioral Health Plan of Care  Goal: Plan of Care Review  Outcome: Progressing  Flowsheets (Taken 10/16/2023 1700)  Patient Agreement with Plan of Care: agrees     Problem: Psychotic Signs/Symptoms  Goal: Improved Behavioral Control (Psychotic Signs/Symptoms)  Outcome: Progressing  Intervention: Manage Behavior  Recent Flowsheet Documentation  Taken 10/16/2023 1700 by Viral Rajput RN  De-Escalation Techniques:   1:1 observation initiated   appropriate behavior reinforced   diversional activity encouraged   Goal Outcome Evaluation:    Plan of Care Reviewed With: patient        Pt is isolative and withdrawn to room the majority of the shift but comes out for meals. He was out for dinner at 17:30, but dinner wasn't available yet so he decided to sit up to wait while watching TV. However, he remained socially isolative. He endorsed depression and rated at 5/10, but denied pain, anxiety, SI/SIB/HI/AVH, and contracted for safety. Pt is pleasant with a flat and blunted affect. Mood presents as calm and cooperative. Judgment and insight not appropriate to situation. He continues to be on a 2:1 SIO 10 Ft for assault precautions. Pt is medication compliant. No concerns noted or verbalized. Will continue with same plan of care.

## 2023-10-17 NOTE — PLAN OF CARE
Team Note Due:  Monday    Assessment/Intervention/Current Symtoms and Care Coordination:  Chart review and met with team, discussed pt progress, symptomology, and response to treatment.  Discussed the discharge plan and any potential impediments to discharge.     Tasks Completed:   - Left message for pt's .     Discharge Plan or Goal:  Current plan is to stabilize symptoms and develop safe disposition plan. Following hospitalization, plan is to either return home with continued home health support and outpatient support or discharge to IR.     Barriers to Discharge:  Pt continues to present with significant psychotic symptoms. Pt needs further symptom stabilization and medication management support.     Referral Status:  Once pt stabilizes further, referrals will be assessed for continued support following hospitalization. Due to current symptoms, pt is unable to participate in discharge planning at this time.     Legal Status:  MI Commitment and [place]anos Order (PD revoked)   County: [facility]  File Number: [id number]  Start and expiration date of commitment: Start date is 9/28/2023   Bolanos Ordered Medications: Zyprexa, Haldol, Risperdal, Invega, Clozaril, Thorazine    Contacts:  Medication Management/Psychiatry:  Name/Clinic: Dr. Feilpe Zamorano [facility]   Phone: [phone]    [facility] on 10/6/2023 Nicola Carias RN      /ACT Team:  Name: Maryam Miranda  Phone: [phone]       Upcoming Meetings and Dates/Important Information and next steps:  None

## 2023-10-17 NOTE — DISCHARGE INSTRUCTIONS
Behavioral Discharge Planning and Instructions    Summary: You were admitted on 10/13/2023  due to Psychotic Symptomology.  You were treated by Nic Morgan MD and discharged on 2023 from [facility] to [facility] located at [address] [place] [id number].    Discharge Transportation      Address: [address] [place] [address]  Date/ Time: 23 @ 10:30 AM    Drop off  Address: [address] [address]    Insurance: [facility]  Phone: [phone]  Transportation company: [employer]  Phone: [phone]  Confirmation #: [id number]  Call on arrival:[address] Unit #: [phone]     Main Diagnosis: Schizophrenia, schizoaffective, chronic with acute exacerbation     Commitment: You were dually [place] [facility] on 2023 and were discharged on a Provisional Discharge Agreement on 10/2/2023. Your provisional discharge was revoked on 10/11/2023 due to not following the terms of the agreement. You were admitted to [facility] and are now being discharged on another Provisional Discharge Agreement which shall remain in effect for the duration of the Commitment which expires on 3/28/2024, unless further extended by court order.    Health Care Follow-up:     Psychiatry Appointment:  at 2:00pm  Provider: Dr. Felipe Zamorano [facility]   [address], [place] [address]  Phone: [phone]  Fax: [phone]    Information will be faxed to your outpatient providers to ensure a healthy continuity of care for you.     Attend all scheduled appointments with your outpatient providers. Call at least 24 hours in advance if you need to reschedule an appointment to ensure continued access to your outpatient providers.     Major Treatments, Procedures and Findings:  You were provided with: a psychiatric assessment, assessed for medical  "stability, medication evaluation and/or management, group therapy, family therapy, individual therapy, milieu management, and medical interventions    Symptoms to Report: feeling more aggressive, increased confusion, losing more sleep, mood getting worse, or thoughts of suicide    Early warning signs can include: increased depression or anxiety sleep disturbances increased thoughts or behaviors of suicide or self-harm     Safety and Wellness:  Take all medicines as directed. Make no changes unless your doctor suggests them. Follow treatment recommendations. Refrain from alcohol and non-prescribed drugs. Ask your support system to help you reduce your access to items that could harm yourself or others. Items could include:    Firearms  Medicines (both prescribed and over-the-counter)  Knives and other sharp objects  Ropes and like materials  Car keys  If there is a concern for safety, call 911. If there is a concern for safety, call 911.    Resources:   Crisis Intervention: 903.371.7001 or 925-277-9285 (TTY: 529.423.4078).  Call anytime for help.  National [place] on Mental Illness (www.mn.juana.org): 964.540.7488 or 265-226-6609.  National Suicide Prevention Line (www.mentalhealthmn.org): 509-583-KFOY (7184)  Self- Management and Recovery Training., [employer]-- Toll free: 604.132.2857  [employer]  [facility] Crisis Response 516-379-9344  Crisis text line: Text \"[place]\" to 795667. Free, confidential, 24/7.    [facility] ([place] on Mental Illness) improves the lives of children and adults with mental illnesses and their families by providing free classes on mental illnesses and support groups for adults with mental illnesses, parents and family members. For more information: Phone: 588.619.6915 Toll free: 3-278-NBWK-HELPS Website: www.namihelps.orghttp://www.namihelps.org/    General Medication Instructions:   See your medication sheet(s) for instructions.   Take all medicines as directed.  Make " no changes unless your doctor suggests them.   Go to all your doctor visits.  Be sure to have all your required lab tests. This way, your medicines can be refilled on time.  Do not use any drugs not prescribed by your doctor.  Avoid alcohol.    Advance Directives:   Scanned document on file with [employer]? No scanned doc  Is document scanned? Pt states no documents  Honoring Choices Your Rights Handout: Minor - N/A  Was more information offered? Pt declined    The Treatment team has appreciated the opportunity to work with you. If you have any questions or concerns about your recent admission, you can contact the unit which can receive your call 24 hours a day, 7 days a week. They will be able to get in touch with a Provider if needed. The unit number is [phone].

## 2023-10-18 PROCEDURE — 99232 SBSQ HOSP IP/OBS MODERATE 35: CPT | Performed by: PSYCHIATRY & NEUROLOGY

## 2023-10-18 PROCEDURE — 124N000002 HC R&B MH UMMC

## 2023-10-18 PROCEDURE — 250N000013 HC RX MED GY IP 250 OP 250 PS 637: Performed by: PSYCHIATRY & NEUROLOGY

## 2023-10-18 PROCEDURE — G0177 OPPS/PHP; TRAIN & EDUC SERV: HCPCS

## 2023-10-18 PROCEDURE — 250N000011 HC RX IP 250 OP 636: Performed by: PSYCHIATRY & NEUROLOGY

## 2023-10-18 PROCEDURE — H2032 ACTIVITY THERAPY, PER 15 MIN: HCPCS

## 2023-10-18 RX ORDER — POLYETHYLENE GLYCOL 3350 17 G
2 POWDER IN PACKET (EA) ORAL
Status: DISCONTINUED | OUTPATIENT
Start: 2023-10-18 | End: 2023-11-07 | Stop reason: HOSPADM

## 2023-10-18 RX ORDER — HALOPERIDOL DECANOATE 100 MG/ML
100 INJECTION INTRAMUSCULAR
Status: DISCONTINUED | OUTPATIENT
Start: 2023-10-18 | End: 2023-11-07 | Stop reason: HOSPADM

## 2023-10-18 RX ADMIN — HALOPERIDOL 5 MG: 5 TABLET ORAL at 20:31

## 2023-10-18 RX ADMIN — FLUTICASONE PROPIONATE 1 SPRAY: 50 SPRAY, METERED NASAL at 10:53

## 2023-10-18 RX ADMIN — FLUTICASONE FUROATE AND VILANTEROL TRIFENATATE 1 PUFF: 200; 25 POWDER RESPIRATORY (INHALATION) at 10:53

## 2023-10-18 RX ADMIN — FLUTICASONE PROPIONATE 1 SPRAY: 50 SPRAY, METERED NASAL at 20:34

## 2023-10-18 RX ADMIN — POLYETHYLENE GLYCOL 3350 17 G: 17 POWDER, FOR SOLUTION ORAL at 10:51

## 2023-10-18 RX ADMIN — HALOPERIDOL 5 MG: 5 TABLET ORAL at 10:52

## 2023-10-18 RX ADMIN — HALOPERIDOL DECANOATE 100 MG: 100 INJECTION INTRAMUSCULAR at 17:32

## 2023-10-18 ASSESSMENT — ACTIVITIES OF DAILY LIVING (ADL)
DRESS: SCRUBS (BEHAVIORAL HEALTH)
ADLS_ACUITY_SCORE: 28
HYGIENE/GROOMING: INDEPENDENT
LAUNDRY: WITH SUPERVISION
ADLS_ACUITY_SCORE: 28
ORAL_HYGIENE: INDEPENDENT
ADLS_ACUITY_SCORE: 28

## 2023-10-18 NOTE — PLAN OF CARE
BEH IP Unit Acuity Rating Score (UARS)  Patient is given one point for every criteria they meet.    CRITERIA SCORING   On a 72 hour hold, court hold, committed, stay of commitment, or revocation 1/1    Patient LOS on BEH unit exceeds 20 days 0/1  LOS: 5   Patient under guardianship, 55+, otherwise medically complex, or under age 11 0/1   Suicide ideation without relief of precipitating factors 0/1   Current plan for suicide 0/1   Current plan for homicide 0/1   Imminent risk or actual attempt to seriously harm another without relief of factors precipitating the attempt 0/1   Severe dysfunction in daily living (ex: complete neglect for self care, extreme disruption in vegetative function, extreme deterioration in social interactions) 0/1   Recent (last 7 days) or current physical aggression in the ED or on unit 0/1   Restraints or seclusion episode in past 72 hours 0/1   Recent (last 7 days) or current verbal aggression, agitation, yelling, etc., while in the ED or unit 1/1   Active psychosis 1/1   Need for constant or near constant redirection (from leaving, from others, etc).  0/1   Intrusive or disruptive behaviors 0/1   TOTAL 3

## 2023-10-18 NOTE — PLAN OF CARE
Problem: Plan of Care - These are the overarching goals to be used throughout the patient stay.    Goal: Optimal Comfort and Wellbeing  Intervention: Provide Person-Centered Care  Recent Flowsheet Documentation  Taken 10/18/2023 1151 by Juani Barcenas RN  Trust Relationship/Rapport:   care explained   choices provided   emotional support provided   Goal Outcome Evaluation:             Pt was taken off his SIO @55666     Early morning pt. Was laying in bed starring at the ceiling talking and laughing to himself, appears to be responding to internal stimulus, pt. denies auditory and visual hallucinations.  refused breakfast, visible in the lounge @13006 Pt. Calm and cooperative, pleasant on approach, presents with a flat blunted affect. Minimal engagement with peers, attended and participated in group. Pt. Has Periods of restlessness and poor concentration, abruptly walks away while communicating, often paces the hallway listening to music, periods of sitting quietly in the lounge, speech is clear and coherent, thought process is linear with periods of disorganization. Pt. Reports feeling good and wanting to go home, denies anxiety and depression, denies pain and all other psych symptoms. Medication compliance and contracts for safety.

## 2023-10-18 NOTE — CARE PLAN
10/18/23 1246   Group Therapy Session   Group Attendance attended group session   Time Session Began 1115   Time Session Ended 1200   Total Time (minutes) 45   Total # Attendees 6   Group Type life skill;task skill;other (see comments)  (OT)   Group Topic Covered balanced lifestyle;coping skills/lifestyle management;leisure exploration/use of leisure time;structured socialization   Group Session Detail OT Clinic: Activity group for creative expression, promoting autonomy, building self worth, coping with stress and symptoms, and an opportunity to exercise cognitive skills (I.e. initiation, planning, organizing, sequencing, sustained attention, follow through, and overall self awareness).   Patient Response/Contribution able to recall/repeat info presented;cooperative with task;discussed personal experience with topic   Patient Participation Detail Pt initiated coming to group on his own. He was pleasant and introduced self after staff did so. He asked if he could just sit and be in group as it was earlier in the morning and he was still tired. He then easily engaged in music selection and talking to others about various artists. He wrote titles on the board for others and his own choices. He also shared some of his own interests in leisure activities when staff checked in. Will continue to assess.

## 2023-10-18 NOTE — PLAN OF CARE
Team Note Due:  Monday    Assessment/Intervention/Current Symtoms and Care Coordination:  Chart review and met with team, discussed pt progress, symptomology, and response to treatment.  Discussed the discharge plan and any potential impediments to discharge.     Tasks Completed:   - Left message with pt's mother to provide update and discuss discharge plans.  - Spoke to pt's dad to provide update and discuss discharge plans. He reported that he met with pt on Sunday and noticed improvement. CTC provided update regarding pt's medication compliance and being calm and cooperative on the unit. Pt's father shared that as long as pt is not displaying aggressive behavior, he is able to return to his mother's home. Pt's father shared that he will talk to pt's mother and have her call CTC to discuss discharge plans.    Discharge Plan or Goal:  Current plan is to stabilize symptoms and develop safe disposition plan. Following hospitalization, plan is to return home with continued home health support and outpatient medication management.     Barriers to Discharge:  Pt continues to respond to internal stimuli.     Referral Status:  Pt has psychiatrist in the community and is receiving home health services for support with injections and medication management. Follow-up psychiatry appointment has been scheduled - AVS updated.     Legal Status:  MI Commitment and Bolanos Order (PD revoked)   County: [facility]  File Number: [id number]  Start and expiration date of commitment: Start date is 9/28/2023   Bolanos Ordered Medications: Zyprexa, Haldol, Risperdal, Invega, Clozaril, Thorazine    Contacts:  Medication Management/Psychiatry:  Name/Clinic: Dr. Felipe Zamorano [facility]   Phone: [phone]    [facility] on 10/6/2023 Nicola Carias RN      /ACT Team:  Name: Maryam Miranda  Phone: [phone]      Family Contacts:   Name: Angeles Rosales - mother (BUTCH on file)  Phone: [phone]    Name: Jostin  Omar Doe - father (BUTCH on file)  Phone: [phone]     Upcoming Meetings and Dates/Important Information and next steps:  None
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
## 2023-10-18 NOTE — PLAN OF CARE
Care Coordination Note    Care Coordinator scheduled the following appointments:    Psychiatry Appointment: Thursday November 9th at 2:00pm  Provider: Dr. Felipe Zamorano [place] [id number], [place] [address]  Phone: [phone]  Fax: [phone]    Care Coordinator updated pt's AVS
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
## 2023-10-18 NOTE — PLAN OF CARE
Patient's was sleeping at this time. Flonase nasal spray and Breo- Ellipta inhaler was not administered. Thus, medication time administration was moved to 0800.

## 2023-10-19 PROCEDURE — 99232 SBSQ HOSP IP/OBS MODERATE 35: CPT | Performed by: PSYCHIATRY & NEUROLOGY

## 2023-10-19 PROCEDURE — 250N000013 HC RX MED GY IP 250 OP 250 PS 637: Performed by: PSYCHIATRY & NEUROLOGY

## 2023-10-19 PROCEDURE — G0177 OPPS/PHP; TRAIN & EDUC SERV: HCPCS

## 2023-10-19 PROCEDURE — 124N000002 HC R&B MH UMMC

## 2023-10-19 RX ADMIN — POLYETHYLENE GLYCOL 3350 17 G: 17 POWDER, FOR SOLUTION ORAL at 10:10

## 2023-10-19 RX ADMIN — FLUTICASONE PROPIONATE 1 SPRAY: 50 SPRAY, METERED NASAL at 20:21

## 2023-10-19 RX ADMIN — HALOPERIDOL 5 MG: 5 TABLET ORAL at 10:10

## 2023-10-19 RX ADMIN — FLUTICASONE FUROATE AND VILANTEROL TRIFENATATE 1 PUFF: 200; 25 POWDER RESPIRATORY (INHALATION) at 10:12

## 2023-10-19 RX ADMIN — HALOPERIDOL 5 MG: 5 TABLET ORAL at 20:21

## 2023-10-19 RX ADMIN — FLUTICASONE PROPIONATE 1 SPRAY: 50 SPRAY, METERED NASAL at 10:11

## 2023-10-19 ASSESSMENT — ACTIVITIES OF DAILY LIVING (ADL)
ADLS_ACUITY_SCORE: 28
ORAL_HYGIENE: INDEPENDENT
ADLS_ACUITY_SCORE: 28
HYGIENE/GROOMING: INDEPENDENT
DRESS: INDEPENDENT
ADLS_ACUITY_SCORE: 28
ADLS_ACUITY_SCORE: 28
HYGIENE/GROOMING: INDEPENDENT
ORAL_HYGIENE: INDEPENDENT
LAUNDRY: WITH SUPERVISION
ADLS_ACUITY_SCORE: 28
DRESS: SCRUBS (BEHAVIORAL HEALTH);INDEPENDENT
ADLS_ACUITY_SCORE: 28
ADLS_ACUITY_SCORE: 28
LAUNDRY: WITH SUPERVISION
ADLS_ACUITY_SCORE: 28
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
## 2023-10-19 NOTE — PLAN OF CARE
BEH IP Unit Acuity Rating Score (UARS)  Patient is given one point for every criteria they meet.    CRITERIA SCORING   On a 72 hour hold, court hold, committed, stay of commitment, or revocation 1/1    Patient LOS on BEH unit exceeds 20 days 0/1  LOS: 6   Patient under guardianship, 55+, otherwise medically complex, or under age 11 0/1   Suicide ideation without relief of precipitating factors 0/1   Current plan for suicide 0/1   Current plan for homicide 0/1   Imminent risk or actual attempt to seriously harm another without relief of factors precipitating the attempt 0/1   Severe dysfunction in daily living (ex: complete neglect for self care, extreme disruption in vegetative function, extreme deterioration in social interactions) 0/1   Recent (last 7 days) or current physical aggression in the ED or on unit 0/1   Restraints or seclusion episode in past 72 hours 0/1   Recent (last 7 days) or current verbal aggression, agitation, yelling, etc., while in the ED or unit 1/1   Active psychosis 1/1   Need for constant or near constant redirection (from leaving, from others, etc).  0/1   Intrusive or disruptive behaviors 0/1   TOTAL 3
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
## 2023-10-19 NOTE — PLAN OF CARE
"Goal Outcome Evaluation:    Patient presents with a flat affect. Reports mood as \"Okay.\" Is guarded, suspicious, and mistrustful; yet is calm and cooperative with no aggression noted. Thought content presents as impoverished. Thought process presents as relevant. Speech presents as paucity, clear, and coherent. Eye contact is fair. Patient denies depression, anxiety, suicidal ideation, SIB, homicidal ideation, and auditory/visual hallucinations. Patient appears to be responding to internal stimuli this shift; AEB, staring into space and talking with imaginary person or persons. No medical issues noted. Vital signs stable. Medication compliant. Patient showered this shift. Ate evening meal. Patient was out in the milieu this shift watching T.V. and movies. No interaction with staff and peers noted.    /80 (BP Location: Left arm, Patient Position: Sitting, Cuff Size: Adult Large)   Pulse 103   Temp 98.6  F (37  C) (Oral)   Resp 16   Ht 1.981 m (6' 6\")   Wt 127.3 kg (280 lb 9.6 oz)   SpO2 100%   BMI 32.43 kg/m                         "

## 2023-10-19 NOTE — PLAN OF CARE
Goal Outcome Evaluation:    Plan of Care Reviewed With: patient    Problem: Adult Behavioral Health Plan of Care  Goal: Adheres to Safety Considerations for Self and Others  Outcome: Progressing  Flowsheets (Taken 10/18/2023 2102)  Adheres to Safety Considerations for Self and Others: making progress toward outcome     Moe has been observed in the milieu watching TV  and listening to music, though withdrawn to self. He presented with  a blunted flat affect.  Pt is guarded on approach and during interactions. He was observed responding to internal stimuli, talking to self and making hand gestures. Pt requested to sign a 12 hour intent to leave. Writer informed pt he was unable to sign a 12 hour intent ,due to being committed. Pt stated he is voluntary and did not need to stay here. Writer praised pt for good behavior and  encouraged him to continue to do well in preparation for discharge. Pt received his Haldol Decanoate injection this shift with no issue. No outburst, aggression or agitation noted this shift. He continues to do well off the SIO. He was medication compliant, hygiene is clean and kept. Pt denied SI/SIB/HI/AVH. No behavior concerns.

## 2023-10-19 NOTE — PLAN OF CARE
Goal Outcome Evaluation:    Plan of Care Reviewed With: patient    Problem: Adult Behavioral Health Plan of Care  Goal: Adheres to Safety Considerations for Self and Others  Outcome: Progressing  Flowsheets (Taken 10/19/2023 1256)  Adheres to Safety Considerations for Self and Others: making progress toward outcome     Pt presents with a guarded affect. He has been withdrawn and isolative to his room for the majority of this shift resting in bed, intermittently pacing the gonzales. Pt however calm, polite and cooperative. No episode of aggression or agitation noted this shift . Pt visible for meals. He is medication compliant. Pt continues to respond to internal stimuli, however denies on assessment. Pt denies endorsing depression and anxiety, SI/SIB/HI/AVH. Hygiene is clean and kept. Pt is eating and drinking adequately. No behavior concerns.
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
## 2023-10-19 NOTE — PLAN OF CARE
"Team Note Due:  Monday    Assessment/Intervention/Current Symtoms and Care Coordination:  Chart review and met with team, discussed pt progress, symptomology, and response to treatment.  Discussed the discharge plan and any potential impediments to discharge.     Tasks Completed:   - Maryam, pt , returned CTC call. Maryam shared that pt's father called her yesterday after CTC spoke to him to share concern about pt returning home. CTC provided update on pt and Maryam shared that it sounds as though pt is not at baseline currently as pt is more isolative and attends groups but does not actively participate. Maryam reports that at baseline, pt is very sociable, active, talking, and participating. Maryam shared that when he is more isolative and withdrawn it is because he is trying to \"control\" himself so that he doesn't act out and prevent discharge. Maryam discussed IRTS placement and waiver programs for pt as she believes it is no longer appropriate for pt to live at home. Maryam plans to meet with pt on Monday at 3 PM.   - Met with pt to share that pt's  will meet with pt on Monday at 3 PM to discuss next steps. Pt asked if he will discharge Monday and was highly focused on when he would leave the hospital. CTC shared that we will continue to assess when pt is ready for discharge and shared that it is not likely he will discharge Monday. Pt was understanding of this and did not have any other questions.     Discharge Plan or Goal:  Current plan is to stabilize symptoms and develop safe disposition plan. Following hospitalization, plan is for pt to go to IRTS.      Barriers to Discharge:  Pt continues to respond to internal stimuli and is not at baseline.     Referral Status:  Pt has psychiatrist in the community and is receiving home health services for support with injections and medication management. Follow-up psychiatry appointment has been scheduled - AVS updated.   "   Legal Status:  MI Commitment and Bolanos Order (PD revoked)   County: [facility]  File Number: [id number]  Start and expiration date of commitment: Start date is 9/28/2023   Bolanos Ordered Medications: Zyprexa, Haldol, Risperdal, Invega, Clozaril, Thorazine    Contacts:  Medication Management/Psychiatry:  Name/Clinic: Dr. Felipe Zamorano [facility]   Phone: [phone]    [facility] on 10/6/2023 Nicola Carias RN      /ACT Team:  Name: Maryam Miranda  Phone: [phone]      Family Contacts:   Name: Nydenia Rosales - mother (BUTCH on file)  Phone: [phone]    Name: Jostin Doe - father (BUTCH on file)  Phone: [phone]     Upcoming Meetings and Dates/Important Information and next steps:  None

## 2023-10-20 PROCEDURE — 250N000013 HC RX MED GY IP 250 OP 250 PS 637: Performed by: PSYCHIATRY & NEUROLOGY

## 2023-10-20 PROCEDURE — 124N000002 HC R&B MH UMMC

## 2023-10-20 PROCEDURE — 99233 SBSQ HOSP IP/OBS HIGH 50: CPT | Performed by: PSYCHIATRY & NEUROLOGY

## 2023-10-20 RX ADMIN — FLUTICASONE FUROATE AND VILANTEROL TRIFENATATE 1 PUFF: 200; 25 POWDER RESPIRATORY (INHALATION) at 10:00

## 2023-10-20 RX ADMIN — POLYETHYLENE GLYCOL 3350 17 G: 17 POWDER, FOR SOLUTION ORAL at 10:00

## 2023-10-20 RX ADMIN — FLUTICASONE PROPIONATE 1 SPRAY: 50 SPRAY, METERED NASAL at 10:00

## 2023-10-20 RX ADMIN — FLUTICASONE PROPIONATE 1 SPRAY: 50 SPRAY, METERED NASAL at 19:55

## 2023-10-20 RX ADMIN — HALOPERIDOL 5 MG: 5 TABLET ORAL at 19:55

## 2023-10-20 RX ADMIN — HALOPERIDOL 5 MG: 5 TABLET ORAL at 10:01

## 2023-10-20 ASSESSMENT — ACTIVITIES OF DAILY LIVING (ADL)
ORAL_HYGIENE: INDEPENDENT
DRESS: INDEPENDENT
ADLS_ACUITY_SCORE: 28
HYGIENE/GROOMING: INDEPENDENT
ADLS_ACUITY_SCORE: 28
LAUNDRY: WITH SUPERVISION
ADLS_ACUITY_SCORE: 28
ADLS_ACUITY_SCORE: 28

## 2023-10-20 NOTE — PROGRESS NOTES
"[facility], [place]   Psychiatric Progress Note        Interim History:     The patient's care was discussed with the treatment team during the daily team meeting and/or staff's chart notes were reviewed.  Staff report patient slept for about 4.75 hours last night. Was reported to be awake at night and becoming very agitated when RN offered him prn med, see note below. In am slept in, after waking up was seen pacing hallway and listening to music. Limited interactions with team and other patients on the floor. Denies Auditory hallucinations and Visual hallucinations, stating that he is singing and talking with the music.     RN's note: \"Patient awake at the start of the shift and appeared manic with an intense affect, pacing vigorously in the hallway back and forth. Declined to respond to all assessment questions. Offered prn intervention and pt initially agreed. When this writer went to the med room to pull out meds, pt came up to the window\" Don't you try me again. I am not taking any medication\". Stated at writer fiercely and took off running to his room.Came out after 10 mins and was easily redirected to go back to his room. Finally went to sleep and slept for 4.75 hours this night.\"    Met with patient: patient was seen in his room. He again told me that he was bored because he had nothing else to do. Denied Auditory hallucinations and Visual hallucinations, contracted for safety here. Denied med side effects and had no other questions or concerns.    After visit with Moe I had 30 min long phone conversation with his mother Angeles. We discussed nature of Moe's illness and its symptoms, his medications. Mother was concerned that in addition to getting 100 mg of haldol Dec Moe is also on oral Haldol. I explained that 100 mg of Haldol might be not enough for her son who is 198 cm tall and weighs 227 kg. Mother agreed to continue oral Haldol. We also talked about chronicity of " "schizophrenia. Mother was unaware that Moe was using cannabis products and that those might have triggered psychotic symptoms. She was thankful for letting her know about that, said that she would talk to Moe about not using Cannabis products again. She again indicated that she would like Moe after discharge not to return to her place, but to go to IRTS and become more stable. She thanked for the care provided to her son and had no more questions or concerns.          Medications:      fluticasone  1 spray Both Nostrils BID    fluticasone-vilanterol  1 puff Inhalation Daily    haloperidol  5 mg Oral BID    Or    haloperidol lactate  5 mg Intramuscular BID    haloperidol decanoate  100 mg Intramuscular Q30 Days    polyethylene glycol  17 g Oral Daily          Allergies:     Allergies   Allergen Reactions    No Known Drug Allergy           Labs:   No results found for this or any previous visit (from the past 24 hour(s)).       Psychiatric Examination:     /81   Pulse 91   Temp 97.6  F (36.4  C) (Temporal)   Resp 16   Ht 1.981 m (6' 6\")   Wt 127.3 kg (280 lb 9.6 oz)   SpO2 100%   BMI 32.43 kg/m    Weight is 280 lbs 9.6 oz  Body mass index is 32.43 kg/m .    Orthostatic Vitals         Most Recent      Standing Orthostatic /68 10/20 1005    Standing Orthostatic Pulse (bpm) 106 10/20 1005           Appearance: awake, alert and dressed in hospital scrubs  Attitude: mostly cooperative  Eye Contact:  fair  Mood:  dysphoric today  Affect:  mood congruent, restricted  Speech:  decreased prosody,   Psychomotor Behavior:  no evidence of tardive dyskinesia, dystonia, or tics  Throught Process:  linear, concrete  Associations:  loosening of associations present  Thought Content:  patient appears to be responding to internal stimuli off and on, but not during our meeting; denies experiencing Auditory hallucinations, See discussion above.   Insight:  partial  Judgement:  limited - improving?  Oriented to:  " time, person, and place  Attention Span and Concentration: improving  Recent and Remote Memory:  fair    Clinical Global Impressions  First: 7/4 10/16/2023      Most recent:            Precautions:     Behavioral Orders   Procedures    Assault precautions    Code 1 - Restrict to Unit    Routine Programming     As clinically indicated    Self Injury Precaution    Status 15     Every 15 minutes.          DIagnoses:     Schizophrenia, schizoaffective, chronic with acute exacerbation          Plan:     Will continue on oral haldol. Got 100 mg of Haldol Decanoate 10/19/2023. No medication changes today 10/20/2023. Will continue to provide support and structure. Will, likely, go to IR as family is uncomfortable taking him home, See discussion above.    Total time spent was 52 minutes. Over 50% of times was spent counseling and coordination of care regarding coping skills, medication and discharge planning.

## 2023-10-20 NOTE — PLAN OF CARE
"  Problem: Sleep Disturbance  Goal: Adequate Sleep/Rest  Outcome: Not Progressing   Goal Outcome Evaluation:  Patient awake at the start of the shift and appeared manic with an intense affect, pacing vigorously in the hallway back and forth. Declined to respond to all assessment questions. Offered prn intervention and pt initially agreed. When this writer went to the med room to pull out meds, pt came up to the window\" Don't you try me again. I am not taking any medication\". Stated at writer fiercely and took off running to his room.Came out after 10 mins and was easily redirected to go back to his room. Finally went to sleep and slept for 4.75 hours this night.  "

## 2023-10-20 NOTE — PLAN OF CARE
BEH IP Unit Acuity Rating Score (UARS)  Patient is given one point for every criteria they meet.    CRITERIA SCORING   On a 72 hour hold, court hold, committed, stay of commitment, or revocation 1/1    Patient LOS on BEH unit exceeds 20 days 0/1  LOS: 7   Patient under guardianship, 55+, otherwise medically complex, or under age 11 0/1   Suicide ideation without relief of precipitating factors 0/1   Current plan for suicide 0/1   Current plan for homicide 0/1   Imminent risk or actual attempt to seriously harm another without relief of factors precipitating the attempt 0/1   Severe dysfunction in daily living (ex: complete neglect for self care, extreme disruption in vegetative function, extreme deterioration in social interactions) 0/1   Recent (last 7 days) or current physical aggression in the ED or on unit 0/1   Restraints or seclusion episode in past 72 hours 0/1   Recent (last 7 days) or current verbal aggression, agitation, yelling, etc., while in the ED or unit 0/1   Active psychosis 1/1   Need for constant or near constant redirection (from leaving, from others, etc).  0/1   Intrusive or disruptive behaviors 0/1   TOTAL 2

## 2023-10-20 NOTE — PLAN OF CARE
Team Note Due:  Monday    Assessment/Intervention/Current Symtoms and Care Coordination:  Chart review and met with team, discussed pt progress, symptomology, and response to treatment.  Discussed the discharge plan and any potential impediments to discharge.     Tasks Completed:   - Pt's dad called [facility] to get direct phone number to share with pt's mom. [facility] discussed conversation with , Maryam, and confirmed that pt's dad is not comfortable with pt returning home. [facility] encouraged pt's dad to openly share concerns with CTC during discharge planning calls as previous conversation pt's dad shared that pt can return home. Pt's dad shared that he does not want pt to know he is the reason he can't return home. [facility] validated this and shared that to assist with acceptance of this new discharge plan, it can be helpful for pt to hear this news from someone he has a longstanding relationship with as it can be challenging for them to accept this from hospital staff. Pt's dad again shared he doesn't want to communicate this to pt. [facility] discussed current discharge plan to discharge to [facility] where pt  can assist with establishing waiver services for long-term placement.  - Spoke to pt's mom who shared concerns regarding pt's behavior prior to hospitalization. Pt's mom shared that pt was not sleeping more than 2-3 hours per night and was hyperactive. Pt kicked holes in the wall and hit the ceiling by jumping up, causing a hole in the ceiling, as well. [facility] discussed current discharge plan to go to [facility] and pt's mom was in agreement with this plan. Pt's mom shared that she is not in agreement with a long-term plan keeping pt from returning to her home. Pt's mom shared that they can reassess this once pt completes I[facility]S. Pt's mom had questions regarding medications and side effects and requested call from provider. [facility] updated provider.    Discharge Plan or Goal:  Current plan is to stabilize symptoms and  develop safe disposition plan. Following hospitalization, plan is for pt to go to [facility].      Barriers to Discharge:  Pt continues to respond to internal stimuli and is not at baseline.     Referral Status:  Pt has psychiatrist in the community and is receiving home health services for support with injections and medication management. Follow-up psychiatry appointment has been scheduled - AVS updated.     Legal Status:  MI Commitment and Bolanos Order (PD revoked)   County: [facility]  File Number: [id number]  Start and expiration date of commitment: Start date is 9/28/2023   Bolanos Ordered Medications: Zyprexa, Haldol, Risperdal, Invega, Clozaril, Thorazine    Contacts:  Medication Management/Psychiatry:  Name/Clinic: Dr. Felipe Zamorano [facility]   Phone: [phone]    [facility] on 10/6/2023 Nicola Carias RN      /ACT Team:  Name: Maryam Miranda  Phone: [phone]      Family Contacts:   Name: Angeles Whiteadrianarobyn - mother (BUTCH on file)  Phone: [phone]    Name: Jostin Doe - father (BUTCH on file)  Phone: [phone]     Upcoming Meetings and Dates/Important Information and next steps:  None

## 2023-10-20 NOTE — PLAN OF CARE
Problem: Psychotic Signs/Symptoms  Goal: Optimal Cognitive Function (Psychotic Signs/Symptoms)  Intervention: Support and Promote Cognitive Ability  Recent Flowsheet Documentation  Taken 10/20/2023 1028 by Juani Barcenas RN  Trust Relationship/Rapport:   care explained   choices provided   emotional support provided   Goal Outcome Evaluation:       Pt. Spent majority of the day resting in bed, briefly out in the lounge for breakfast and lunch, listening to music, isolative to self, avoided social contact. Refused all groups. Pt. Presents with a flat guarded affect. Pt. Reports having a daily bowel movement, and doesn't see the need for a stool softener.  Restless and briefly paced the hallway listening to music. Poor concentration avoids communication, speech is clear and coherent, thought process appears logical, hygiene well kempt, denies auditory and visual hallucinations, stating he's signing and talking with the music. Denies anxiety and depression, and all other psych symptoms. Medication compliance and contracted for safety.

## 2023-10-20 NOTE — PLAN OF CARE
"Goal Outcome Evaluation:    Patient presents with a flat/blunted affect. Reports mood as \"Okay.\" Is guarded, suspicious, and mistrustful; yet is calm, polite, and cooperative with no aggression noted. Thought content presents as impoverished. Thought process presents as relevant. Speech presents as paucity, clear, and coherent. Eye contact is fair. Patient denies depression, anxiety, suicidal ideation, SIB, homicidal ideation, and auditory/visual hallucinations. Patient appears to be responding to internal stimuli this shift; AEB, staring into space and talking with imaginary person or persons. No medical issues noted. Vital signs stable. Medication compliant. Patient showered this shift. Did not attend group this shift. Ate evening meal. Patient was out in the milieu this shift watching T.V. and movies. No interaction with staff and peers noted.     /80   Pulse 88   Temp 97.2  F (36.2  C) (Temporal)   Resp 16   Ht 1.981 m (6' 6\")   Wt 127.3 kg (280 lb 9.6 oz)   SpO2 100%   BMI 32.43 kg/m                     "

## 2023-10-21 PROCEDURE — 124N000002 HC R&B MH UMMC

## 2023-10-21 PROCEDURE — 250N000013 HC RX MED GY IP 250 OP 250 PS 637: Performed by: PSYCHIATRY & NEUROLOGY

## 2023-10-21 RX ADMIN — ACETAMINOPHEN 650 MG: 325 TABLET, FILM COATED ORAL at 17:55

## 2023-10-21 RX ADMIN — FLUTICASONE FUROATE AND VILANTEROL TRIFENATATE 1 PUFF: 200; 25 POWDER RESPIRATORY (INHALATION) at 09:58

## 2023-10-21 RX ADMIN — FLUTICASONE PROPIONATE 1 SPRAY: 50 SPRAY, METERED NASAL at 09:58

## 2023-10-21 RX ADMIN — HALOPERIDOL 5 MG: 5 TABLET ORAL at 20:25

## 2023-10-21 RX ADMIN — TRAZODONE HYDROCHLORIDE 50 MG: 50 TABLET ORAL at 20:25

## 2023-10-21 RX ADMIN — FLUTICASONE PROPIONATE 1 SPRAY: 50 SPRAY, METERED NASAL at 20:25

## 2023-10-21 RX ADMIN — HALOPERIDOL 5 MG: 5 TABLET ORAL at 09:57

## 2023-10-21 ASSESSMENT — ACTIVITIES OF DAILY LIVING (ADL)
HYGIENE/GROOMING: INDEPENDENT
ADLS_ACUITY_SCORE: 28
LAUNDRY: WITH SUPERVISION
ADLS_ACUITY_SCORE: 28
DRESS: INDEPENDENT
ORAL_HYGIENE: INDEPENDENT
ADLS_ACUITY_SCORE: 28
ADLS_ACUITY_SCORE: 28

## 2023-10-21 NOTE — PLAN OF CARE
"Goal Outcome Evaluation:    Patient presents with a flat/blunted affect, occasional smiling noted. Reports mood as \"I'm fine.\" Is guarded, suspicious, and mistrustful; yet is calm, polite, and cooperative with no aggression noted. Thought content presents as denial. Thought process presents as relevant. Speech presents as paucity, clear, and coherent. Eye contact is fair. Patient denies depression, anxiety, suicidal ideation, SIB, homicidal ideation, and auditory/visual hallucinations. Patient appears to be responding to internal stimuli this shift; AEB, staring into space and talking with imaginary person or persons. No medical issues noted. Vital signs stable. Medication compliant. Patient requested and received PRN Trazodone for sleep. Did not attend group this shift. Ate evening meal. Patient was out in the milieu this shift watching T.V. and movies. No interaction with staff and peers noted.      /63 (BP Location: Right arm, Patient Position: Sitting, Cuff Size: Adult Large)   Pulse 90   Temp 98.7  F (37.1  C) (Oral)   Resp 16   Ht 1.981 m (6' 6\")   Wt 127.3 kg (280 lb 9.6 oz)   SpO2 98%   BMI 32.43 kg/m                   "

## 2023-10-21 NOTE — PLAN OF CARE
Problem: Sleep Disturbance  Goal: Adequate Sleep/Rest  Outcome: Progressing   Goal Outcome Evaluation:  Assumed care of patient at 1130 pm, in bed sleeping without any s/s distress..Woke up and pacing the hallway without any problem. Pleasant on approach and easily directable.No behavioral issues noted.No signs of SI,HI,SIB or psychosis noted.15 minute safety checks maintained as per policy.Nursing will continue to monitor as per plan of care.Slept for 5.75 hours

## 2023-10-21 NOTE — PLAN OF CARE
Problem: Adult Behavioral Health Plan of Care  Goal: Optimized Coping Skills in Response to Life Stressors  Outcome: Progressing   Goal Outcome Evaluation:         Pt. Presents with a guarded affect, minimal engagement with peers, visible in the lounge briefly for meals and watching TV, isolative to self. Refused all groups including community meeting, Spent majority of the day resting in bed listening to music. Speech is clear, resistance to share information and avoids eye contact, thought process appears distracted, hygiene is well kempt. Dismissive with assessment questions, vague reply I'm fine, denies all psych symptoms, appears to be responding by briefly pacing the hallway listening to music and talking to self with hand gestures. Refused Miralax reporting a daily BM, took scheduled Haldol. Contracted for safety.

## 2023-10-22 PROCEDURE — 124N000002 HC R&B MH UMMC

## 2023-10-22 PROCEDURE — 250N000013 HC RX MED GY IP 250 OP 250 PS 637: Performed by: PSYCHIATRY & NEUROLOGY

## 2023-10-22 RX ADMIN — FLUTICASONE FUROATE AND VILANTEROL TRIFENATATE 1 PUFF: 200; 25 POWDER RESPIRATORY (INHALATION) at 10:08

## 2023-10-22 RX ADMIN — HALOPERIDOL 5 MG: 5 TABLET ORAL at 20:25

## 2023-10-22 RX ADMIN — FLUTICASONE PROPIONATE 1 SPRAY: 50 SPRAY, METERED NASAL at 20:25

## 2023-10-22 RX ADMIN — FLUTICASONE PROPIONATE 1 SPRAY: 50 SPRAY, METERED NASAL at 10:08

## 2023-10-22 RX ADMIN — HALOPERIDOL 5 MG: 5 TABLET ORAL at 10:07

## 2023-10-22 ASSESSMENT — ACTIVITIES OF DAILY LIVING (ADL)
ADLS_ACUITY_SCORE: 28
ADLS_ACUITY_SCORE: 28
LAUNDRY: WITH SUPERVISION
ADLS_ACUITY_SCORE: 28
ADLS_ACUITY_SCORE: 28
DRESS: INDEPENDENT
ADLS_ACUITY_SCORE: 28
HYGIENE/GROOMING: INDEPENDENT
ADLS_ACUITY_SCORE: 28
ADLS_ACUITY_SCORE: 28
ORAL_HYGIENE: INDEPENDENT

## 2023-10-22 NOTE — PLAN OF CARE
Problem: Sleep Disturbance  Goal: Adequate Sleep/Rest  Outcome: Progressing   Goal Outcome Evaluation:  [id number]: Pt sleeping at the start of the shift in no apparent distress. Continue on suicide/SIB/assault  precautions. No SI related behavior noted.

## 2023-10-22 NOTE — PLAN OF CARE
"  Problem: Psychotic Signs/Symptoms  Goal: Optimal Cognitive Function (Psychotic Signs/Symptoms)  Intervention: Support and Promote Cognitive Ability  Recent Flowsheet Documentation  Taken 10/22/2023 1018 by Juani Barcenas RN  Trust Relationship/Rapport:   care explained   choices provided   emotional support provided   Goal Outcome Evaluation:       Pt. Calm and cooperative, presents with a guarded affect, out in the lounge for meals on double portions eat 100% of breakfast and lunch, listens to music and avoids social contact. Had a shower after lunch. Observed pt. Sitting on his bed looking out the window wearing headphones and talking to self  \"see you later alligator\".  Resistance to share information avoids eye contact, appears annoyed with assessment questions. Speech is clear, thought process is organized with periods of poor concentration, appears preoccupied at times. Denies auditory and visual hallucinations, appears to be responding. Denies anxiety and depression, denies SI/HI/SIB and pain, refused scheduled stool sample reporting a daily BM, all other meds administered. Contracted for safety. Spent the afternoon watching TV                      "

## 2023-10-22 NOTE — PLAN OF CARE
"Goal Outcome Evaluation:    Patient presents with a flat/blunted affect. Reports mood as \"Okay.\" Is guarded, suspicious, and mistrustful; yet is calm, polite, and cooperative with no aggression noted. Thought content presents as denial. Thought process presents as relevant. Speech presents as paucity, clear, and coherent. Eye contact is fair. Patient denies depression, anxiety, suicidal ideation, SIB, homicidal ideation, and auditory/visual hallucinations. Patient appears to be responding to internal stimuli this shift; AEB, staring into space and talking with imaginary person or persons. No medical issues noted. Vital signs stable. Medication compliant. Ate evening meal. Patient was out in the milieu this shift watching T.V. and movies. No interaction with staff and peers noted.      /71 (BP Location: Left arm)   Pulse 93   Temp 98.5  F (36.9  C) (Oral)   Resp 16   Ht 1.981 m (6' 6\")   Wt 127.3 kg (280 lb 9.6 oz)   SpO2 100%   BMI 32.43 kg/m                   "

## 2023-10-23 PROCEDURE — 99232 SBSQ HOSP IP/OBS MODERATE 35: CPT | Performed by: PSYCHIATRY & NEUROLOGY

## 2023-10-23 PROCEDURE — 124N000002 HC R&B MH UMMC

## 2023-10-23 PROCEDURE — 250N000013 HC RX MED GY IP 250 OP 250 PS 637: Performed by: PSYCHIATRY & NEUROLOGY

## 2023-10-23 PROCEDURE — G0177 OPPS/PHP; TRAIN & EDUC SERV: HCPCS

## 2023-10-23 RX ADMIN — HALOPERIDOL 5 MG: 5 TABLET ORAL at 08:05

## 2023-10-23 RX ADMIN — FLUTICASONE FUROATE AND VILANTEROL TRIFENATATE 1 PUFF: 200; 25 POWDER RESPIRATORY (INHALATION) at 08:05

## 2023-10-23 RX ADMIN — FLUTICASONE PROPIONATE 1 SPRAY: 50 SPRAY, METERED NASAL at 08:05

## 2023-10-23 RX ADMIN — FLUTICASONE PROPIONATE 1 SPRAY: 50 SPRAY, METERED NASAL at 19:47

## 2023-10-23 RX ADMIN — HALOPERIDOL 5 MG: 5 TABLET ORAL at 19:47

## 2023-10-23 ASSESSMENT — ACTIVITIES OF DAILY LIVING (ADL)
ADLS_ACUITY_SCORE: 28
HYGIENE/GROOMING: SHOWER
ADLS_ACUITY_SCORE: 28
DRESS: INDEPENDENT

## 2023-10-23 NOTE — PLAN OF CARE
Problem: Sleep Disturbance  Goal: Adequate Sleep/Rest  Outcome: Progressing   Goal Outcome Evaluation:         Pt was seen be to sleep 6 hours.  No suicide/SIB/assault  behaviors noticed or reported. 15 minutes  Safety check done. No PRNs requested or given on this shift.

## 2023-10-23 NOTE — PLAN OF CARE
BEH IP Unit Acuity Rating Score (UARS)  Patient is given one point for every criteria they meet.    CRITERIA SCORING   On a 72 hour hold, court hold, committed, stay of commitment, or revocation 1/1    Patient LOS on BEH unit exceeds 20 days 0/1  LOS: 10   Patient under guardianship, 55+, otherwise medically complex, or under age 11 0/1   Suicide ideation without relief of precipitating factors 0/1   Current plan for suicide 0/1   Current plan for homicide 0/1   Imminent risk or actual attempt to seriously harm another without relief of factors precipitating the attempt 0/1   Severe dysfunction in daily living (ex: complete neglect for self care, extreme disruption in vegetative function, extreme deterioration in social interactions) 0/1   Recent (last 7 days) or current physical aggression in the ED or on unit 0/1   Restraints or seclusion episode in past 72 hours 0/1   Recent (last 7 days) or current verbal aggression, agitation, yelling, etc., while in the ED or unit 0/1   Active psychosis 1/1   Need for constant or near constant redirection (from leaving, from others, etc).  0/1   Intrusive or disruptive behaviors 0/1   TOTAL 2

## 2023-10-23 NOTE — PLAN OF CARE
Team Note Due:  Monday    Assessment/Intervention/Current Symtoms and Care Coordination:  Chart review and met with team, discussed pt progress, symptomology, and response to treatment.  Discussed the discharge plan and any potential impediments to discharge.     Tasks Completed:   - Met with pt and his , Maryam, to discuss next steps. Shared recommendation for [facility] placement and pt reported he understood and agreed to this.     Discharge Plan or Goal:  Current plan is to stabilize symptoms and develop safe disposition plan. Following hospitalization, plan is for pt to go to [facility].      Barriers to Discharge:  Pt continues to respond to internal stimuli and is not at baseline.     Referral Status:  Pt has psychiatrist in the community and is receiving home health services for support with injections and medication management. Follow-up psychiatry appointment has been scheduled - AVS updated.     Legal Status:  MI Commitment and Bolanos Order (PD revoked)   County: [facility]  File Number: [id number]  Start and expiration date of commitment: Start date is 9/28/2023   Bolanos Ordered Medications: Zyprexa, Haldol, Risperdal, Invega, Clozaril, Thorazine    Contacts:  Medication Management/Psychiatry:  Name/Clinic: Dr. Felipe Zamorano [facility]   Phone: [phone]    [facility] on 10/6/2023 Nicola Carias RN      /ACT Team:  Name: Maryam Miranda  Phone: [phone]      Family Contacts:   Name: Nydenia Rosales - mother (BUTCH on file)  Phone: [phone]    Name: Jostin Omar Maykaylin - father (BUTCH on file)  Phone: [phone]     Upcoming Meetings and Dates/Important Information and next steps:  None

## 2023-10-23 NOTE — PLAN OF CARE
"Goal Outcome Evaluation:    Patient was intermittently visible in the milieu, walking in the hallway with headphones and listening to music. Patient was also seen in the lounge watching movies and sports, very isolative and withdrawn, did not interact with peers or staff. Earlier,patient was spent in room laying in bed and talking to self and doing hand gestures,appearing to responding to internal stimuli. Patient was uncooperative with mental health assessment, ignored/refused to answer to most of the questions, denies hallucinations and anxiety. Patient did have a shower and changed to clean clothes. Patient ate dinner snacks, drinks appropriately. Patient reluctantly agreed for VS check at 7.50pm and took all his scheduled HS medication, did not request/receive any prn medication during shift.  Blood pressure 111/71, pulse 82, temperature 97.6  F (36.4  C), temperature source Temporal, resp. rate 16, height 1.981 m (6' 6\"), weight 127.3 kg (280 lb 9.6 oz), SpO2 99%.        "

## 2023-10-23 NOTE — CARE PLAN
10/23/23 1422   Group Therapy Session   Group Attendance attended group session   Time Session Began 1300   Time Session Ended 1345   Total Time (minutes) 45   Total # Attendees 6   Group Type life skill;other (see comments)  (OT)   Group Topic Covered balanced lifestyle;coping skills/lifestyle management;leisure exploration/use of leisure time;structured socialization   Group Session Detail OT - Topic:  Focus of group was on identification of specific coping skills for mental health management using the game of Pictionary. The process also includes practice of brainstorming techniques and communication skills in a group setting, listening to others and sharing ideas.   Patient Response/Contribution able to recall/repeat info presented;cooperative with task;discussed personal experience with topic;expressed understanding of topic   Patient Participation Detail Pt was late to group yet easily joined in and complied when asked to take off headphones and turn them off. He did attempt to guess a couple of the puzzles and listen to discussion, although he would occasionally interrupt someone to share his thought, appearing to not be aware of that person still talking. He was occasionally laughing to himself. He did volunteer to create a couple of pictures of skills, using examples offered by staff.

## 2023-10-23 NOTE — PROGRESS NOTES
"[facility], [place]   Psychiatric Progress Note        Interim History:     The patient's care was discussed with the treatment team during the daily team meeting and/or staff's chart notes were reviewed.  Staff report patient slept for about 6 hours. He had overall, OK weekend. Was seen talking to self/attending to internal stimuli, but denied experiencing Auditory hallucinations. Appeared to be suspicious about people' intentions and guarded, but, overall, polite and cooperative with unit rules and his meds.    Met with patient: patient was seen in his room. He said that his mood was OK and denied having any med side effects. I told him that on Friday I talked to his mother and she was uncomfortable with the idea of him returning home and would like him, instead, go to IR. Moe admitted that he was taken aback by that idea and asked about IRTS. When I explained what IRTS is and length of typical stay at [facility], Moe told me that he had no desire to go there and would talk to his mother and to his CM about being discharged home. He contracted for safety and had no more questions or concerns.          Medications:      fluticasone  1 spray Both Nostrils BID    fluticasone-vilanterol  1 puff Inhalation Daily    haloperidol  5 mg Oral BID    Or    haloperidol lactate  5 mg Intramuscular BID    haloperidol decanoate  100 mg Intramuscular Q30 Days          Allergies:     Allergies   Allergen Reactions    No Known Drug Allergy           Labs:   No results found for this or any previous visit (from the past 24 hour(s)).       Psychiatric Examination:     /70 (BP Location: Left arm, Patient Position: Sitting)   Pulse 93   Temp 97.4  F (36.3  C) (Temporal)   Resp 16   Ht 1.981 m (6' 6\")   Wt 127.3 kg (280 lb 9.6 oz)   SpO2 99%   BMI 32.43 kg/m    Weight is 280 lbs 9.6 oz  Body mass index is 32.43 kg/m .    Orthostatic Vitals         Most Recent      Sitting Orthostatic /71 " 10/22 1635    Standing Orthostatic /75 10/22 1635           Appearance: awake, alert and dressed in hospital scrubs  Attitude: mostly cooperative  Eye Contact:  fair  Mood:  dysphoric    Affect:  mood congruent, restricted  Speech:  decreased prosody,   Psychomotor Behavior:  no evidence of tardive dyskinesia, dystonia, or tics  Throught Process:  linear, concrete  Associations:  tighter  Thought Content:  patient appears to be responding to internal stimuli off and on, but not during our meeting; denies experiencing Auditory hallucinations, See discussion above.   Insight:  partial  Judgement:  limited - improving?  Oriented to:  time, person, and place  Attention Span and Concentration: improving  Recent and Remote Memory:  fair    Clinical Global Impressions  First: 7/4 10/16/2023      Most recent:            Precautions:     Behavioral Orders   Procedures    Assault precautions    Code 1 - Restrict to Unit    Routine Programming     As clinically indicated    Self Injury Precaution    Status 15     Every 15 minutes.          DIagnoses:     Schizophrenia, schizoaffective, chronic with acute exacerbation          Plan:     Will continue on oral haldol. Got 100 mg of Haldol Decanoate 10/19/2023. Continues taking 5 mg of Haldol bid. No medication changes today 10/23/2023. Will continue to provide support and structure. Will, likely, go to [facility] as family is uncomfortable taking him home, See discussion above.    Total time spent was 36 minutes. Over 50% of times was spent counseling and coordination of care regarding coping skills, medication and discharge planning.

## 2023-10-23 NOTE — PROGRESS NOTES
"During patient checks at 22:15 hrs, Moe's light was turned off and his room was dark. This writer proceeded to quietly open his bedroom door to check on him and Moe became upset and irritated stating...    Moe: \"What are you doing?\"    This writer: \"Sorry, I was just doing patient checks.\"    Moe: \"Get the fuck out of my room nigger!\" \"Why the fuck are you doing that?\" \"Look through the window!\"     This writer: \"Sorry about that, staff are required to open the patient's door to visually observe patients during checks.\"    Moe: \"Get the fuck out!\"  \"What if I was changing at the time?\" \"I'll have my father speak to your manager when he comes to pick me up tomorrow.\" \"Get out!\"      "

## 2023-10-23 NOTE — PLAN OF CARE
Problem: Psychotic Signs/Symptoms  Goal: Optimal Cognitive Function (Psychotic Signs/Symptoms)  Intervention: Support and Promote Cognitive Ability  Recent Flowsheet Documentation  Taken 10/23/2023 1028 by Juani Barcenas, RN  Trust Relationship/Rapport:   care explained   choices provided   emotional support provided   Goal Outcome Evaluation:       Pt. Out in the lounge late for breakfast, on double portions eat 100%, pt. Spent majority of the morning resting in bed wearing head phones. Briefly out in the lounge watching TV, isolative to self, refused morning groups including community meeting, attended afternoon group, partial participation.  Avoids social contact, presents with a guarded affect, resistant to share information. Speech is clear and coherent, appears preoccupied at times by starring blankly. Dismissive with assessment questions, looks away and states I'm fine, denies SI/HI/SIB and hallucinations, pt.sitting in the lounge talking to himself. Partially medication compliance, refused stool softener, order discontinues by Dr. MACK, contracts for safety.      Late afternoon pt. Was filling out his menu, laughing and making appropriate jokes with staff.

## 2023-10-23 NOTE — PROVIDER NOTIFICATION
10/23/23 1147   Individualization/Patient Specific Goals   Patient Personal Strengths family/social support;interests/hobbies   Patient Vulnerabilities lacks insight into illness;poor impulse control   Anxieties, Fears or Concerns Pt is eager to discharge from the hospital and lacks insight into his illness. Pt appears to respond to internal stimuli. However, pt denies experiencing hallucinations.   Individualized Care Needs Pt would benefit from continued medication management and symptom stabilization. Pt continues to respond to internal stimuli and shows a lack of insight into his illness.   Interprofessional Rounds   Summary Pt was isolated to his room most of the weekend and avoided social contact. Pt did not go to any groups all weekend. Pt continues to respond to internal stimuli while denying that he is experiencing hallucinations. Pt is eager to leave the hospital.   Participants nursing;OT;psychiatrist;CTC;psychotherapy   Behavioral Team Discussion   Participants Nic Morgan MD; Juani Barcenas RN; Becca Schwab, [facility] Center[facility]; Sunshine Burns, OTR/L; Jerome Marquez, LMFT, ATR-BC   Progress Pt has been withdrawn and isolative. Pt wears headphones frequently on the milieu and can be seen making hand gestures and speaking to himself. Pt appears to be responding to internal stimuli. However, pt denies that he is experiencing hallucinations. Pt is focused on leaving the hospital and appears to be masking symptoms.   Anticipated length of stay Pt continues to need further stabilization and medication management.   Continued Stay Criteria/Rationale Pt continues to respond to internal stimuli and is isolative and withdrawn. Per collateral contacts, pt's baseline is more cooperative, pleasant, and social.   Medical/Physical No issues noted   Precautions SIB, Assault precautions   Plan Multidisciplinary team evaluation, medication management, care coordination   Rationale for change in precautions or plan N/A    Safety Plan Per unit protocol   Anticipated Discharge Disposition [facility]     PRECAUTIONS AND SAFETY    Behavioral Orders   Procedures    Assault precautions    Code 1 - Restrict to Unit    Routine Programming     As clinically indicated    Self Injury Precaution    Status 15     Every 15 minutes.       Safety  Safety WDL: WDL  Patient Location: patient room, own  Observed Behavior: calm, walking  Observed Behavior (Comment):  (listening to music)  Safety Measures: safety plan reviewed, safety rounds completed  Diversional Activity: music, television  De-Escalation Techniques: 1:1 observation initiated, appropriate behavior reinforced, diversional activity encouraged  Suicidality: Status 15, Behavioral scrubs (pajamas), Minimal furniture in room, Minimal personal belongings in room  Assault: status 15, private room, behavioral scrubs (pajamas), minimal furniture in room, minimal personal belongings in room  Additional Documentation:  (SIB Precaution.)

## 2023-10-24 PROCEDURE — 250N000013 HC RX MED GY IP 250 OP 250 PS 637: Performed by: PSYCHIATRY & NEUROLOGY

## 2023-10-24 PROCEDURE — 124N000002 HC R&B MH UMMC

## 2023-10-24 PROCEDURE — 99232 SBSQ HOSP IP/OBS MODERATE 35: CPT | Performed by: PSYCHIATRY & NEUROLOGY

## 2023-10-24 RX ADMIN — FLUTICASONE PROPIONATE 1 SPRAY: 50 SPRAY, METERED NASAL at 09:53

## 2023-10-24 RX ADMIN — FLUTICASONE FUROATE AND VILANTEROL TRIFENATATE 1 PUFF: 200; 25 POWDER RESPIRATORY (INHALATION) at 09:53

## 2023-10-24 RX ADMIN — HALOPERIDOL 5 MG: 5 TABLET ORAL at 20:25

## 2023-10-24 RX ADMIN — HALOPERIDOL 5 MG: 5 TABLET ORAL at 09:52

## 2023-10-24 RX ADMIN — FLUTICASONE PROPIONATE 1 SPRAY: 50 SPRAY, METERED NASAL at 20:26

## 2023-10-24 ASSESSMENT — ACTIVITIES OF DAILY LIVING (ADL)
ADLS_ACUITY_SCORE: 28
HYGIENE/GROOMING: INDEPENDENT
ADLS_ACUITY_SCORE: 28
LAUNDRY: WITH SUPERVISION
ADLS_ACUITY_SCORE: 28
ORAL_HYGIENE: INDEPENDENT
DRESS: SCRUBS (BEHAVIORAL HEALTH);INDEPENDENT
ADLS_ACUITY_SCORE: 28

## 2023-10-24 NOTE — PLAN OF CARE
"Goal Outcome Evaluation:      Patient was observed sleeping at the beginning of the shift. Patient woke up during geoup time and was requesting for his own clothes, stating that he had discussed with the provider. Patient has a pair of shorts, sweatshirt x 1, sweat pant x 1, and all three have strings on them.Patient did not want strings removed and even so the pants would be too loose without the string. Patient was encouraged to have someone bring in clothes that have no strings. Patient demonstrated understanding and said \" I will just wear those the day I discharge\". Patient asked to shower, ate 100 % of his dinner and stayed in the lounge after dinner. Patient continues to be dismissive with assessment questions. Patient was more talkative and laughing with writer once he learned that writer could speak Swahili language. Patient is otherwise pleasant, polite and cooperative with unit rules. pt denies pain, SI, HI, and hallucinations,and does not appear to be responding to internal stimuli. No behaviors noted.    Vitals./76   Pulse 85   Temp 97.6  F (36.4  C) (Temporal)   Resp 16   Ht 1.981 m (6' 6\")   Wt 127.3 kg (280 lb 9.6 oz)   SpO2 100%   BMI 32.43 kg/m     "

## 2023-10-24 NOTE — PLAN OF CARE
"Goal Outcome Evaluation:    Adult Inpatient Safety Plan:     [facility] Adult Inpatient Mental Health Units           [address]                                Camilla Doe     Attempted to meet with @Camilla Doe@ 3: 39 pm today , he was in his room listening to music. Writer inquired about meeting to develop and complete a safety plan. Pt declined to engage and develop a safety plan. Will attempt to meet with pt at a later time to attempt to complete a a safety plan. Will provide safety steps and crisis resources on patient's AVS that will be provided at discharge .     Writer approached pt in his room approx 3:30 pm. Writer asked if he had a few moments to complete the safety plan with writer. He said he already did it. Writer indicated it had not been done and that it would be a short meeting. He got agitated verbally and said \" I'm not doing it, don't ask me again!\"               If unable to maintain safety despite working your plan, call 911 or go to my nearest emergency department.         Patient helped develop this safety plan and agreed to use it when needed.  Pt has been given a copy of this plan.          Today s date:  October 23, 2023  Completed by Clinician Name/ Credentials:  Jerome Marquez, LMFT- ATR-BC  Licensed Marriage and Family Therapist and   Registered and Board Certified Art Therapist          Adapted from Safety Plan Template 2008 Rafia Berrios and Ace Gustafson is reprinted with the express permission of the authors.  No portion of the Safety Plan Template may be reproduced without the express, written permission.  You can contact the authors at [email or web address] or [email or web address].                         "

## 2023-10-24 NOTE — PLAN OF CARE
Problem: Plan of Care - These are the overarching goals to be used throughout the patient stay.    Goal: Optimal Comfort and Wellbeing  Intervention: Provide Person-Centered Care  Recent Flowsheet Documentation  Taken 10/24/2023 1003 by Juani Barcenas RN  Trust Relationship/Rapport:   care explained   choices provided   emotional support provided   questions encouraged   Goal Outcome Evaluation:             Pt stayed in bed all morning, refused breakfast and morning group, easily awoken for lunch on double portions eat 100%, pt. Spent majority of the afternoon resting in bed listening to headphone, Pt. Presents with a flat guarded affect, calm and cooperative all shift. Resistant to share information, appears annoyed with questions. Briefly out in the milieu watching TV, wearing headphones, isolative to self. Denies anxiety and depression, denies pain and all other psych symptoms. No observation of pt. Responding to internal stimuli.  Medication compliance and contracted for safety.

## 2023-10-24 NOTE — PLAN OF CARE
Team Note Due:  Monday    Assessment/Intervention/Current Symtoms and Care Coordination:  Chart review and met with team, discussed pt progress, symptomology, and response to treatment.  Discussed the discharge plan and any potential impediments to discharge.     Discharge Plan or Goal:  Current plan is to stabilize symptoms and develop safe disposition plan. Following hospitalization, plan is for pt to go to [facility].      Barriers to Discharge:  Pt continues to respond to internal stimuli and is not at baseline.     Referral Status:  Pt has psychiatrist in the community and is receiving home health services for support with injections and medication management. Follow-up psychiatry appointment has been scheduled - AVS updated.     Legal Status:  MI Commitment and Bolanos Order (PD revoked)   County: [facility]  File Number: [id number]  Start and expiration date of commitment: Start date is 9/28/2023   Bolanos Ordered Medications: Zyprexa, Haldol, Risperdal, Invega, Clozaril, Thorazine    Contacts:  Medication Management/Psychiatry:  Name/Clinic: Dr. Felipe Zamorano [facility]   Phone: [phone]    [facility] on 10/6/2023 Nicola Carias RN      /ACT Team:  Name: Maryam Miranda  Phone: [phone]      Family Contacts:   Name: Nysherlyncatarina Whiteadrianarobyn - mother (BUTCH on file)  Phone: [phone]    Name: Jostindioni Doe - father (BUTCH on file)  Phone: [phone]     Upcoming Meetings and Dates/Important Information and next steps:  None

## 2023-10-24 NOTE — PLAN OF CARE
"Goal Outcome Evaluation:    Writer approached pt in his room approx 3:30 pm. Writer asked if he had a few moments to complete the safety plan with writer. He said he already did it. Writer indicated it had not been done and that it would be a short meeting. He got agitated verbally and said \" I'm not doing it, don't ask me again!\"                        "

## 2023-10-24 NOTE — PROGRESS NOTES
Pt had an eventful night, unlabored breathing with chest rise symmetrical observed during safety check rounds. No PRNs given or requested this shift, appeared to have slept for 7 hrs, no behavior or safety concerns noted/reported.

## 2023-10-24 NOTE — PLAN OF CARE
BEH IP Unit Acuity Rating Score (UARS)  Patient is given one point for every criteria they meet.    CRITERIA SCORING   On a 72 hour hold, court hold, committed, stay of commitment, or revocation 1/1    Patient LOS on BEH unit exceeds 20 days 0/1  LOS: 11   Patient under guardianship, 55+, otherwise medically complex, or under age 11 0/1   Suicide ideation without relief of precipitating factors 0/1   Current plan for suicide 0/1   Current plan for homicide 0/1   Imminent risk or actual attempt to seriously harm another without relief of factors precipitating the attempt 0/1   Severe dysfunction in daily living (ex: complete neglect for self care, extreme disruption in vegetative function, extreme deterioration in social interactions) 0/1   Recent (last 7 days) or current physical aggression in the ED or on unit 0/1   Restraints or seclusion episode in past 72 hours 0/1   Recent (last 7 days) or current verbal aggression, agitation, yelling, etc., while in the ED or unit 0/1   Active psychosis 1/1   Need for constant or near constant redirection (from leaving, from others, etc).  0/1   Intrusive or disruptive behaviors 0/1   TOTAL 2

## 2023-10-24 NOTE — PROGRESS NOTES
"[facility], [place]   Psychiatric Progress Note        Interim History:     The patient's care was discussed with the treatment team during the daily team meeting and/or staff's chart notes were reviewed.  Staff report patient slept for about 7 hours. He spent most of time in his room/bed, seen outside only for a short time. Was sitting with headphones on, minimal interactions with other patients on the floor or staff members, appeared to be responding to internal stimuli, see RN's note below:    \"Patient was intermittently visible in the milieu, walking in the hallway with headphones and listening to music. Patient was also seen in the lounge watching movies and sports, very isolative and withdrawn, did not interact with peers or staff. Earlier, patient was spent in room laying in bed and talking to self and doing hand gestures,appearing to responding to internal stimuli. Patient was uncooperative with mental health assessment, ignored/refused to answer to most of the questions, denies hallucinations and anxiety. Patient did have a shower and changed to clean clothes. Patient ate dinner snacks, drinks appropriately. Patient reluctantly agreed for VS check at 7.50pm and took all his scheduled HS medication, did not request/receive any prn medication during shift.\"    Met with patient: patient was seen in his room. He said that his mood was OK. He, however, appeared to be irritated when I asked him about Auditory hallucinations: \"why do you say that I am hearing voices\". I told him that many staff members witnessed him talking to self and seeming to attend to internal stimuli. Moe got even more irritated and stated that he was not hallucinating, but rapping and to say about him that he was hallucinating was like falsely blaming someone for something that the above person had never done. He was not receptive when I told him that from my experience as a psychiatrist I learned that patients " "talking to self do respond to hallucinations. Moe told me that he talked to his mother and his CM, was clearer that family and CM want him to go to [facility] and said that he would comply. He had no more questions or concerns.          Medications:      fluticasone  1 spray Both Nostrils BID    fluticasone-vilanterol  1 puff Inhalation Daily    haloperidol  5 mg Oral BID    Or    haloperidol lactate  5 mg Intramuscular BID    haloperidol decanoate  100 mg Intramuscular Q30 Days          Allergies:     Allergies   Allergen Reactions    No Known Drug Allergy           Labs:   No results found for this or any previous visit (from the past 24 hour(s)).       Psychiatric Examination:     /71 (BP Location: Left arm, Patient Position: Sitting)   Pulse 82   Temp 97.6  F (36.4  C) (Temporal)   Resp 16   Ht 1.981 m (6' 6\")   Wt 127.3 kg (280 lb 9.6 oz)   SpO2 99%   BMI 32.43 kg/m    Weight is 280 lbs 9.6 oz  Body mass index is 32.43 kg/m .    Orthostatic Vitals       None           Appearance: awake, alert and dressed in hospital scrubs  Attitude: mostly cooperative  Eye Contact:  fair  Mood:  dysphoric    Affect:  mood congruent, restricted  Speech:  decreased prosody, slow  Psychomotor Behavior:  no evidence of tardive dyskinesia, dystonia, or tics  Throught Process:  linear, concrete  Associations:  tighter  Thought Content:  patient appears to be responding to internal stimuli off and on, but not during our meeting; denies experiencing Auditory hallucinations, See discussion above.   Insight:  partial  Judgement:  limited - improving?  Oriented to:  time, person, and place  Attention Span and Concentration: improving  Recent and Remote Memory:  fair    Clinical Global Impressions  First: 7/4 10/16/2023      Most recent:            Precautions:     Behavioral Orders   Procedures    Assault precautions    Code 1 - Restrict to Unit    Routine Programming     As clinically indicated    Self Injury Precaution    " Status 15     Every 15 minutes.          DIagnoses:     Schizophrenia, schizoaffective, chronic with acute exacerbation          Plan:     Got 100 mg of Haldol Decanoate 10/19/2023. Continues taking 5 mg of Haldol bid. No medication changes today 10/24/2023. Will continue to provide support and structure. Will, likely, go to [facility] as family is uncomfortable taking him home, See discussion above.    Total time spent was 35 minutes. Over 50% of times was spent counseling and coordination of care regarding coping skills, medication and discharge planning.

## 2023-10-25 PROCEDURE — 90832 PSYTX W PT 30 MINUTES: CPT

## 2023-10-25 PROCEDURE — 124N000002 HC R&B MH UMMC

## 2023-10-25 PROCEDURE — H2032 ACTIVITY THERAPY, PER 15 MIN: HCPCS

## 2023-10-25 PROCEDURE — 250N000013 HC RX MED GY IP 250 OP 250 PS 637: Performed by: PSYCHIATRY & NEUROLOGY

## 2023-10-25 PROCEDURE — 99232 SBSQ HOSP IP/OBS MODERATE 35: CPT | Performed by: PSYCHIATRY & NEUROLOGY

## 2023-10-25 RX ORDER — HALOPERIDOL 5 MG/ML
5 INJECTION INTRAMUSCULAR EVERY MORNING
Status: DISCONTINUED | OUTPATIENT
Start: 2023-10-26 | End: 2023-11-07 | Stop reason: HOSPADM

## 2023-10-25 RX ORDER — HALOPERIDOL 5 MG/1
5 TABLET ORAL EVERY MORNING
Status: DISCONTINUED | OUTPATIENT
Start: 2023-10-26 | End: 2023-11-07 | Stop reason: HOSPADM

## 2023-10-25 RX ORDER — HALOPERIDOL 5 MG/ML
5 INJECTION INTRAMUSCULAR AT BEDTIME
Status: DISCONTINUED | OUTPATIENT
Start: 2023-10-25 | End: 2023-10-30

## 2023-10-25 RX ADMIN — FLUTICASONE PROPIONATE 1 SPRAY: 50 SPRAY, METERED NASAL at 09:47

## 2023-10-25 RX ADMIN — HALOPERIDOL 5 MG: 5 TABLET ORAL at 09:46

## 2023-10-25 RX ADMIN — FLUTICASONE PROPIONATE 1 SPRAY: 50 SPRAY, METERED NASAL at 21:23

## 2023-10-25 RX ADMIN — Medication 7.5 MG: at 21:23

## 2023-10-25 RX ADMIN — FLUTICASONE FUROATE AND VILANTEROL TRIFENATATE 1 PUFF: 200; 25 POWDER RESPIRATORY (INHALATION) at 09:46

## 2023-10-25 ASSESSMENT — ACTIVITIES OF DAILY LIVING (ADL)
ADLS_ACUITY_SCORE: 28
DRESS: SCRUBS (BEHAVIORAL HEALTH);INDEPENDENT
ADLS_ACUITY_SCORE: 28
HYGIENE/GROOMING: INDEPENDENT
ADLS_ACUITY_SCORE: 28
ORAL_HYGIENE: INDEPENDENT
ADLS_ACUITY_SCORE: 28
LAUNDRY: WITH SUPERVISION

## 2023-10-25 NOTE — PLAN OF CARE
BEH IP Unit Acuity Rating Score (UARS)  Patient is given one point for every criteria they meet.    CRITERIA SCORING   On a 72 hour hold, court hold, committed, stay of commitment, or revocation 1/1    Patient LOS on BEH unit exceeds 20 days 0/1  LOS: 12   Patient under guardianship, 55+, otherwise medically complex, or under age 11 0/1   Suicide ideation without relief of precipitating factors 0/1   Current plan for suicide 0/1   Current plan for homicide 0/1   Imminent risk or actual attempt to seriously harm another without relief of factors precipitating the attempt 0/1   Severe dysfunction in daily living (ex: complete neglect for self care, extreme disruption in vegetative function, extreme deterioration in social interactions) 0/1   Recent (last 7 days) or current physical aggression in the ED or on unit 0/1   Restraints or seclusion episode in past 72 hours 0/1   Recent (last 7 days) or current verbal aggression, agitation, yelling, etc., while in the ED or unit 0/1   Active psychosis 1/1   Need for constant or near constant redirection (from leaving, from others, etc).  0/1   Intrusive or disruptive behaviors 0/1   TOTAL 2

## 2023-10-25 NOTE — PROGRESS NOTES
"[facility], [place]   Psychiatric Progress Note        Interim History:     The patient's care was discussed with the treatment team during the daily team meeting and/or staff's chart notes were reviewed.  Staff report patient slept for about 6.5 hours last night. He woke up couple of times with complaints of neck pain, got hot pack. Yesterday he also requested to wear his own clothes, they were examined and all had strings in them. Was compliant with meds, ate OK. Continued to deny psychotic symptoms. Per CTC who talked to CM, CM doesn't believe that Moe is at his baseline and thinks that he is minimizing his symptoms. Per RN's report:    \"Patient continues to be dismissive with assessment questions. Patient was more talkative and laughing with writer once he learned that writer could speak Swahili language. Patient is otherwise pleasant, polite and cooperative with unit rules. pt denies pain, SI, HI, and hallucinations,and does not appear to be responding to internal stimuli. No behaviors noted.\"    Met with patient: patient was seen in his room. He was sleeping with headphones on, but woke up and talked to me. Said that his mood was so so and that there was nothing to do at this hospital and this is why he spent a lot of time in sleep. He asked me again about home clothes. I confirmed that clothes with strings were not allowed and explained why. Moe seemed to be somewhat receptive and didn't insist. Said that he was aware that we were looking for a temporary place for him (he called [facility] \"my group home\"). He again denied presence of Auditory hallucinations or Visual hallucinations, didn't voice delusional ideas. I told him that we would like to increase his evening haldol. He agreed and had no more questions or concerns. Didn't say anything when I encouraged him to spend more time in groups.         Medications:      fluticasone  1 spray Both Nostrils BID    fluticasone-vilanterol  " "1 puff Inhalation Daily    haloperidol  7.5 mg Oral At Bedtime    Or    haloperidol lactate  5 mg Intramuscular At Bedtime    [START ON 10/26/2023] haloperidol  5 mg Oral QAM    Or    [START ON 10/26/2023] haloperidol lactate  5 mg Intramuscular QAM    haloperidol decanoate  100 mg Intramuscular Q30 Days          Allergies:     Allergies   Allergen Reactions    No Known Drug Allergy           Labs:   No results found for this or any previous visit (from the past 24 hour(s)).       Psychiatric Examination:     /76   Pulse 85   Temp 97.6  F (36.4  C) (Temporal)   Resp 16   Ht 1.981 m (6' 6\")   Wt 127.3 kg (280 lb 9.6 oz)   SpO2 100%   BMI 32.43 kg/m    Weight is 280 lbs 9.6 oz  Body mass index is 32.43 kg/m .    Orthostatic Vitals       None           Appearance: awake, alert and dressed in hospital scrubs  Attitude: mostly cooperative  Eye Contact:  fair  Mood:  dysphoric    Affect:  mood congruent, restricted  Speech:  decreased prosody, slow  Psychomotor Behavior:  no evidence of tardive dyskinesia, dystonia, or tics  Throught Process:  linear, concrete  Associations:  tighter  Thought Content:  patient appears to be responding to internal stimuli off and on, but not during our meeting; denies experiencing Auditory hallucinations, See discussion above.   Insight:  partial  Judgement:  limited    Oriented to:  time, person, and place  Attention Span and Concentration: improving  Recent and Remote Memory:  fair    Clinical Global Impressions  First: 7/4 10/16/2023      Most recent:            Precautions:     Behavioral Orders   Procedures    Assault precautions    Code 1 - Restrict to Unit    Routine Programming     As clinically indicated    Self Injury Precaution    Status 15     Every 15 minutes.          DIagnoses:     Schizophrenia, schizoaffective, chronic with acute exacerbation          Plan:     Got 100 mg of Haldol Decanoate 10/19/2023. Will increase oral haldol dose to 5 mg qam and 7.5 mg qhs " backed up by Haldol IM 10/25/2023. Will continue to provide support and structure. Will, likely, go to [facility] as family is uncomfortable taking him home, See discussion above.    Total time spent was 35 minutes. Over 50% of times was spent counseling and coordination of care regarding coping skills, medication and discharge planning.

## 2023-10-25 NOTE — PLAN OF CARE
Problem: Adult Behavioral Health Plan of Care  Goal: Optimized Coping Skills in Response to Life Stressors  Outcome: Progressing   Goal Outcome Evaluation:               Pt. Spent the morning resting in bed, easily awoken for scheduled medication, refused breakfast. Hypoactive all morning quiet and withdrawn, refused all groups including community meeting, speech is clear and coherent. Thought process is logical with periods of looking preoccupied, by starring blankly. Pt visible in the lounge for lunch, social with peers  talking and laughing loudly amongst each other,  dismissive with assessment questions, reporting that he's fine, denies anxiety and depression, denies pain and all other psych symptoms, medication compliance and contracted for safety. Resistant to share information continues to appear annoyed with questions. Calm with no behaviors all shift.

## 2023-10-25 NOTE — PLAN OF CARE
Team Note Due:  Monday    Assessment/Intervention/Current Symtoms and Care Coordination:  Chart review and met with team, discussed pt progress, symptomology, and response to treatment.  Discussed the discharge plan and any potential impediments to discharge.     Discharge Plan or Goal:  Current plan is to stabilize symptoms and develop safe disposition plan. Following hospitalization, plan is for pt to go to [facility].      Barriers to Discharge:  Pt continues to respond to internal stimuli and is not at baseline, per pt's .     Referral Status:  Pt has psychiatrist in the community and is receiving home health services for support with injections and medication management. Follow-up psychiatry appointment has been scheduled - AVS updated.     Legal Status:  MI Commitment and Bolanos Order (PD revoked)   County: [facility]  File Number: [id number]  Start and expiration date of commitment: Start date is 9/28/2023   Bolanos Ordered Medications: Zyprexa, Haldol, Risperdal, Invega, Clozaril, Thorazine    Contacts:  Medication Management/Psychiatry:  Name/Clinic: Dr. Felipe Zamorano [facility]   Phone: [phone]    [facility] on 10/6/2023 Nicola Carias RN      /ACT Team:  Name: Maryam Miranda  Phone: [phone]      Family Contacts:   Name: Nydenia Nyadrianarobyn - mother (BUTCH on file)  Phone: [phone]    Name: Jostindioni Doe - father (BUTCH on file)  Phone: [phone]     Upcoming Meetings and Dates/Important Information and next steps:  None

## 2023-10-25 NOTE — PROGRESS NOTES
"Goal Outcome Evaluation:    Adult Inpatient Safety Plan:     [facility] Adult Inpatient Mental Health Units           [address]                                Moe Doe     SAFETY PLAN:  Step 1: Warning signs / cues (Thoughts, images, mood, situation, behavior) that a crisis may be developing:  Thoughts:  NA  Images:  NA  Thinking Processes:  NA  Mood: intense anger, agitation, disinhibited (not caring about things or consequences), and \"violent, fear, threatens\"  Behaviors: isolating/withdrawing , sleeping too much, and eat less  Situations:  NA      Step 2: Coping strategies - Things I can do to take my mind off of my problems without contacting another person (relaxation technique, physical activity):  Distress Tolerance Strategies:   \"communicating, TV , you tube videos, drink champs video/ podcast  Physical Activities:  NA  Focus on helpful thoughts:   NA    Step 3: Remind myself of people and things that are important to me and worth living for:    family  My peace  friends    Step 4: When I am in crisis, I can ask these people to help me use my safety plan:   Name: Alonso Wiley Phone: [phone]   Name: Sister Shabana  Phone: ?   Name: Brother Brook  Phone: ?    Step 5: Making the environment safe:   I will remove alcohol and drugs, secure my medications, dispose of old medications, remove access to firearms, remove things I could use to hurt myself, and identify supportive people  Other ways to make my environment safe: \" give me space, have visits home\"    Step 6: Professionals or agencies I can contact during a crisis:  [facility] [phone]  Crisis text line: Text \"[place]\" to 320441. Free, confidential, 24/7.  988 Lifeline      If unable to maintain safety despite working your plan, call 911 or go to my nearest emergency department.         Patient helped develop this safety plan and agreed to use it when needed.  Pt has been given a copy of this plan.          Today s date:  " October 25, 2023  Completed by Clinician Name/ Credentials:  Jerome Marquez, LMFT- ATR-BC  Licensed Marriage and Family Therapist and   Registered and Board Certified Art Therapist          Adapted from Safety Plan Template 2008 Rafia Berrios and Ace Gustafson is reprinted with the express permission of the authors.  No portion of the Safety Plan Template may be reproduced without the express, written permission.  You can contact the authors at [email or web address] or [email or web address].

## 2023-10-25 NOTE — PROGRESS NOTES
Pt was sleeping at the beginning of the shift, breathing quietely and unlabored. Pt woke up X 2 requested hot pack for neck pain, denied Tylenol. Appears to have slept for 6 hrs, no behaviors or safety concerns reported/noted.

## 2023-10-26 PROCEDURE — 250N000013 HC RX MED GY IP 250 OP 250 PS 637: Performed by: PSYCHIATRY & NEUROLOGY

## 2023-10-26 PROCEDURE — 124N000002 HC R&B MH UMMC

## 2023-10-26 PROCEDURE — 99232 SBSQ HOSP IP/OBS MODERATE 35: CPT | Performed by: PSYCHIATRY & NEUROLOGY

## 2023-10-26 RX ADMIN — FLUTICASONE PROPIONATE 1 SPRAY: 50 SPRAY, METERED NASAL at 20:57

## 2023-10-26 RX ADMIN — FLUTICASONE FUROATE AND VILANTEROL TRIFENATATE 1 PUFF: 200; 25 POWDER RESPIRATORY (INHALATION) at 10:12

## 2023-10-26 RX ADMIN — Medication 7.5 MG: at 20:58

## 2023-10-26 RX ADMIN — HALOPERIDOL 5 MG: 5 TABLET ORAL at 10:12

## 2023-10-26 RX ADMIN — FLUTICASONE PROPIONATE 1 SPRAY: 50 SPRAY, METERED NASAL at 10:12

## 2023-10-26 ASSESSMENT — ACTIVITIES OF DAILY LIVING (ADL)
ADLS_ACUITY_SCORE: 28
ADLS_ACUITY_SCORE: 28
HYGIENE/GROOMING: INDEPENDENT
ADLS_ACUITY_SCORE: 28
ADLS_ACUITY_SCORE: 28
DRESS: INDEPENDENT
HYGIENE/GROOMING: INDEPENDENT
ADLS_ACUITY_SCORE: 28
ORAL_HYGIENE: INDEPENDENT
LAUNDRY: WITH SUPERVISION
DRESS: SCRUBS (BEHAVIORAL HEALTH)
ADLS_ACUITY_SCORE: 28
LAUNDRY: WITH SUPERVISION
ORAL_HYGIENE: INDEPENDENT
ADLS_ACUITY_SCORE: 28

## 2023-10-26 NOTE — PLAN OF CARE
Night Nursing Note  10/25/23 -10/26/23        Moe was in bed at beginning of shift and appeared asleep.  Monitored q15 mins for safety.  Appeared to sleep majority of night without difficulty.  Continue to monitor, offer support and reassurance per care plan.    Slept 6 hrs.

## 2023-10-26 NOTE — PROGRESS NOTES
"[facility], [place]   Psychiatric Progress Note        Interim History:     The patient's care was discussed with the treatment team during the daily team meeting and/or staff's chart notes were reviewed.  Staff report patient slept for about 6 hours and napped somewhat during the day. He took increased dose of oral Haldol with no complaints and denied having any immediate side effects. Went to some groups, but, overall, had limited interactions with peers and staff members. Met with [facility] who discussed with him conversation with CM and plan to start referral processes, see [facility]'s note below:    \"Called pt  to provide update and shared recent medication change with her. Discussed referrals to IRTS starting next week and Maryam was in agreement with this plan.   - Met with pt to discuss IRTS referrals for next week. Pt reported he was happy to hear that this process would be starting. Received general BUTCH for referrals\".    Met with patient: patient was seen in his room. He was somnolent, but talked to me. Said that he had been spending more time in his room/bed: \"because there is nothing else to do here\". Confirmed that he took his meds. As he tended to get agitated when asked about Auditory hallucinations, I asked him if he had any unusual experiences with him or around him lately. Moe said that he didn't. He asked about discharge planning (it was before his visit with [facility]), I referred him to talk to [facility]. He had no more questions or concerns.          Medications:      fluticasone  1 spray Both Nostrils BID    fluticasone-vilanterol  1 puff Inhalation Daily    haloperidol  7.5 mg Oral At Bedtime    Or    haloperidol lactate  5 mg Intramuscular At Bedtime    haloperidol  5 mg Oral QAM    Or    haloperidol lactate  5 mg Intramuscular QAM    haloperidol decanoate  100 mg Intramuscular Q30 Days          Allergies:     Allergies   Allergen Reactions    No Known Drug Allergy  " "         Labs:   No results found for this or any previous visit (from the past 24 hour(s)).       Psychiatric Examination:     /78   Pulse 69   Temp 98.4  F (36.9  C) (Temporal)   Resp 16   Ht 1.981 m (6' 6\")   Wt 127.3 kg (280 lb 9.6 oz)   SpO2 100%   BMI 32.43 kg/m    Weight is 280 lbs 9.6 oz  Body mass index is 32.43 kg/m .    Orthostatic Vitals       None           Appearance: awake, alert and dressed in hospital scrubs  Attitude: mostly cooperative  Eye Contact:  downcast today  Mood:  dysphoric    Affect:  mood congruent, restricted  Speech:  decreased prosody, slow  Psychomotor Behavior:  no evidence of tardive dyskinesia, dystonia, or tics  Throught Process:  linear, concrete  Associations:  tighter  Thought Content:  patient appears to be responding to internal stimuli off and on, but not during our meeting; denies experiencing Auditory hallucinations, See discussion above.   Insight:  partial  Judgement: impaired  Oriented to:  time, person, and place  Attention Span and Concentration: improving  Recent and Remote Memory:  fair    Clinical Global Impressions  First: 7/4 10/16/2023      Most recent:            Precautions:     Behavioral Orders   Procedures    Assault precautions    Code 1 - Restrict to Unit    Routine Programming     As clinically indicated    Self Injury Precaution    Status 15     Every 15 minutes.          DIagnoses:     Schizophrenia, schizoaffective, chronic with acute exacerbation          Plan:     Got 100 mg of Haldol Decanoate 10/19/2023. Will continue increased oral haldol dose to 5 mg qam and 7.5 mg qhs backed up by Haldol IM. No medication changes today 10/26/2023. Will continue to provide support and structure. Will, likely, go to [facility] as family is uncomfortable taking him home, See discussion above.    Total time spent was 35 minutes. Over 50% of times was spent counseling and coordination of care regarding coping skills, medication and discharge planning.     "

## 2023-10-26 NOTE — PLAN OF CARE
BEH IP Unit Acuity Rating Score (UARS)  Patient is given one point for every criteria they meet.    CRITERIA SCORING   On a 72 hour hold, court hold, committed, stay of commitment, or revocation 1/1    Patient LOS on BEH unit exceeds 20 days 0/1  LOS: 13   Patient under guardianship, 55+, otherwise medically complex, or under age 11 0/1   Suicide ideation without relief of precipitating factors 0/1   Current plan for suicide 0/1   Current plan for homicide 0/1   Imminent risk or actual attempt to seriously harm another without relief of factors precipitating the attempt 0/1   Severe dysfunction in daily living (ex: complete neglect for self care, extreme disruption in vegetative function, extreme deterioration in social interactions) 0/1   Recent (last 7 days) or current physical aggression in the ED or on unit 0/1   Restraints or seclusion episode in past 72 hours 0/1   Recent (last 7 days) or current verbal aggression, agitation, yelling, etc., while in the ED or unit 0/1   Active psychosis 1/1   Need for constant or near constant redirection (from leaving, from others, etc).  0/1   Intrusive or disruptive behaviors 0/1   TOTAL 2

## 2023-10-26 NOTE — PLAN OF CARE
"Goal Outcome Evaluation:    Patient observed in the lounge with his headphones on watching TV. Patient is pleasant on approach, attended group and when asked the assessment questions he responds \"I'm good\". Patient showered this shift, ate well and he is med compliant. Patient's only concerned was regarding the Haldol increase and once it was explained to him, he was okay with taking it. Patient verbally contracts for safety and offers no other concerns.Pt demonstrates ability to communicate needs to staff. pt did not appear to be responding to internal stimuli. No behaviors noted. Will continue to monitor.    Vitals./78   Pulse 69   Temp 98.4  F (36.9  C) (Temporal)   Resp 16   Ht 1.981 m (6' 6\")   Wt 127.3 kg (280 lb 9.6 oz)   SpO2 100%   BMI 32.43 kg/m         "

## 2023-10-26 NOTE — CARE PLAN
10/25/23 1900   Group Therapy Session   Group Attendance attended group session   Time Session Began 1745   Time Session Ended 1800   Total Time (minutes) 45   Total # Attendees 6   Group Type expressive therapy   Group Topic Covered emotions/expression   Patient Response/Contribution cooperative with task     Art Therapy directive is to create poetry as a tool for self expression. Pt was engaged in creative writing prompt for 45 minutes this evening.  Goals of directive: emotional expression, emotional regulation, media exploration.  Pt was an engaged participant, initially needed additional explanation on how to create a poem from materials given. Pt finished black-out poetry and briefly shared with author and group. Pts mood was calm, pleasant participant.

## 2023-10-26 NOTE — PLAN OF CARE
Team Note Due:  Monday    Assessment/Intervention/Current Symtoms and Care Coordination:  Chart review and met with team, discussed pt progress, symptomology, and response to treatment.  Discussed the discharge plan and any potential impediments to discharge.    Tasks Completed:  - Called pt  to provide update and shared recent medication change with her. Discussed referrals to IRTS starting next week and Maryam was in agreement with this plan.   - Met with pt to discuss IRTS referrals for next week. Pt reported he was happy to hear that this process would be starting. Received general BUTCH for referrals.     Discharge Plan or Goal:  Current plan is to stabilize symptoms and develop safe disposition plan. Following hospitalization, plan is for pt to go to IRTS.      Barriers to Discharge:  Pt continues to respond to internal stimuli and is not at baseline, per pt's .     Referral Status:  Pt has psychiatrist in the community and is receiving home health services for support with injections and medication management. Follow-up psychiatry appointment has been scheduled - AVS updated.     Legal Status:  MI Commitment and Bolanos Order (PD revoked)   County: [facility]  File Number: [id number]  Start and expiration date of commitment: Start date is 9/28/2023   Bolanos Ordered Medications: Zyprexa, Haldol, Risperdal, Invega, Clozaril, Thorazine    Contacts:  Medication Management/Psychiatry:  Name/Clinic: Dr. Felipe Zamorano [facility]   Phone: [phone]    [facility] on 10/6/2023 Nicola Carias RN      /ACT Team:  Name: Maryam Miranda  Email: [email or web address]  Phone: [phone]      Family Contacts:   Name: Angeles Rosales - mother (BUTCH on file)  Phone: [phone]    Name: Jostin Doe - father (BUTCH on file)  Phone: [phone]     Upcoming Meetings and Dates/Important Information and next steps:  None

## 2023-10-26 NOTE — PLAN OF CARE
Problem: Adult Behavioral Health Plan of Care  Goal: Develops/Participates in Therapeutic Starbuck to Support Successful Transition  Outcome: Not Progressing  Intervention: Foster Therapeutic Starbuck  Recent Flowsheet Documentation  Taken 10/26/2023 1159 by Nicola Ziegler, RN  Trust Relationship/Rapport:   care explained   choices provided   emotional support provided   questions encouraged   Goal Outcome Evaluation:    Plan of Care Reviewed With: patient      Nursing Assessment    Recent Vitals: B/P:  T:  P:    General Shift Summary  Pt was isolative to room for the majority of the shift. Pt came out towards the end of the shift and sat quietly in the lounge area. Pt is not outwardly responding although may be preoccupied. No delusional statements made. PT denying mh sx and any questions or concerns, presents as guarded but polite.     Patient is medication compliant and reported no side effects. Pt used several sprays of flonase nasal spray and attempted to take multiple puffs from 1-puff inhaler. Education provided and pt states understanding. No PRN medications given this shift.     Hygiene and appetite are adequate.       Nicola Ziegler, RN

## 2023-10-27 PROCEDURE — 250N000013 HC RX MED GY IP 250 OP 250 PS 637: Performed by: PSYCHIATRY & NEUROLOGY

## 2023-10-27 PROCEDURE — 124N000002 HC R&B MH UMMC

## 2023-10-27 PROCEDURE — 99232 SBSQ HOSP IP/OBS MODERATE 35: CPT | Performed by: PSYCHIATRY & NEUROLOGY

## 2023-10-27 RX ADMIN — FLUTICASONE PROPIONATE 1 SPRAY: 50 SPRAY, METERED NASAL at 21:19

## 2023-10-27 RX ADMIN — FLUTICASONE PROPIONATE 1 SPRAY: 50 SPRAY, METERED NASAL at 08:53

## 2023-10-27 RX ADMIN — Medication 7.5 MG: at 21:14

## 2023-10-27 RX ADMIN — HALOPERIDOL 5 MG: 5 TABLET ORAL at 08:53

## 2023-10-27 RX ADMIN — FLUTICASONE FUROATE AND VILANTEROL TRIFENATATE 1 PUFF: 200; 25 POWDER RESPIRATORY (INHALATION) at 10:23

## 2023-10-27 ASSESSMENT — ACTIVITIES OF DAILY LIVING (ADL)
ADLS_ACUITY_SCORE: 28
LAUNDRY: WITH SUPERVISION
ADLS_ACUITY_SCORE: 28
HYGIENE/GROOMING: INDEPENDENT
ORAL_HYGIENE: INDEPENDENT
ADLS_ACUITY_SCORE: 28
DRESS: INDEPENDENT

## 2023-10-27 NOTE — PLAN OF CARE
Night Nursing Note  10/26/23 - 1027/23      Moe was in bed at beginning of shift and appeared asleep.  Monitored q15 mins for safety.  Appeared to sleep majority of  night without difficulty.  Continue to monitor, offer support and reassurance per care plan.    Slept 7 hrs.

## 2023-10-27 NOTE — PLAN OF CARE
"Pt had a good shift overall. He reported that he historically has AH, but currently denies or reported minimal AH. He said the \"medications are helping\" and that he is ready for discharge. He said he is ready to discharge to the next level of care. He was visible in milieu but largely kept to himself, and he did not appear to be responding to internal stimuli nor AH. 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 He was pleased to learn about Lore Banuelos and Dipti Lundberg. He otherwise denies all SPMI sx and SI. Will continue to monitor and encourage participation.     Problem: Adult Behavioral Health Plan of Care  Goal: Plan of Care Review  Outcome: Progressing  Flowsheets (Taken 10/26/2023 2107)  Plan of Care Reviewed With: patient  Patient Agreement with Plan of Care: agrees   Goal Outcome Evaluation:      Plan of Care Reviewed With: patient                 "

## 2023-10-27 NOTE — PLAN OF CARE
Problem: Psychotic Signs/Symptoms  Goal: Increased Participation and Engagement (Psychotic Signs/Symptoms)  Outcome: Not Progressing  Intervention: Facilitate Participation and Engagement  Recent Flowsheet Documentation  Taken 10/27/2023 1000 by Vero Perez RN  Diversional Activity:   music   television    Pt was mostly isolative to his room this shift, resting in bed and listening to headphones. Pt was withdrawn, minimal responses to questions. He ate meals and was compliant with medications. Affect was blunted, flat. Mood was somber, facial expression relaxed. He denies hallucinations and SI/SIB. Will continue to monitor.

## 2023-10-27 NOTE — PLAN OF CARE
BEH IP Unit Acuity Rating Score (UARS)  Patient is given one point for every criteria they meet.    CRITERIA SCORING   On a 72 hour hold, court hold, committed, stay of commitment, or revocation 1/1    Patient LOS on BEH unit exceeds 20 days 0/1  LOS: 14   Patient under guardianship, 55+, otherwise medically complex, or under age 11 0/1   Suicide ideation without relief of precipitating factors 0/1   Current plan for suicide 0/1   Current plan for homicide 0/1   Imminent risk or actual attempt to seriously harm another without relief of factors precipitating the attempt 0/1   Severe dysfunction in daily living (ex: complete neglect for self care, extreme disruption in vegetative function, extreme deterioration in social interactions) 0/1   Recent (last 7 days) or current physical aggression in the ED or on unit 0/1   Restraints or seclusion episode in past 72 hours 0/1   Recent (last 7 days) or current verbal aggression, agitation, yelling, etc., while in the ED or unit 0/1   Active psychosis 1/1   Need for constant or near constant redirection (from leaving, from others, etc).  0/1   Intrusive or disruptive behaviors 0/1   TOTAL 2

## 2023-10-27 NOTE — PLAN OF CARE
Team Note Due:  Monday    Assessment/Intervention/Current Symtoms and Care Coordination:  Chart review and met with team, discussed pt progress, symptomology, and response to treatment.  Discussed the discharge plan and any potential impediments to discharge.    Tasks Completed:  - Submitted IRTS referrals (see below).  - [facility] called to confirm receipt of referral. Answered questions regarding commitment and Bolanos. [facility] requested paperwork and CTC reported this paperwork can be shared if/when we move forward with admission process. [facility] has openings coming up but could be within 2-3 weeks.     Discharge Plan or Goal:  Current plan is to stabilize symptoms and develop safe disposition plan. Following hospitalization, plan is for pt to go to IRTS.      Barriers to Discharge:  At times, pt appears to respond to internal stimuli. Pt denies auditory hallucinations and reports symptoms have improved.     Referral Status:  Pt has psychiatrist in the community and is receiving home health services for support with injections and medication management. Follow-up psychiatry appointment has been scheduled - AVS updated.    CTC made referrals to the following IRTS programs:  [facility] Central Intake: Submitted 10/27/2023  Contact: Pilar Spain,   Direct dial: [phone]/fax [phone]  Direct email: [email or web address]  General Email: [email or web address]  Includes:   [facility]  [facility]  Trans[facility] on [place]  [facility]  Transfer Home  [facility]  [facility]: Submitted 10/27/2023  [facility]: [phone]  Fax: [phone]    [facility]: Submitted 10/27/2023  Phone: [phone]   Fax: [phone]     [facility]: Submitted 10/27/2023  Phone: [phone]    Fax: [phone]    email: [email or web address]  10/27/2023: Confirmed receipt of referral. No current openings but may have some coming up. Estimated between 2-3 weeks. Civil commitment paperwork can be shared once further in the admission process.     Legal Status:  MI Commitment and Bolanos Order (PD revoked)   County: [facility]  File Number: [id number]  Start and expiration date of commitment: Start date is 9/28/2023   Bolanos Ordered Medications: Zyprexa, Haldol, Risperdal, Invega, Clozaril, Thorazine    Contacts:  Medication Management/Psychiatry:  Name/Clinic: Dr. Felipe Zamorano [facility]   Phone: [phone]    [facility] on 10/6/2023 Nicola Carias RN      /ACT Team:  Name: Maryam Miranda  Email: [email or web address]  Phone: [phone]      Family Contacts:   Name: Angeles Rosales - mother (BUTCH on file)  Phone: [phone]    Name: Jostin Doe - father (BUTCH on file)  Phone: [phone]     Upcoming Meetings and Dates/Important Information and next steps:  None

## 2023-10-27 NOTE — PROGRESS NOTES
"[facility], [place]   Psychiatric Progress Note        Interim History:     The patient's care was discussed with the treatment team during the daily team meeting and/or staff's chart notes were reviewed.  Staff report patient slept for about 7 hours and napped somewhat during the day. Was compliant with meds, denied having side effects. Spent more time outside in the afternoon. There have been no recent reports about Gock attending to internal stimuli. Shared with RN that he used to have Auditory hallucinations, but with help of meds he had not been hearing them lately, see RN's note below:    \"Pt had a good shift overall. He reported that he historically has AH, but currently denies or reported minimal AH. He said the \"medications are helping\" and that he is ready for discharge. He said he is ready to discharge to the next level of care. He was visible in milieu but largely kept to himself, and he did not appear to be responding to internal stimuli nor AH. 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 He was pleased to learn about Lore Banuelos and Dipti Lundberg. He otherwise denies all SPMI sx and SI.\"     Met with patient: patient was seen in his room. He was in his bed, but not asleep. Said that he was resting because \"there is nothing else to do here\". Said that he was glad that process of making IRTS referrals was started. Confirmed that he had not been hearing Auditory hallucinations anymore: \"meds are helping\". When I asked him when he heard Auditory hallucinations last time, he with some difficulties recalled that it was before his admission? Appeared to be slightly more enthusiastic while talking but still had blunted affect. Said that he had no more questions or concerns. Wished me to have good weekend.         Medications:      fluticasone  1 spray Both Nostrils BID    " "fluticasone-vilanterol  1 puff Inhalation Daily    haloperidol  7.5 mg Oral At Bedtime    Or    haloperidol lactate  5 mg Intramuscular At Bedtime    haloperidol  5 mg Oral QAM    Or    haloperidol lactate  5 mg Intramuscular QAM    haloperidol decanoate  100 mg Intramuscular Q30 Days          Allergies:     Allergies   Allergen Reactions    No Known Drug Allergy           Labs:   No results found for this or any previous visit (from the past 24 hour(s)).       Psychiatric Examination:     /64 (Patient Position: Sitting, Cuff Size: Adult Large)   Pulse 86   Temp 98.2  F (36.8  C) (Oral)   Resp 16   Ht 1.981 m (6' 6\")   Wt 127.3 kg (280 lb 9.6 oz)   SpO2 99%   BMI 32.43 kg/m    Weight is 280 lbs 9.6 oz  Body mass index is 32.43 kg/m .    Orthostatic Vitals       None           Appearance: awake, alert and dressed in hospital scrubs  Attitude: more cooperative  Eye Contact:  partial  Mood:  dysphoric    Affect:  mood congruent, restricted  Speech:  decreased prosody, slow  Psychomotor Behavior:  no evidence of tardive dyskinesia, dystonia, or tics  Throught Process:  linear, concrete  Associations:  tighter  Thought Content:  patient appears to be less responding to internal stimuli (it was not reported about him recently), denies experiencing Auditory hallucinations, See discussion above.   Insight:  partial  Judgement: impaired - improving?  Oriented to:  time, person, and place  Attention Span and Concentration: improving  Recent and Remote Memory:  fair    Clinical Global Impressions  First: 7/4 10/16/2023      Most recent:            Precautions:     Behavioral Orders   Procedures    Assault precautions    Code 1 - Restrict to Unit    Routine Programming     As clinically indicated    Self Injury Precaution    Status 15     Every 15 minutes.          DIagnoses:     Schizophrenia, schizoaffective, chronic with acute exacerbation          Plan:     Got 100 mg of Haldol Decanoate 10/19/2023. Will " continue increased oral haldol dose to 5 mg qam and 7.5 mg qhs backed up by Haldol IM. No medication changes today 10/27/2023. Will continue to provide support and structure. Will go to IRTS as family is uncomfortable taking him home, referrals started - See discussion above. Will for now keep on No roommate order.    Total time spent was 35 minutes. Over 50% of times was spent counseling and coordination of care regarding coping skills, medication and discharge planning.

## 2023-10-28 PROCEDURE — 250N000013 HC RX MED GY IP 250 OP 250 PS 637: Performed by: PSYCHIATRY & NEUROLOGY

## 2023-10-28 PROCEDURE — 124N000002 HC R&B MH UMMC

## 2023-10-28 RX ADMIN — Medication 7.5 MG: at 20:16

## 2023-10-28 RX ADMIN — FLUTICASONE FUROATE AND VILANTEROL TRIFENATATE 1 PUFF: 200; 25 POWDER RESPIRATORY (INHALATION) at 09:20

## 2023-10-28 RX ADMIN — HALOPERIDOL 5 MG: 5 TABLET ORAL at 09:20

## 2023-10-28 RX ADMIN — FLUTICASONE PROPIONATE 1 SPRAY: 50 SPRAY, METERED NASAL at 09:20

## 2023-10-28 RX ADMIN — FLUTICASONE PROPIONATE 1 SPRAY: 50 SPRAY, METERED NASAL at 20:16

## 2023-10-28 ASSESSMENT — ACTIVITIES OF DAILY LIVING (ADL)
ADLS_ACUITY_SCORE: 28
LAUNDRY: UNABLE TO COMPLETE
ADLS_ACUITY_SCORE: 28
DRESS: INDEPENDENT
HYGIENE/GROOMING: HANDWASHING;SHOWER;INDEPENDENT
ADLS_ACUITY_SCORE: 28
DRESS: SCRUBS (BEHAVIORAL HEALTH);INDEPENDENT
ADLS_ACUITY_SCORE: 28
HYGIENE/GROOMING: INDEPENDENT
ADLS_ACUITY_SCORE: 28
ORAL_HYGIENE: INDEPENDENT
LAUNDRY: WITH SUPERVISION
ORAL_HYGIENE: INDEPENDENT

## 2023-10-28 NOTE — PLAN OF CARE
Goal Outcome Evaluation:    Plan of Care Reviewed With: patient        Pt spent most of the shift resting in his room in bed. Pt was awakened for breakfast but refused. Pt was medication compliant this morning.  Pt reported feeling tired this morning and just wanting to sleep. Writer observed eyes tearing, but when asked if he was feeling sad, pt denied.  Pt requested that writer wake him up for lunch. Staff woke pt for lunch and pt initially refused.  Pt got up for lunch with a little coaxing. Pt ate 100% of lunch. Pt was isolative and returned to room soon after finishing his lunch. Pt denied any suicidal thoughts.  Did not respond to question about voices, pt reported feeling tired and just wanting to rest. Pt wondering if medication making him tired.

## 2023-10-28 NOTE — PLAN OF CARE
Goal Outcome Evaluation:    Problem: Adult Behavioral Health Plan of Care  Goal: Plan of Care Review  Outcome: Progressing     Problem: Psychotic Signs/Symptoms  Goal: Improved Behavioral Control (Psychotic Signs/Symptoms)  Outcome: Progressing     Patient was visible in milieu, presented with flat and blunted  affect. Pt kept to himself, not social with peers or staff. Pt observed talking himself, appeared to be responding to internal stimuli, pretending having tea party. Denied all symptoms, including SI. Pt sister and mom visited this evening, they brought some snacks and cloths for him. Pt is eating and drinking adequately. No prn given this shift. Medication complaint

## 2023-10-28 NOTE — PLAN OF CARE
"                                                       Night Nursing Note   10/27/23 - 10/28/23        Moe was awake in bed at beginning of shift listening to headphones.  Also noted [id number] safety round checks, he was talking and laughing out  loud to self.  Monitored q15 mins for safety.    This writer asked if we could assist him.  Stated he would like some milk and   would come out to get it.  While in lounge appeared to be responding to internal stimuli, and paranoid.  When offered milk, pointed to table and said \"put it down.\"  After milk, returned back to room and went to bed.  Offered no complaints. Remain behaviorally controlled.    Appeared to sleep majority of night without difficulty.  Continue to monitor, offer support and reassurance per care plan.    Slept 5.5 hrs.                            "

## 2023-10-29 PROCEDURE — 250N000013 HC RX MED GY IP 250 OP 250 PS 637: Performed by: PSYCHIATRY & NEUROLOGY

## 2023-10-29 PROCEDURE — 124N000002 HC R&B MH UMMC

## 2023-10-29 RX ADMIN — FLUTICASONE PROPIONATE 1 SPRAY: 50 SPRAY, METERED NASAL at 20:08

## 2023-10-29 RX ADMIN — Medication 7.5 MG: at 20:07

## 2023-10-29 RX ADMIN — FLUTICASONE FUROATE AND VILANTEROL TRIFENATATE 1 PUFF: 200; 25 POWDER RESPIRATORY (INHALATION) at 08:43

## 2023-10-29 RX ADMIN — HALOPERIDOL 5 MG: 5 TABLET ORAL at 08:43

## 2023-10-29 RX ADMIN — FLUTICASONE PROPIONATE 1 SPRAY: 50 SPRAY, METERED NASAL at 08:44

## 2023-10-29 ASSESSMENT — ACTIVITIES OF DAILY LIVING (ADL)
ADLS_ACUITY_SCORE: 28
DRESS: STREET CLOTHES;SCRUBS (BEHAVIORAL HEALTH);INDEPENDENT
ADLS_ACUITY_SCORE: 28
HYGIENE/GROOMING: HANDWASHING;SHOWER;INDEPENDENT
ADLS_ACUITY_SCORE: 28
ADLS_ACUITY_SCORE: 28
ORAL_HYGIENE: INDEPENDENT
DRESS: INDEPENDENT
ADLS_ACUITY_SCORE: 28
HYGIENE/GROOMING: INDEPENDENT
ADLS_ACUITY_SCORE: 28
ADLS_ACUITY_SCORE: 28
ORAL_HYGIENE: INDEPENDENT
ADLS_ACUITY_SCORE: 28
LAUNDRY: UNABLE TO COMPLETE
ADLS_ACUITY_SCORE: 28
ADLS_ACUITY_SCORE: 28
LAUNDRY: UNABLE TO COMPLETE

## 2023-10-29 NOTE — PLAN OF CARE
Night Nursing Note  10/28/23 - 10/29/23      Moe was awake in bathroom at beginning of shift.  Monitored q15 mins for safety.  Offered no complaints at 2345 safety round checks.  Noted to have headphones on during night.  Appeared to sleep majority of night without difficulty.  Continue to monitor, offer support and reassurance per care plan.    Slept 6.5 hrs.

## 2023-10-29 NOTE — PLAN OF CARE
"Patient has spent parts of the shift in the milieu and other times in his room. Denies suicidal ideation and self injurious thoughts. Denies auditory and visual hallucinations. Denies anxiety. Ate dinner and snack. Med compliant. Affect is guarded, flat and tense. Cooperative on the unit.    Patient evaluation continues. Assessed mood,anxiety,thoughts and behavior.     Patient gradually progressing towards goals.    Patient is encouraged to participate in groups and assisted to develop healthy coping skills.     VS reviewed: /83 (BP Location: Left arm, Patient Position: Sitting, Cuff Size: Adult Large)   Pulse 59   Temp 97.7  F (36.5  C) (Temporal)   Resp 16   Ht 1.981 m (6' 6\")   Wt 127.3 kg (280 lb 9.6 oz)   SpO2 97%   BMI 32.43 kg/m      Length of stay: 15    Refer to daily team meeting notes for individualized plan of care. Nursing will continue to assess.    "

## 2023-10-29 NOTE — PLAN OF CARE
Goal Outcome Evaluation:    Plan of Care Reviewed With: patient    Pt spent almost entire shift bed in his room. Pt did not get up for breakfast. Pt got up for lunch and ate 100% then returned to bed shortly after eating. Pt denies any suicidal thoughts.  No response when asked about voices. Does not engage with staff. Pt was isolative and withdrawn.

## 2023-10-30 PROCEDURE — G0177 OPPS/PHP; TRAIN & EDUC SERV: HCPCS

## 2023-10-30 PROCEDURE — 250N000013 HC RX MED GY IP 250 OP 250 PS 637: Performed by: PSYCHIATRY & NEUROLOGY

## 2023-10-30 PROCEDURE — 99232 SBSQ HOSP IP/OBS MODERATE 35: CPT | Performed by: PSYCHIATRY & NEUROLOGY

## 2023-10-30 PROCEDURE — 90853 GROUP PSYCHOTHERAPY: CPT

## 2023-10-30 PROCEDURE — 124N000002 HC R&B MH UMMC

## 2023-10-30 RX ORDER — HALOPERIDOL 10 MG/1
10 TABLET ORAL AT BEDTIME
Status: DISCONTINUED | OUTPATIENT
Start: 2023-10-30 | End: 2023-11-07 | Stop reason: HOSPADM

## 2023-10-30 RX ORDER — HALOPERIDOL 5 MG/ML
5 INJECTION INTRAMUSCULAR AT BEDTIME
Status: DISCONTINUED | OUTPATIENT
Start: 2023-10-30 | End: 2023-11-07 | Stop reason: HOSPADM

## 2023-10-30 RX ADMIN — FLUTICASONE FUROATE AND VILANTEROL TRIFENATATE 1 PUFF: 200; 25 POWDER RESPIRATORY (INHALATION) at 08:39

## 2023-10-30 RX ADMIN — HALOPERIDOL 10 MG: 10 TABLET ORAL at 20:07

## 2023-10-30 RX ADMIN — HALOPERIDOL 5 MG: 5 TABLET ORAL at 08:39

## 2023-10-30 RX ADMIN — FLUTICASONE PROPIONATE 1 SPRAY: 50 SPRAY, METERED NASAL at 20:09

## 2023-10-30 RX ADMIN — FLUTICASONE PROPIONATE 1 SPRAY: 50 SPRAY, METERED NASAL at 08:39

## 2023-10-30 ASSESSMENT — ACTIVITIES OF DAILY LIVING (ADL)
LAUNDRY: WITH SUPERVISION
DRESS: SCRUBS (BEHAVIORAL HEALTH);INDEPENDENT
ADLS_ACUITY_SCORE: 28
DRESS: SCRUBS (BEHAVIORAL HEALTH);INDEPENDENT
LAUNDRY: WITH SUPERVISION
ADLS_ACUITY_SCORE: 28
ORAL_HYGIENE: INDEPENDENT
ADLS_ACUITY_SCORE: 28
ORAL_HYGIENE: INDEPENDENT
HYGIENE/GROOMING: HANDWASHING
ADLS_ACUITY_SCORE: 28
HYGIENE/GROOMING: HANDWASHING;SHOWER;INDEPENDENT

## 2023-10-30 NOTE — PROGRESS NOTES
"[facility], [place]   Psychiatric Progress Note        Interim History:     The patient's care was discussed with the treatment team during the daily team meeting and/or staff's chart notes were reviewed.  Staff report patient slept for about 6.5 hours last night. He spent over weekend little time outside of his room, didn't go to groups, was mostly in his bed either sleeping or listening to headphones. Compliant with meds, continued to appear responding to internal stimuli although, always denies their presence.    Met with patient: patient was seen in his room. He was in his bed asleep with headphones on. Woke up and talked to me. Said that he was sleeping \"because there is nothing else can be done here\". Again denied presence of hallucinations, said that meds were helping and last time he heard voices was before his admission this hospital?! I told him that to many members of this treatment team he looked depressed because of minimal interactions with people and spending so much time inside of his room. Moe said that he was not depressed and promised to try to spend more time outside of his room. He had no more questions or concerns.          Medications:      fluticasone  1 spray Both Nostrils BID    fluticasone-vilanterol  1 puff Inhalation Daily    haloperidol  10 mg Oral At Bedtime    Or    haloperidol lactate  5 mg Intramuscular At Bedtime    haloperidol  5 mg Oral QAM    Or    haloperidol lactate  5 mg Intramuscular QAM    haloperidol decanoate  100 mg Intramuscular Q30 Days          Allergies:     Allergies   Allergen Reactions    No Known Drug Allergy           Labs:   No results found for this or any previous visit (from the past 24 hour(s)).       Psychiatric Examination:     /67   Pulse 78   Temp 97.2  F (36.2  C) (Temporal)   Resp 16   Ht 1.981 m (6' 6\")   Wt 125.2 kg (276 lb 1.6 oz)   SpO2 100%   BMI 31.91 kg/m    Weight is 276 lbs 1.6 oz  Body mass index is " 31.91 kg/m .    Orthostatic Vitals         Most Recent      Standing Orthostatic /66 10/29 0928    Standing Orthostatic Pulse (bpm) 90 10/29 0928           Appearance: awake, alert and dressed in hospital scrubs  Attitude: more cooperative  Eye Contact:  partial  Mood:  dysphoric    Affect:  mood congruent, restricted  Speech:  decreased prosody, slow  Psychomotor Behavior:  no evidence of tardive dyskinesia, dystonia, or tics  Throught Process:  linear, concrete  Associations:  tighter  Thought Content:  patient appears to be responding to internal stimuli off and on, always denies experiencing Auditory hallucinations, See discussion above.   Insight:  partial  Judgement: impaired - improving?  Oriented to:  time, person, and place  Attention Span and Concentration: improving  Recent and Remote Memory:  fair    Clinical Global Impressions  First: 7/4 10/16/2023      Most recent:            Precautions:     Behavioral Orders   Procedures    Assault precautions    Code 1 - Restrict to Unit    Routine Programming     As clinically indicated    Self Injury Precaution    Status 15     Every 15 minutes.          DIagnoses:     Schizophrenia, schizoaffective, chronic with acute exacerbation          Plan:     Got 100 mg of Haldol Decanoate 10/19/2023. Will increase evening Haldol, no other med changes 10/30/2023. Will continue to provide support and structure. Will go to IRTS as family is uncomfortable taking him home, referrals started - See discussion above. Will for now keep on No roommate order.    Total time spent was 28 minutes. Over 50% of times was spent counseling and coordination of care regarding coping skills, medication and discharge planning.

## 2023-10-30 NOTE — PLAN OF CARE
Goal Outcome Evaluation:    Plan of Care Reviewed With: patient    Pt spent all morning in his room, pt declined breakfast.  Pt was medication compliant this morning. Pt up very briefly for lunch then returned to room. Pt came out of room after talking with his CTC and attended group. Pt observed to socialize with peers in [facility]. Writer observed pt talking loudly to self this afternoon. Pt was laughing out loud to self. Pt observed talking and gesturing as if talking to someone in the room.

## 2023-10-30 NOTE — PLAN OF CARE
BEH IP Unit Acuity Rating Score (UARS)  Patient is given one point for every criteria they meet.    CRITERIA SCORING   On a 72 hour hold, court hold, committed, stay of commitment, or revocation 1/1    Patient LOS on BEH unit exceeds 20 days 0/1  LOS: 17   Patient under guardianship, 55+, otherwise medically complex, or under age 11 0/1   Suicide ideation without relief of precipitating factors 0/1   Current plan for suicide 0/1   Current plan for homicide 0/1   Imminent risk or actual attempt to seriously harm another without relief of factors precipitating the attempt 0/1   Severe dysfunction in daily living (ex: complete neglect for self care, extreme disruption in vegetative function, extreme deterioration in social interactions) 0/1   Recent (last 7 days) or current physical aggression in the ED or on unit 0/1   Restraints or seclusion episode in past 72 hours 0/1   Recent (last 7 days) or current verbal aggression, agitation, yelling, etc., while in the ED or unit 0/1   Active psychosis 1/1   Need for constant or near constant redirection (from leaving, from others, etc).  0/1   Intrusive or disruptive behaviors 0/1   TOTAL 2            From: Dami Arana  To: Alex Barker  Sent: 9/21/2023 9:57 AM CDT  Subject: Shoulder     I had a little incident with the dog on her leash that pulled on my repaired shoulder. I would like a referral to see Dr. THOMAS rehman and the physical therapist. Thanks.

## 2023-10-30 NOTE — PROVIDER NOTIFICATION
10/30/23 1333   Individualization/Patient Specific Goals   Patient Personal Strengths expressive of needs;humor;family/social support   Patient Vulnerabilities lacks insight into illness;poor impulse control   Anxieties, Fears or Concerns Pt often denies symptoms. However, pt appears to respond to internal stimuli and makes hand gestures. Pt is isolative and withdrawn on the unit and often spends most of the day in bed and in his room.   Individualized Care Needs Pt requires reminders to take medications and leave his room for meals.   Patient/Family-Specific Goals (Include Timeframe) N/A   Interprofessional Rounds   Summary Pt spent all weekend in his room and did not leave for groups. Pt has been guarded with staff. Nursing staff noted concern that pt may not be taking his medications and is not complying with cheeking precautions. Pt denies symptoms while appearing to respond to internal stimuli and making hand gestures.   Participants CTC;psychiatrist;OT;nursing   Behavioral Team Discussion   Participants Nic Morgan MD; Jean-Paul Underwood RN; CHAPO Keith; Sunshine Burns OTR/L   Progress Pt often isolates in his room and does not participate in group programming. Pt appears to respond to internal stimuli and makes hand gestures while speaking to himself. Pt often denies all mental health symptoms and appears to mask his symptoms.   Anticipated length of stay Pt needs further stabilization and more engagement in programming.   Continued Stay Criteria/Rationale Continues to respond to internal stimuli and is isolative to his room.   Medical/Physical N/A   Precautions SIB and assault precautions   Plan Multidisciplinary team evaluation, medication management, care coordination   Rationale for change in precautions or plan N/A   Safety Plan Per unit protocol   Anticipated Discharge Disposition IRTS     PRECAUTIONS AND SAFETY    Behavioral Orders   Procedures    Assault precautions    Code 1 - Restrict to  Unit    Routine Programming     As clinically indicated    Self Injury Precaution    Status 15     Every 15 minutes.       Safety  Safety WDL: WDL  Patient Location: bathroom, dining room, hallway, lounge, patient room, own  Observed Behavior: calm, sitting, sleeping, walking  Observed Behavior (Comment): withdrawn in room  Safety Measures: alarms on, belongings check completed, clinical history reviewed, environmental rounds completed, safety plan reviewed, safety rounds completed, self-directed behavior promoted, suicide assessment completed, suicide check-in completed  Diversional Activity: television, music  De-Escalation Techniques: appropriate behavior reinforced, diversional activity encouraged  Suicidality: Status 15, Behavioral scrubs (pajamas), Minimal furniture in room, Minimal personal belongings in room  Assault: status 15, private room, staff assignment same gender, staff assignment opposite gender, behavioral scrubs (pajamas)  Elopement Assessment: Distress about legal situation (holds for mental health or criminal), Statements about wanting to leave  Elopement Interventions: status 15, no shoes, room away from unit doors  Sexual: status 15  Additional Documentation:  (SIB Precautions in place.)

## 2023-10-30 NOTE — PLAN OF CARE
"Team Note Due:  Monday    Assessment/Intervention/Current Symtoms and Care Coordination:  Chart review and met with team, discussed pt progress, symptomology, and response to treatment.  Discussed the discharge plan and any potential impediments to discharge.    Tasks Completed:  - Spoke to pt who was laying down in his room with headphones on. When CTC came to pt door, pt was loudly laughing and responding to something and pointing to the corner of his room when there was nobody there. When CTC knocked and asked to speak with pt, pt's demeanor immediately changed and he appropriately engaged in the conversation. CTC shared concerns regarding isolation and withdrawn behavior. Pt reports his medications make him really tired. CTC asked pt if he has talked to the provider about this and pt says he forgot to today. [facility] shared that IRTS placements will want to see that pt is engaging in programming and not isolating in his room. Pt said that he understood and noted \"yeah, because it looks like I'm depressed\" and reported that he is bored in the hospital. CTC encouraged group participation, spending more time out of his room, and engaging with staff and peers. Pt reported he understood. Following conversation pt left his room and reported he was going to go to afternoon group.     Discharge Plan or Goal:  Current plan is to stabilize symptoms and develop safe disposition plan. Following hospitalization, plan is for pt to go to IRTS.      Barriers to Discharge:  At times, pt appears to respond to internal stimuli. Pt denies auditory hallucinations and reports symptoms have improved.     Referral Status:  Pt has psychiatrist in the community and is receiving home health services for support with injections and medication management. Follow-up psychiatry appointment has been scheduled - AVS updated.    [facility] made referrals to the following IRTS programs:  [facility] Central Intake: Submitted " 10/27/2023  Contact: Pilar Spain   Direct dial: [phone]/fax [phone]  Direct email: [email or web address]  General Email: [email or web address]  Includes:   [facility]  [facility]  [facility] [place]  [facility]  [facility]  [facility]  [facility]     [facility]: Submitted 10/27/2023  [facility]: [phone]  Fax: [phone]    [facility]: Submitted 10/27/2023  Phone: [phone]   Fax: [phone]  10/30/2023: Received voicemail from [facility] noting receipt of referral. [facility] reports that they are out about 3-4 weeks and they will add referral to list.     [facility]: Submitted 10/27/2023  Phone: [phone]    Fax: [phone]   email: [email or web address]  10/27/2023: Confirmed receipt of referral. No current openings but may have some coming up. Estimated between 2-3 weeks. Civil commitment paperwork can be shared once further in the admission process.     Legal Status:  MI Commitment and Bolanos Order (PD revoked)   County: [facility]  File Number: [id number]  Start and expiration date of commitment: Start date is 9/28/2023   Bolanos Ordered Medications: Zyprexa, Haldol, Risperdal, Invega, Clozaril, Thorazine    Contacts:  Medication Management/Psychiatry:  Name/Clinic: Dr. Felipe Zamorano [facility]   Phone: [phone]    [facility] on 10/6/2023 Nicola Carias RN      /ACT Team:  Name: Maryam Miranda  Email: [email or web address]  Phone: [phone]      Family Contacts:   Name: Angeles Rosales - mother (BUTCH on file)  Phone: [phone]    Name: Jostin Doe - father (BUTCH on file)  Phone: [phone]     Upcoming Meetings and Dates/Important Information and next steps:  None

## 2023-10-30 NOTE — PLAN OF CARE
Night Nursing Note   10/29/23 - 10/30/23      Moe was in bed at beginning of shift and appeared asleep.  Monitored q15 mins for safety.    Awake in lounge @ 0045. Requested and given toast, sun butter, yogurt,   string cheese, milk and crackers.  Offered no complaints.  Appeared guarded and responding to  internal stimuli.  Remain behaviorally controlled.    After snack went back to room and went to bed.    Noted that Moe has been wearing headphones throughout the night.  Appeared to sleep majority of night without difficulty.    Continue to monitor, offer support and reassurance per care plan.    Slept 6.5 hrs.

## 2023-10-30 NOTE — CARE PLAN
10/30/23 1356   Group Therapy Session   Group Attendance attended group session   Time Session Began 1300   Time Session Ended 1345   Total Time (minutes) 45   Total # Attendees 5   Group Type life skill;other (see comments)  (OT)   Group Topic Covered balanced lifestyle;cognitive activities;structured socialization   Group Session Detail OT - Topic:  Focus of group activity was use of cognitive techniques through word associations with working toward matching other players. Also opportunity to practice socialization and ways to support others through enjoyable leisure activities.   Patient Response/Contribution able to recall/repeat info presented;cooperative with task;expressed understanding of topic   Patient Participation Detail Pt initiated coming to group after talking to [facility] staff. He had slight difficulty initially with putting the matching word in order yet he was able to match with others several times. After 30 minutes, he began to laugh to himself and talk and then pulled his sweatshirt up over his head for a few minutes. Asked if group was almost over toward the end and did stay for final match.

## 2023-10-30 NOTE — PLAN OF CARE
"Patient has spent majority of the shift in the milieu. Denies suicidal ideation and self injurious thoughts. Denies anxiety and depression. Denies auditory and visual hallucinations. Observed talking to himself and making hand gestures. Ate dinner and snack. Med compliant. Affect is guarded and tense.     Patient evaluation continues. Assessed mood,anxiety,thoughts and behavior.     Patient gradually progressing towards goals.    Patient is encouraged to participate in groups and assisted to develop healthy coping skills.     VS reviewed: /67   Pulse 78   Temp 97.2  F (36.2  C) (Temporal)   Resp 16   Ht 1.981 m (6' 6\")   Wt 125.2 kg (276 lb 1.6 oz)   SpO2 100%   BMI 31.91 kg/m      Length of stay: 16    Refer to daily team meeting notes for individualized plan of care. Nursing will continue to assess.    "

## 2023-10-30 NOTE — CARE PLAN
10/30/23 1555   Group Therapy Session   Group Attendance attended group session   Time Session Began 1400   Time Session Ended 1500   Total Time (minutes) 60   Total # Attendees 7   Group Type life skill   Group Topic Covered balanced lifestyle   Group Session Detail The group discussed what a balanced life looks like. Individuals shared ways that they are achieving this and what areas they could improve on. Individuals filled out a worksheet and assessed areas of their lives and how they are being balanced. These areas include Spiritual, Physical, Financial, Intellectual, Emotional, and Social. The group members shared what was on their worksheet.   Patient Response/Contribution cooperative with task;discussed personal experience with topic   Patient Participation Detail Pt did a check in at the beginning of group and shared that he is feeling good. Pt was laughing at himself at random times throughtout the group time. Pt would change nothing about their lifestyle. Pt reports wanting to trust in himself and listen to people.

## 2023-10-31 PROCEDURE — 99232 SBSQ HOSP IP/OBS MODERATE 35: CPT | Performed by: PSYCHIATRY & NEUROLOGY

## 2023-10-31 PROCEDURE — 124N000002 HC R&B MH UMMC

## 2023-10-31 PROCEDURE — 250N000013 HC RX MED GY IP 250 OP 250 PS 637: Performed by: PSYCHIATRY & NEUROLOGY

## 2023-10-31 RX ADMIN — HALOPERIDOL 10 MG: 10 TABLET ORAL at 20:01

## 2023-10-31 RX ADMIN — FLUTICASONE PROPIONATE 1 SPRAY: 50 SPRAY, METERED NASAL at 10:12

## 2023-10-31 RX ADMIN — FLUTICASONE PROPIONATE 1 SPRAY: 50 SPRAY, METERED NASAL at 20:00

## 2023-10-31 RX ADMIN — HALOPERIDOL 5 MG: 5 TABLET ORAL at 10:11

## 2023-10-31 RX ADMIN — FLUTICASONE FUROATE AND VILANTEROL TRIFENATATE 1 PUFF: 200; 25 POWDER RESPIRATORY (INHALATION) at 10:12

## 2023-10-31 ASSESSMENT — ACTIVITIES OF DAILY LIVING (ADL)
ADLS_ACUITY_SCORE: 28
DRESS: INDEPENDENT
ADLS_ACUITY_SCORE: 28
ORAL_HYGIENE: INDEPENDENT
HYGIENE/GROOMING: INDEPENDENT
LAUNDRY: WITH SUPERVISION
ADLS_ACUITY_SCORE: 28
ORAL_HYGIENE: INDEPENDENT
DRESS: SCRUBS (BEHAVIORAL HEALTH);INDEPENDENT
ADLS_ACUITY_SCORE: 28
HYGIENE/GROOMING: INDEPENDENT
LAUNDRY: WITH SUPERVISION

## 2023-10-31 NOTE — PLAN OF CARE
Problem: Adult Behavioral Health Plan of Care  Goal: Adheres to Safety Considerations for Self and Others  Recent Flowsheet Documentation  Taken 10/31/2023 1211 by Casalenda, Gina R, RN  Adheres to Safety Considerations for Self and Others: making progress toward outcome     Problem: Adult Behavioral Health Plan of Care  Goal: Adheres to Safety Considerations for Self and Others  Intervention: Develop and Maintain Individualized Safety Plan  Recent Flowsheet Documentation  Taken 10/31/2023 1010 by Casalenda, Gina R, RN  Safety Measures: safety rounds completed     Pt denies anxiety and depression. Pt denies SI/SIB/HI/AH/VH. Pt appears to be responding to internal stimuli. Pt observed talking and laughing loudly to himself while alone in his room. Pt also observed making gestures in his room while alone, as well as raising in arms in the air while walking in the hallway. Pt appears distracted. Pt contracts for safety. Pt denies pain. A/O. VSS.    Pt's eye contact observed to be appropriate. Pt's speech observed to be soft/quiet with paucity. Pt observed to be guarded. Pt keeps to himself while out on the unit; however, pt was observed socializing on occasion with staff and peers if they engaged pt first. Pt observed to be calm and cooperative. Pt observed to be pleasant. Pt's affect observed to be restricted; however, pt does appear to be animated when he is laughing and talking to himself when alone in his room or when he is walking in the hallway.    Pt slept in this morning. Around 1010, writer woke pt and pt accepted his morning medications from writer. Pt observed resting in bed again shortly afterward; however, did eventually get up to eat breakfast. Pt visible on unit attending lunch, pacing the hallway, listening to headphones, and socializing with select staff and peers. Pt visible intermittently listening to headphones and resting in his room. Pt attended group this shift.     Pt is medication compliant.  "Pt reports his medications make him \"tired\" - writer informed provider of this and encouraged pt to discuss further with provider - pt acknowledged an understanding of this. Pt states, \"I hope I can be out of here before Thanksgiving.\" Pt denies any additional questions or concerns at this time. He continues to have a no roommate order. He remains on assault and SIB precautions - no associated behaviors noted this shift. Will continue to monitor and assist as needed.    /82 (BP Location: Right arm, Patient Position: Sitting)   Pulse 78   Temp 97.8  F (36.6  C) (Oral)   Resp 16   Ht 1.981 m (6' 6\")   Wt 125.2 kg (276 lb 1.6 oz)   SpO2 100%   BMI 31.91 kg/m    "

## 2023-10-31 NOTE — PLAN OF CARE
Night Nursing Note  10/30/23 - 10/31/23      Moe was in bed at beginning of shift and appeared asleep.  Monitored q15 mins for safety.  Observed wearing headphones throughout night during safety  round checks.  Appeared to sleep majority of night without difficulty.  Continue to monitor, offer support and reassurance per care plan.    Slept 7 hrs.

## 2023-10-31 NOTE — PLAN OF CARE
Team Note Due:  Monday    Assessment/Intervention/Current Symtoms and Care Coordination:  Chart review and met with team, discussed pt progress, symptomology, and response to treatment.  Discussed the discharge plan and any potential impediments to discharge.     Discharge Plan or Goal:  Current plan is to stabilize symptoms and develop safe disposition plan. Following hospitalization, plan is for pt to go to [facility].      Barriers to Discharge:  At times, pt appears to respond to internal stimuli. Pt denies auditory hallucinations and reports symptoms have improved.     Referral Status:  Pt has psychiatrist in the community and is receiving home health services for support with injections and medication management. Follow-up psychiatry appointment has been scheduled - AVS updated.    CTC made referrals to the following IR programs:  [facility] Central Intake: Submitted 10/27/2023  Contact: Pilar Spain   Direct dial: [phone]/fax [phone]  Direct email: [email or web address]  General Email: [email or web address]  Includes:   [facility] Options [facility]  [facility]  Transitions on [address]  [facility] Options [facility]  Transfer Home  [facility]  [place] [facility]     [facility]: Submitted 10/27/2023  [facility]: [phone]  Fax: [phone]    [facility] [place]: Submitted 10/27/2023  Phone: [phone]   Fax: [phone]  10/30/2023: Received voicemail from Kwasi noting receipt of referral. Kwasi reports that they are out about 3-4 weeks and they will add referral to list.     [facility]: Submitted 10/27/2023  Phone: [phone]    Fax: [phone]   email: [email or web address]  10/27/2023: Confirmed receipt of referral. No current openings but may have some coming up. Estimated between 2-3 weeks. Civil commitment paperwork can be shared once  further in the admission process.     Legal Status:  MI Commitment and Bolanos Order (PD revoked)   County: [facility]  File Number: [id number]  Start and expiration date of commitment: Start date is 9/28/2023   Bolanos Ordered Medications: Zyprexa, Haldol, Risperdal, Invega, Clozaril, Thorazine    Contacts:  Medication Management/Psychiatry:  Name/Clinic: Dr. Felipe Zamorano [facility]   Phone: [phone]    [facility] on 10/6/2023 Nicola Carias RN      /ACT Team:  Name: Maryam Miranda  Email: [email or web address]  Phone: [phone]      Family Contacts:   Name: Nydenia Connei - mother (BUTCH on file)  Phone: [phone]    Name: Jostindioni Doe - father (BUTCH on file)  Phone: [phone]     Upcoming Meetings and Dates/Important Information and next steps:  None

## 2023-10-31 NOTE — PLAN OF CARE
Goal Outcome Evaluation:    Plan of Care Reviewed With: patient        Problem: Adult Behavioral Health Plan of Care  Goal: Plan of Care Review  Outcome: Progressing  Patient Agreement with Plan of Care: agrees     Problem: Psychotic Signs/Symptoms  Goal: Improved Behavioral Control (Psychotic Signs/Symptoms)  Outcome: Progressing  Intervention: Manage Behavior  appropriate behavior reinforced  divisional activity encouraged  medication administered   Patient reports that she is feeling good, he appeared to be calm, positive and had a relaxed mood. Patients affect was bright, speech was normal. Patient did not present with any signs of hallucinations or delusions. No signs or symptoms of any pain or discomfort present. Patient denied anxiety and depression, no s/s of SI/SIB/AVH, Patient was isolated in his room, but social and appropriate when coming out. Patient was medication compliant, meal intake was adequate.will continue with the plan of care.

## 2023-10-31 NOTE — PLAN OF CARE
BEH IP Unit Acuity Rating Score (UARS)  Patient is given one point for every criteria they meet.    CRITERIA SCORING   On a 72 hour hold, court hold, committed, stay of commitment, or revocation 1/1    Patient LOS on BEH unit exceeds 20 days 0/1  LOS: 18   Patient under guardianship, 55+, otherwise medically complex, or under age 11 0/1   Suicide ideation without relief of precipitating factors 0/1   Current plan for suicide 0/1   Current plan for homicide 0/1   Imminent risk or actual attempt to seriously harm another without relief of factors precipitating the attempt 0/1   Severe dysfunction in daily living (ex: complete neglect for self care, extreme disruption in vegetative function, extreme deterioration in social interactions) 0/1   Recent (last 7 days) or current physical aggression in the ED or on unit 0/1   Restraints or seclusion episode in past 72 hours 0/1   Recent (last 7 days) or current verbal aggression, agitation, yelling, etc., while in the ED or unit 0/1   Active psychosis 1/1   Need for constant or near constant redirection (from leaving, from others, etc).  0/1   Intrusive or disruptive behaviors 0/1   TOTAL 2

## 2023-10-31 NOTE — CARE PLAN
"Occupational Therapy Group Note:     10/31/23 1606   Group Therapy Session   Group Attendance attended group session   Time Session Began 1300   Time Session Ended 1345   Total Time (minutes) 30 (no charge)   Total # Attendees 8   Group Type expressive therapy   Group Topic Covered coping skills/lifestyle management;emotions/expression;structured socialization   Group Session Detail OT Activity Group: Halloween Scavenger Hunt Collage or Who Am I Collage   Patient Response/Contribution cooperative with task   Patient Participation Detail Patient actively participated in a Halloween inspired scavenger hunt collage. This activity was facilitated in order to promote: problem solving skills, attention/focus, and creative expression. Patient was able to follow written guided prompts; however, patient gave up on finding requested images prior to conclusion of group. Patient reporting, \"I can't find anymore\" and decided to leave group. No return. Patient worked slowly and appeared distracted throughout group. Patient appeared to be responding to internal stimuli in group: laughing and mumbling to self with occasional arm/hand gestures. Patient was not social with peers. Patient chose to leave group early without finishing project. Affect: flat/blunted. Mood: calm.       "

## 2023-10-31 NOTE — PROGRESS NOTES
"[facility], [place]   Psychiatric Progress Note        Interim History:     The patient's care was discussed with the treatment team during the daily team meeting and/or staff's chart notes were reviewed.  Staff report patient slept for about 7 hours last night. After yesterday talk he spent more time outside of his room, didn't go to groups, was mostly in his bed either sleeping or listening to headphones. Compliant with meds, continued to appear responding to internal stimuli although, always denies their presence, see RN's note below:    \"Pt denies anxiety and depression. Pt denies SI/SIB/HI/AH/VH. Pt appears to be responding to internal stimuli. Pt observed talking and laughing loudly to himself while alone in his room. Pt also observed making gestures in his room while alone, as well as raising in arms in the air while walking in the hallway. Pt appears distracted. Pt contracts for safety. Pt denies pain. A/O. VSS.\"    Met with patient: patient was seen in milieu. He was sitting in front of TV listening to relaxing music. Said that he felt calm and decided to spend more time outside of his room because: \"you and  told me to do so\". Asked about discharge. I praised him for spending more time outside and suggested to keep doing that so we could watch his interactions with other people. Moe agreed to do that, said that he, certainly, felt much better now compared with how he felt before coming to this hospital and said that he had been out of hospital for 3 years before this hospitalization and agreed that medications were important part of maintaining his health. He had no more questions or concerns.          Medications:      fluticasone  1 spray Both Nostrils BID    fluticasone-vilanterol  1 puff Inhalation Daily    haloperidol  10 mg Oral At Bedtime    Or    haloperidol lactate  5 mg Intramuscular At Bedtime    haloperidol  5 mg Oral QAM    Or    haloperidol lactate  " "5 mg Intramuscular QAM    haloperidol decanoate  100 mg Intramuscular Q30 Days          Allergies:     Allergies   Allergen Reactions    No Known Drug Allergy           Labs:   No results found for this or any previous visit (from the past 24 hour(s)).       Psychiatric Examination:     /82 (BP Location: Right arm, Patient Position: Sitting)   Pulse 78   Temp 97.8  F (36.6  C) (Oral)   Resp 16   Ht 1.981 m (6' 6\")   Wt 125.2 kg (276 lb 1.6 oz)   SpO2 100%   BMI 31.91 kg/m    Weight is 276 lbs 1.6 oz  Body mass index is 31.91 kg/m .    Orthostatic Vitals       None           Appearance: awake, alert and dressed in hospital scrubs  Attitude: more cooperative  Eye Contact:  partial  Mood:  better   Affect:  mood congruent, restricted, but occasionally smiles  Speech:  decreased prosody, slow  Psychomotor Behavior:  no evidence of tardive dyskinesia, dystonia, or tics  Throught Process:  linear, concrete  Associations:  tighter  Thought Content:  patient appears to be responding to internal stimuli off and on, always denies experiencing Auditory hallucinations, See discussion above.   Insight:  partial  Judgement: impaired - improving?  Oriented to:  time, person, and place  Attention Span and Concentration: improving  Recent and Remote Memory:  fair    Clinical Global Impressions  First: 7/4 10/16/2023      Most recent:            Precautions:     Behavioral Orders   Procedures    Assault precautions    Code 1 - Restrict to Unit    Routine Programming     As clinically indicated    Self Injury Precaution    Status 15     Every 15 minutes.          DIagnoses:     Schizophrenia, schizoaffective, chronic with acute exacerbation          Plan:     Got 100 mg of Haldol Decanoate 10/19/2023. Evening haldol was increased on 10/30/2023. No medication changes today 10/31/2023. Will continue to provide support and structure. Will go to IRTS as family is uncomfortable taking him home, referrals started - See " discussion above. Will for now keep on No roommate order.    Total time spent was 35 minutes. Over 50% of times was spent counseling and coordination of care regarding coping skills, medication and discharge planning.

## 2023-11-01 PROCEDURE — 124N000002 HC R&B MH UMMC

## 2023-11-01 PROCEDURE — 99231 SBSQ HOSP IP/OBS SF/LOW 25: CPT | Performed by: PSYCHIATRY & NEUROLOGY

## 2023-11-01 PROCEDURE — 250N000013 HC RX MED GY IP 250 OP 250 PS 637: Performed by: PSYCHIATRY & NEUROLOGY

## 2023-11-01 PROCEDURE — 90853 GROUP PSYCHOTHERAPY: CPT

## 2023-11-01 RX ADMIN — HALOPERIDOL 5 MG: 5 TABLET ORAL at 08:34

## 2023-11-01 RX ADMIN — FLUTICASONE PROPIONATE 1 SPRAY: 50 SPRAY, METERED NASAL at 08:34

## 2023-11-01 RX ADMIN — FLUTICASONE PROPIONATE 1 SPRAY: 50 SPRAY, METERED NASAL at 20:23

## 2023-11-01 RX ADMIN — FLUTICASONE FUROATE AND VILANTEROL TRIFENATATE 1 PUFF: 200; 25 POWDER RESPIRATORY (INHALATION) at 08:34

## 2023-11-01 RX ADMIN — HALOPERIDOL 10 MG: 10 TABLET ORAL at 20:23

## 2023-11-01 ASSESSMENT — ACTIVITIES OF DAILY LIVING (ADL)
ADLS_ACUITY_SCORE: 28
HYGIENE/GROOMING: INDEPENDENT
ADLS_ACUITY_SCORE: 28
DRESS: SCRUBS (BEHAVIORAL HEALTH);INDEPENDENT
ADLS_ACUITY_SCORE: 28
ORAL_HYGIENE: INDEPENDENT
ADLS_ACUITY_SCORE: 28
LAUNDRY: WITH SUPERVISION
DRESS: SCRUBS (BEHAVIORAL HEALTH);INDEPENDENT;STREET CLOTHES
ADLS_ACUITY_SCORE: 28
HYGIENE/GROOMING: HANDWASHING;SHOWER;INDEPENDENT
LAUNDRY: UNABLE TO COMPLETE
ORAL_HYGIENE: INDEPENDENT

## 2023-11-01 NOTE — PROGRESS NOTES
CLINICAL NUTRITION SERVICES - BRIEF NOTE     Nutrition Prescription    RECOMMENDATIONS FOR MDs/PROVIDERS TO ORDER:  RD to sign off at this time. Please re-consult if further nutrition needs arise.     Recommendations already ordered by Registered Dietitian (RD):  Ordered vanilla/strawberry Ensure Enlive TID with all meals.  Pt approved for write ins: tacos, chicken tenders, chicken quesadilla, hot chicken sandwich, mac and cheese.  Double portions of entrees (if pt selects two entrees, please provide 1 portion of each)     NEW FINDINGS   Received consult for: Patient/Family Request - Pt states is tired of meal options, not finding meal options he wants to eat. Didn't eat lunch today saying he didn't like meal, didn't fill out menu as nothing looked good.     Met with pt. He reports that he's tired of the food options. He's agreeable to write in options. He also asked if he could get protein drinks.     INTERVENTIONS  Medical food supplement therapy  Modification of meals and snacks    Monitoring/Evaluation  RD to sign off at this time. Please re-consult if further nutrition needs arise.     Bladimir You RD, LD  Behavioral Health RD Pager: [phone]  Weekend/holiday pager: [phone]

## 2023-11-01 NOTE — PLAN OF CARE
Goal Outcome Evaluation:  Pt spent all morning in his room resting in bed.  Pt refused his breakfast but was medication compliant. Pt got up for lunch but refused all of his lunch except for his cookie. Pt was up for about 10 minutes and observed laughing loudly to self before returning to his room. Pt did attend afternoon group and met with dietician to discuss food preferences.

## 2023-11-01 NOTE — PLAN OF CARE
Team Note Due:  Monday    Assessment/Intervention/Current Symtoms and Care Coordination:  Chart review and met with team, discussed pt progress, symptomology, and response to treatment.  Discussed the discharge plan and any potential impediments to discharge.     Discharge Plan or Goal:  Current plan is to stabilize symptoms and develop safe disposition plan. Following hospitalization, plan is for pt to go to [facility]RTS.      Barriers to Discharge:  At times, pt appears to respond to internal stimuli. Pt denies auditory hallucinations and reports symptoms have improved.     Referral Status:  Pt has psychiatrist in the community and is receiving home health services for support with injections and medication management. Follow-up psychiatry appointment has been scheduled - AVS updated.    CTC made referrals to the following IR programs:  [facility] Central Intake: Submitted 10/27/2023  Contact: Pilar Spain   Direct dial: [phone]/fax [phone]  Direct email: [email or web address]  General Email: [email or web address]  Includes:   [facility]  [facility]  Transitions on [place]  [facility] Options [facility]  Transfer Home  [facility]  [place] [facility]     [facility]: Submitted 10/27/2023  [facility]: [phone]  Fax: [phone]    [facility] [place]: Submitted 10/27/2023  Phone: [phone]   Fax: [phone]  10/30/2023: Received voicemail from [facility]wasi noting receipt of referral. Kwasi reports that they are out about 3-4 weeks and they will add referral to list.     [facility]: Submitted 10/27/2023  Phone: [phone]    Fax: [phone]   email: [email or web address]  10/27/2023: Confirmed receipt of referral. No current openings but may have some coming up. Estimated between 2-3 weeks. Civil commitment paperwork can be shared once  further in the admission process.     Legal Status:  MI Commitment and Bolanos Order (PD revoked)   [facility]  File Number: [id number]  Start and expiration date of commitment: Start date is 9/28/2023   Bolanos Ordered Medications: Zyprexa, Haldol, Risperdal, Invega, Clozaril, Thorazine    Contacts:  Medication Management/Psychiatry:  Name/Clinic: Dr. Felipe Zamorano [facility]   Phone: [phone]    [facility] on 10/6/2023 Nicola Carias RN      /ACT Team:  Name: Maryam Miranda  Email: [email or web address]  Phone: [phone]      Family Contacts:   Name: Nydenia Connie - mother (BUTCH on file)  Phone: [phone]    Name: Jostindioni Doe - father (BUTCH on file)  Phone: [phone]     Upcoming Meetings and Dates/Important Information and next steps:  None

## 2023-11-01 NOTE — CARE PLAN
11/01/23 1444   Group Therapy Session   Group Attendance attended group session   Time Session Began 1300   Time Session Ended 1400   Total Time (minutes) 15   Total # Attendees 5   Group Type psychotherapeutic   Group Topic Covered coping skills/lifestyle management   Group Session Detail The  went over what positive thoughts and affirmations are and had a group discussion about them. Pts were given a sheet of 101 different positive thoughts and affirmations. Pts were also given a blank calendar for the corresponding month. Pts were asked to pick a positive affirmation for each day of the month to reference at the start of the day or on a difficult day.   Patient Response/Contribution cooperative with task;discussed personal experience with topic;organized   Patient Participation Detail Pt did a check in and reported feeling good. Pt came toward the end of group so did not have time to complete the task. Pt came and joined in discussion with the rest of group.

## 2023-11-01 NOTE — PLAN OF CARE
Problem: Sleep Disturbance  Goal: Adequate Sleep/Rest  Outcome: Progressing   Goal Outcome Evaluation:    0315-6388: Pt sleeping at the start of the shift in no apparent distress. Continue on SIB/assault precautions without any  related behavior noted.Slept all night for 5.75 hours.Safety maintained.

## 2023-11-01 NOTE — CARE PLAN
11/01/23 1458   Group Therapy Session   Group Attendance attended group session   Time Session Began 1400   Time Session Ended 1420   Total Time (minutes) 20 - no charge   Total # Attendees 1   Group Type life skill;other (see comments)  (OT)   Group Topic Covered balanced lifestyle;self-care activities;coping skills/lifestyle management   Group Session Detail OT - Coping:  Focus of group was identification of various activities of daily living that are important to engage in for mental health and well being. Emphasis was on how to work on balance and make modifications as needed.   Patient Response/Contribution able to recall/repeat info presented;cooperative with task;expressed understanding of topic   Patient Participation Detail Pt was only one who attended group. He was open to discuss routine activities and was on topic and answered questions from staff. He gave examples of various activities he engages in such as reading, playing basketball at a gym facility, cooking some times, being on his phone, creative activities. He did note that he does not care to really engage in chores - mowing lawn and shoveling snow that his mother asks him to do. As the discussion was ending, he asked staff to make sure that it was recorded that he has come to groups. He did share that he finds it easier to stay focused with one other person vs being in a group discussion or activity, as thoughts, etc do intrude at times and he finds it easier to be engaged with an individual person. No distraction noted or attending to internal stimuli noted for the time with staff.

## 2023-11-01 NOTE — PROGRESS NOTES
"[facility], [place]   Psychiatric Progress Note        Interim History:     The patient's care was discussed with the treatment team during the daily team meeting and/or staff's chart notes were reviewed.  Staff report patient slept for about 6.25 hours. He was seen intermittently in the lounge, watching TV with other patients, but had limited interactions with them. Spent a lot of time in his room, went to one group yesterday. Was still reported to be talking to self and attending to internal stimuli.    Met with patient: patient was seen in his room/bed. He was asleep, but woke up and talked to me. Stated that he was doing OK, denied med side effects. Denied Auditory hallucinations. I gently prodded him and told that people continue to see him talking to self and emphasized that hearing voices by itself is not a contraindication for discharge and encouraged him to be more open to us. Moe still stated that people who were reporting on him were wrong and he was not hallucinating. I asked if he felt better compared to prior to admission. Moe this time said firmly \"yes, I am doing much better, my thinking is more organized\" and seemed to be honest.  He had no more questions or concerns.         Medications:      fluticasone  1 spray Both Nostrils BID    fluticasone-vilanterol  1 puff Inhalation Daily    haloperidol  10 mg Oral At Bedtime    Or    haloperidol lactate  5 mg Intramuscular At Bedtime    haloperidol  5 mg Oral QAM    Or    haloperidol lactate  5 mg Intramuscular QAM    haloperidol decanoate  100 mg Intramuscular Q30 Days          Allergies:     Allergies   Allergen Reactions    No Known Drug Allergy           Labs:   No results found for this or any previous visit (from the past 24 hour(s)).       Psychiatric Examination:     /74   Pulse 80   Temp 98.2  F (36.8  C) (Temporal)   Resp 16   Ht 1.981 m (6' 6\")   Wt 125.2 kg (276 lb 1.6 oz)   SpO2 100%   BMI 31.91 " kg/m    Weight is 276 lbs 1.6 oz  Body mass index is 31.91 kg/m .    Orthostatic Vitals       None           Appearance: awake, alert and dressed in hospital scrubs  Attitude: more cooperative but also guarded  Eye Contact:  partial  Mood:  I am OK   Affect:  mood congruent, restricted, but occasionally smiles  Speech:  decreased prosody, slow  Psychomotor Behavior:  no evidence of tardive dyskinesia, dystonia, or tics  Throught Process:  linear, concrete  Associations:  tighter  Thought Content:  patient appears to be responding to internal stimuli off and on, always denies experiencing Auditory hallucinations, See discussion above.   Insight:  partial  Judgement: impaired - improving?  Oriented to:  time, person, and place  Attention Span and Concentration: improving  Recent and Remote Memory:  fair    Clinical Global Impressions  First: 7/4 10/16/2023      Most recent:            Precautions:     Behavioral Orders   Procedures    Assault precautions    Code 1 - Restrict to Unit    Routine Programming     As clinically indicated    Self Injury Precaution    Status 15     Every 15 minutes.          DIagnoses:     Schizophrenia, schizoaffective, chronic with acute exacerbation          Plan:     Got 100 mg of Haldol Decanoate 10/19/2023. Evening haldol was increased on 10/30/2023. No medication changes today 11/1/2023. Will continue to provide support and structure. Will go to IRTS as family is uncomfortable taking him home, referrals started - See discussion above. Will for now keep on No roommate order.    Total time spent was 25 minutes. Over 50% of times was spent counseling and coordination of care regarding coping skills, medication and discharge planning.

## 2023-11-01 NOTE — PLAN OF CARE
BEH IP Unit Acuity Rating Score (UARS)  Patient is given one point for every criteria they meet.    CRITERIA SCORING   On a 72 hour hold, court hold, committed, stay of commitment, or revocation 1/1    Patient LOS on BEH unit exceeds 20 days 0/1  LOS: 19   Patient under guardianship, 55+, otherwise medically complex, or under age 11 0/1   Suicide ideation without relief of precipitating factors 0/1   Current plan for suicide 0/1   Current plan for homicide 0/1   Imminent risk or actual attempt to seriously harm another without relief of factors precipitating the attempt 0/1   Severe dysfunction in daily living (ex: complete neglect for self care, extreme disruption in vegetative function, extreme deterioration in social interactions) 0/1   Recent (last 7 days) or current physical aggression in the ED or on unit 0/1   Restraints or seclusion episode in past 72 hours 0/1   Recent (last 7 days) or current verbal aggression, agitation, yelling, etc., while in the ED or unit 0/1   Active psychosis 1/1   Need for constant or near constant redirection (from leaving, from others, etc).  0/1   Intrusive or disruptive behaviors 0/1   TOTAL 2

## 2023-11-01 NOTE — PLAN OF CARE
"Goal Outcome Evaluation:    Patient is isolative and withdrawn to self. Patient presents with a flat blunted affect, mood is calm, and speech is soft/quiet. Patient observed in the hallway with his headphones on.Patient requested for candy and vitamin water from his locker at evening snack and was given. Patient intermittently observed out in the lounge watching TV shows, and keeps to himself. Overall patient is pleasant, polite and no behaviors noted this shift. Patient denies any pain, anxiety,SI/HI, depression and hallucinations.Will continue to monitor behavior and encourage engagement. Patient is med compliant, and offers no other concerns. Staff will continue to monitor.    Vitals./75   Pulse 80   Temp 97.9  F (36.6  C) (Oral)   Resp 16   Ht 1.981 m (6' 6\")   Wt 125.2 kg (276 lb 1.6 oz)   SpO2 100%   BMI 31.91 kg/m     "

## 2023-11-02 PROCEDURE — 250N000013 HC RX MED GY IP 250 OP 250 PS 637: Performed by: PSYCHIATRY & NEUROLOGY

## 2023-11-02 PROCEDURE — 99232 SBSQ HOSP IP/OBS MODERATE 35: CPT | Performed by: PSYCHIATRY & NEUROLOGY

## 2023-11-02 PROCEDURE — G0177 OPPS/PHP; TRAIN & EDUC SERV: HCPCS

## 2023-11-02 PROCEDURE — 124N000002 HC R&B MH UMMC

## 2023-11-02 RX ADMIN — HALOPERIDOL 10 MG: 10 TABLET ORAL at 20:05

## 2023-11-02 RX ADMIN — FLUTICASONE FUROATE AND VILANTEROL TRIFENATATE 1 PUFF: 200; 25 POWDER RESPIRATORY (INHALATION) at 08:54

## 2023-11-02 RX ADMIN — HALOPERIDOL 5 MG: 5 TABLET ORAL at 08:54

## 2023-11-02 RX ADMIN — FLUTICASONE PROPIONATE 1 SPRAY: 50 SPRAY, METERED NASAL at 20:06

## 2023-11-02 ASSESSMENT — ACTIVITIES OF DAILY LIVING (ADL)
HYGIENE/GROOMING: INDEPENDENT
LAUNDRY: WITH SUPERVISION
ADLS_ACUITY_SCORE: 28
DRESS: SCRUBS (BEHAVIORAL HEALTH);STREET CLOTHES;INDEPENDENT
ADLS_ACUITY_SCORE: 28
ADLS_ACUITY_SCORE: 28
LAUNDRY: UNABLE TO COMPLETE
ADLS_ACUITY_SCORE: 28
ADLS_ACUITY_SCORE: 28
DRESS: SCRUBS (BEHAVIORAL HEALTH);INDEPENDENT
ORAL_HYGIENE: INDEPENDENT
ADLS_ACUITY_SCORE: 28
ADLS_ACUITY_SCORE: 28
HYGIENE/GROOMING: HANDWASHING;SHOWER;INDEPENDENT
ORAL_HYGIENE: INDEPENDENT
ADLS_ACUITY_SCORE: 28

## 2023-11-02 NOTE — CARE PLAN
11/02/23 1433   Group Therapy Session   Group Attendance attended group session   Time Session Began 1315   Time Session Ended 1345   Total Time (minutes) 45   Total # Attendees 4   Group Type life skill;other (see comments)  (OT)   Group Topic Covered balanced lifestyle;coping skills/lifestyle management;other (see comments)  (sensorimotor)   Group Session Detail OT - Topic: Group focus was learning and practice of use of a variety of sensorimotor techniques for calming and self regulation. Emphasis was on activation of the proprioceptive and vestibular senses.    Patient Response/Contribution able to recall/repeat info presented;cooperative with task   Patient Participation Detail Pt initiated coming to group. He was generally quiet during introduction discussion yet would briefly answer about types of movement he does when asked directly. He initially did follow the movements as directed and then began to engage in his own movements that were somewhat similar to what staff was directing. Near the end of group, he was smiling on occasion to himself. No comments at end of group except when staff noted what the next group would be, he asked if he could choose music that would be played for the music therapy group.

## 2023-11-02 NOTE — PLAN OF CARE
Goal Outcome Evaluation:     Pt spent all morning in his room in bed.  Pt refused breakfast. Pt was compliant with his haldol and inhaler but declined flonase this morning when it was available. Pt ate well for breakfast and attended a group this afternoon. Pt observed moving lips and staring off  fixed gaze in lounge. Pt is dismissive of staff denies hallucinations. Offers very minimal responses to questions or doesn't respond at all.

## 2023-11-02 NOTE — PLAN OF CARE
"Goal Outcome Evaluation:    Patient was observed out on the unit with his headphones on, sitting in the lounge watching a movie with his peers. Patient occasionally would be noted to talk to self, laugh and hop on one foot in the lounge then sit down. Patient was pleasant on approach, and when asked the assessment questions, he would respond \"I'm good, I'm watching a movie\".Patient was also noted to be responding to internal stimuli when writer checked on him in his room, he was having a serious conversation by himself facing the window. Patient ate all the food on his tray at dinner and was asking for more food, expressing he is still hungry. Patient was given chips and vitamin water from his locker per his request. Overall patient is pleasant, withdrawn to self, showered and he is med compliant. Patient has an excellent appetite, denies pain and all psych symptoms even though noted to be responding. No behaviors noted and no prn's given this shift.    Vitals./84 (BP Location: Right arm)   Pulse 84   Temp 97.2  F (36.2  C) (Temporal)   Resp 16   Ht 1.981 m (6' 6\")   Wt 125.2 kg (276 lb 1.6 oz)   SpO2 100%   BMI 31.91 kg/m     "

## 2023-11-02 NOTE — PROGRESS NOTES
"[facility], [place]   Psychiatric Progress Note        Interim History:     The patient's care was discussed with the treatment team during the daily team meeting and/or staff's chart notes were reviewed.  Staff report patient slept for about 5.75 hours. He was seen intermittently in the lounge, watching TV with other patients, but had limited interactions with them. Spent a lot of time in his room, went to one group yesterday. Was still reported to be talking to self and attending to internal stimuli - smiling, then, laughing while looking into space. When pointed out to his symptoms and asked about them, continues to deny presence of hallucinations.    Met with patient: patient was seen in his room/bed. He was somnolent, but talked to me and was pretty pleasant. Admitted that he was spending a lot of time in his bed because \"there is nothing else to do here and I miss things outside. Is it not natural to miss being outside\"? I agreed with him that it was natural. Louisck again denied presence of Auditory hallucinations, Visual hallucinations, Suicidal ideation, Homicidal thoughts and confirmed that he would be perfectly safe to be outside in community: \"I would not be safe before, but now I am doing better and I will be safe\". He denied med side effects and had no more questions or concerns.          Medications:      fluticasone  1 spray Both Nostrils BID    fluticasone-vilanterol  1 puff Inhalation Daily    haloperidol  10 mg Oral At Bedtime    Or    haloperidol lactate  5 mg Intramuscular At Bedtime    haloperidol  5 mg Oral QAM    Or    haloperidol lactate  5 mg Intramuscular QAM    haloperidol decanoate  100 mg Intramuscular Q30 Days          Allergies:     Allergies   Allergen Reactions    No Known Drug Allergy           Labs:   No results found for this or any previous visit (from the past 24 hour(s)).       Psychiatric Examination:     /84 (BP Location: Right arm)   Pulse " "84   Temp 97.2  F (36.2  C) (Temporal)   Resp 16   Ht 1.981 m (6' 6\")   Wt 125.2 kg (276 lb 1.6 oz)   SpO2 100%   BMI 31.91 kg/m    Weight is 276 lbs 1.6 oz  Body mass index is 31.91 kg/m .    Orthostatic Vitals       None           Appearance: awake, alert and dressed in hospital scrubs  Attitude: more cooperative but also guarded  Eye Contact:  partial  Mood:  I am OK - looks sad  Affect:  mood congruent, restricted,   Speech:  decreased prosody, slow  Psychomotor Behavior:  no evidence of tardive dyskinesia, dystonia, or tics  Throught Process:  linear, concrete  Associations:  tighter  Thought Content:  patient appears to be responding to internal stimuli off and on, always denies experiencing Auditory hallucinations, See discussion above.   Insight:  partial  Judgement: impaired - improving?  Oriented to:  time, person, and place  Attention Span and Concentration: improving  Recent and Remote Memory:  fair    Clinical Global Impressions  First: 7/4 10/16/2023      Most recent:            Precautions:     Behavioral Orders   Procedures    Assault precautions    Code 1 - Restrict to Unit    Routine Programming     As clinically indicated    Self Injury Precaution    Status 15     Every 15 minutes.          DIagnoses:     Schizophrenia, schizoaffective, chronic with acute exacerbation          Plan:     Still experiences Auditory hallucinations, but med compliant, reality based. No agitation/violence. It might be possible that having low grade Auditory hallucinations is a new baseline. [facility] will start making IRTS referrals. Got 100 mg of Haldol Decanoate 10/19/2023. Evening haldol was increased on 10/30/2023. No medication changes today 11/2/2023. Will continue to provide support and structure. Will for now keep on No roommate order.    Total time spent was 35 minutes. Over 50% of times was spent counseling and coordination of care regarding coping skills, medication and discharge planning.          "

## 2023-11-02 NOTE — PLAN OF CARE
Problem: Sleep Disturbance  Goal: Adequate Sleep/Rest  Outcome: Progressing     Goal Outcome Evaluation:    Patient awake at start of shift. Later pt was observed sleeping during safety rounds check. Breathing quiet and unlabored when sleeping. Pt had no c/o pain or discomfort during the HS. Appears to have slept 5.75 hours.

## 2023-11-02 NOTE — PLAN OF CARE
BEH IP Unit Acuity Rating Score (UARS)  Patient is given one point for every criteria they meet.    CRITERIA SCORING   On a 72 hour hold, court hold, committed, stay of commitment, or revocation 1/1    Patient LOS on BEH unit exceeds 20 days 1/1  LOS: 20   Patient under guardianship, 55+, otherwise medically complex, or under age 11 0/1   Suicide ideation without relief of precipitating factors 0/1   Current plan for suicide 0/1   Current plan for homicide 0/1   Imminent risk or actual attempt to seriously harm another without relief of factors precipitating the attempt 0/1   Severe dysfunction in daily living (ex: complete neglect for self care, extreme disruption in vegetative function, extreme deterioration in social interactions) 0/1   Recent (last 7 days) or current physical aggression in the ED or on unit 0/1   Restraints or seclusion episode in past 72 hours 0/1   Recent (last 7 days) or current verbal aggression, agitation, yelling, etc., while in the ED or unit 0/1   Active psychosis 1/1   Need for constant or near constant redirection (from leaving, from others, etc).  0/1   Intrusive or disruptive behaviors 0/1   TOTAL 3

## 2023-11-02 NOTE — PLAN OF CARE
Team Note Due:  Monday    Assessment/Intervention/Current Symtoms and Care Coordination:  Chart review and met with team, discussed pt progress, symptomology, and response to treatment.  Discussed the discharge plan and any potential impediments to discharge.     Discharge Plan or Goal:  Current plan is to stabilize symptoms and develop safe disposition plan. Following hospitalization, plan is for pt to go to IRTS.      Barriers to Discharge:  At times, pt appears to respond to internal stimuli. Pt denies auditory hallucinations and reports symptoms have improved.     Referral Status:  Pt has psychiatrist in the community and is receiving home health services for support with injections and medication management. Follow-up psychiatry appointment has been scheduled - AVS updated.    CTC made referrals to the following IR programs:  [facility] Central Intake: Submitted 10/27/2023  Contact: Pilar Spain   Direct dial: [phone]/fax [phone]  Direct email: [email or web address]  General Email: [email or web address]  Includes:   [facility] Options [facility]  [facility]  Transitions on [place]  [facility]  Transfer Home  [facility]  [place] [facility]     [facility]: Submitted 10/27/2023  [facility]: [phone]  Fax: [phone]    [facility]: Submitted 10/27/2023  Phone: [phone]   Fax: [phone]  10/30/2023: Received voicemail from Kwasi noting receipt of referral. [facility]i reports that they are out about 3-4 weeks and they will add referral to list.     [facility]: Submitted 10/27/2023  Phone: [phone]    Fax: [phone]   email: [email or web address]  10/27/2023: Confirmed receipt of referral. No current openings but may have some coming up. Estimated between 2-3 weeks. Civil commitment paperwork can be shared once  further in the admission process.     Legal Status:  MI Commitment and Bolanos Order (PD revoked)   County: [facility]  File Number: [id number]  Start and expiration date of commitment: Start date is 9/28/2023   Bolanos Ordered Medications: Zyprexa, Haldol, Risperdal, Invega, Clozaril, Thorazine    Contacts:  Medication Management/Psychiatry:  Name/Clinic: Dr. Felipe Zamorano [facility]   Phone: [phone]    [facility] on 10/6/2023 Nicola Carias RN      /ACT Team:  Name: Maryam Miranda  Email: [email or web address]  Phone: [phone]      Family Contacts:   Name: Nydenia Rosales - mother (BUTCH on file)  Phone: [phone]    Name: Jostindioni Doe - father (BUTCH on file)  Phone: [phone]     Upcoming Meetings and Dates/Important Information and next steps:  - Follow-up with IRTS referrals

## 2023-11-03 PROCEDURE — 124N000002 HC R&B MH UMMC

## 2023-11-03 PROCEDURE — 99232 SBSQ HOSP IP/OBS MODERATE 35: CPT | Performed by: PSYCHIATRY & NEUROLOGY

## 2023-11-03 PROCEDURE — 250N000013 HC RX MED GY IP 250 OP 250 PS 637: Performed by: PSYCHIATRY & NEUROLOGY

## 2023-11-03 PROCEDURE — G0177 OPPS/PHP; TRAIN & EDUC SERV: HCPCS

## 2023-11-03 PROCEDURE — H2032 ACTIVITY THERAPY, PER 15 MIN: HCPCS

## 2023-11-03 RX ADMIN — HALOPERIDOL 5 MG: 5 TABLET ORAL at 08:30

## 2023-11-03 RX ADMIN — FLUTICASONE PROPIONATE 1 SPRAY: 50 SPRAY, METERED NASAL at 08:30

## 2023-11-03 RX ADMIN — FLUTICASONE PROPIONATE 1 SPRAY: 50 SPRAY, METERED NASAL at 19:58

## 2023-11-03 RX ADMIN — FLUTICASONE FUROATE AND VILANTEROL TRIFENATATE 1 PUFF: 200; 25 POWDER RESPIRATORY (INHALATION) at 08:30

## 2023-11-03 RX ADMIN — HALOPERIDOL 10 MG: 10 TABLET ORAL at 19:58

## 2023-11-03 ASSESSMENT — ACTIVITIES OF DAILY LIVING (ADL)
ADLS_ACUITY_SCORE: 28
ADLS_ACUITY_SCORE: 28
DRESS: SCRUBS (BEHAVIORAL HEALTH)
HYGIENE/GROOMING: INDEPENDENT
ADLS_ACUITY_SCORE: 28
DRESS: STREET CLOTHES;SCRUBS (BEHAVIORAL HEALTH);INDEPENDENT
ORAL_HYGIENE: INDEPENDENT
HYGIENE/GROOMING: HANDWASHING;SHOWER;INDEPENDENT
LAUNDRY: WITH SUPERVISION
ADLS_ACUITY_SCORE: 28
ORAL_HYGIENE: INDEPENDENT
ADLS_ACUITY_SCORE: 28

## 2023-11-03 NOTE — PLAN OF CARE
NOC Shift Report    Pt in bed at beginning of shift, breathing quiet and unlabored. Pt slept through shift. Pt slept 6.25 hours.     No pt complaints or concerns at this time.     No PRNs given. Will continue to monitor.

## 2023-11-03 NOTE — PLAN OF CARE
BEH IP Unit Acuity Rating Score (UARS)  Patient is given one point for every criteria they meet.    CRITERIA SCORING   On a 72 hour hold, court hold, committed, stay of commitment, or revocation 1/1    Patient LOS on BEH unit exceeds 20 days 1/1  LOS: 21   Patient under guardianship, 55+, otherwise medically complex, or under age 11 0/1   Suicide ideation without relief of precipitating factors 0/1   Current plan for suicide 0/1   Current plan for homicide 0/1   Imminent risk or actual attempt to seriously harm another without relief of factors precipitating the attempt 0/1   Severe dysfunction in daily living (ex: complete neglect for self care, extreme disruption in vegetative function, extreme deterioration in social interactions) 0/1   Recent (last 7 days) or current physical aggression in the ED or on unit 0/1   Restraints or seclusion episode in past 72 hours 0/1   Recent (last 7 days) or current verbal aggression, agitation, yelling, etc., while in the ED or unit 0/1   Active psychosis 1/1   Need for constant or near constant redirection (from leaving, from others, etc).  0/1   Intrusive or disruptive behaviors 0/1   TOTAL 3

## 2023-11-03 NOTE — PLAN OF CARE
"Goal Outcome Evaluation:    Patient is isolative in his room and withdrawn to self. Patient comes out during meals and observed on and off in the lounge watching TV. Patient keeps to himself with his headphones on at all times. Patient is dismissive with assessment questions, asking \"should I not be fine?\". At dinner time patient ate 100% and was asking for more food. Patient is overall pleasant on approach, and med compliant. pt denies AH/VH, and did not appear to be responding to internal stimuli this shift.    Vitals./76 (BP Location: Right arm)   Pulse 79   Temp 97.6  F (36.4  C) (Temporal)   Resp 16   Ht 1.981 m (6' 6\")   Wt 125.2 kg (276 lb 1.6 oz)   SpO2 100%   BMI 31.91 kg/m       "

## 2023-11-03 NOTE — CARE PLAN
11/03/23 1420   Group Therapy Session   Group Attendance attended group session   Time Session Began 1300   Time Session Ended 1345   Total Time (minutes) 45   Total # Attendees 4   Group Type life skill;other (see comments)  (OT)   Group Topic Covered balanced lifestyle;cognitive activities;leisure exploration/use of leisure time;structured socialization   Group Session Detail OT - Topic: Pt actively participated in a structured occupational therapy group with a focus on visuospatial problem solving and social engagement via a group game.    Patient Response/Contribution able to recall/repeat info presented;discussed personal experience with topic;expressed understanding of topic   Patient Participation Detail Pt demonstrated understanding of the novel 3-step task after an initial explanation. Pt remained engaged throughout the full duration of group. On occasion, he was smiling or laughing to himself. He also focused on making sure staff was going to leigh him as attending group.

## 2023-11-03 NOTE — PROGRESS NOTES
"[facility], [place]   Psychiatric Progress Note        Interim History:     The patient's care was discussed with the treatment team during the daily team meeting and/or staff's chart notes were reviewed.  Staff report patient slept for about 6.25 hours. He tends to sleep through the morning until lunch, early afternoon, then, gets up and spends some time outside of his room. He is compliant with his meds, rules. His behavior is completely appropriate, though, he is still being frequently witnessed attending to internal stimuli which he always denies.    Met with patient: patient was seen in his room/bed. He was laying with half closed eyes listening to rap music in his headphones. I told him that my son in his age also liked rap and hip-hop and Moe smiled. He again denied Suicidal ideation, Homicidal thoughts, Auditory hallucinations, Visual hallucinations. Confirmed that he spent so much time in his bed because of boredom, then, told me how much he was missing simple things outside, even such things as eating fast food. I informed him that CTC promised to talk today to IRTS places he was referred to and suggested to talk to her in late afternoon. Moe nodded and had no questions or concerns.          Medications:      fluticasone  1 spray Both Nostrils BID    fluticasone-vilanterol  1 puff Inhalation Daily    haloperidol  10 mg Oral At Bedtime    Or    haloperidol lactate  5 mg Intramuscular At Bedtime    haloperidol  5 mg Oral QAM    Or    haloperidol lactate  5 mg Intramuscular QAM    haloperidol decanoate  100 mg Intramuscular Q30 Days          Allergies:     Allergies   Allergen Reactions    No Known Drug Allergy           Labs:   No results found for this or any previous visit (from the past 24 hour(s)).       Psychiatric Examination:     /76 (BP Location: Right arm)   Pulse 79   Temp 97.6  F (36.4  C) (Temporal)   Resp 16   Ht 1.981 m (6' 6\")   Wt 125.2 kg (276 lb 1.6 " oz)   SpO2 100%   BMI 31.91 kg/m    Weight is 276 lbs 1.6 oz  Body mass index is 31.91 kg/m .    Orthostatic Vitals       None           Appearance: awake, alert and dressed in hospital scrubs  Attitude: more cooperative but also guarded  Eye Contact:  partial  Mood:  I am OK - looks sad  Affect:  mood congruent, restricted,   Speech:  decreased prosody, slow  Psychomotor Behavior:  no evidence of tardive dyskinesia, dystonia, or tics  Throught Process:  linear, concrete  Associations:  tighter  Thought Content:  patient appears to be responding to internal stimuli off and on, always denies experiencing Auditory hallucinations, See discussion above.   Insight:  partial  Judgement: impaired - improving?  Oriented to:  time, person, and place  Attention Span and Concentration: improving  Recent and Remote Memory:  fair    Clinical Global Impressions  First: 7/4 10/16/2023      Most recent:            Precautions:     Behavioral Orders   Procedures    Assault precautions    Code 1 - Restrict to Unit    Routine Programming     As clinically indicated    Self Injury Precaution    Status 15     Every 15 minutes.          DIagnoses:     Schizophrenia, schizoaffective, chronic with acute exacerbation          Plan:     Still experiences Auditory hallucinations, but med compliant, reality based. No agitation/violence. It might be possible that having low grade Auditory hallucinations is a new baseline. CTC will start making IRTS referrals. Got 100 mg of Haldol Decanoate 10/19/2023. Evening haldol was increased on 10/30/2023. No medication changes today 11/3/2023. Will continue to provide support and structure. Will for now keep on No roommate order. CTC will call to IRTS to inquire about progress with placement.     Total time spent was 35 minutes. Over 50% of times was spent counseling and coordination of care regarding coping skills, medication and discharge planning.

## 2023-11-03 NOTE — PLAN OF CARE
"Team Note Due:  Monday    Assessment/Intervention/Current Symtoms and Care Coordination:  Chart review and met with team, discussed pt progress, symptomology, and response to treatment.  Discussed the discharge plan and any potential impediments to discharge.    Tasks Completed:  - Called to check status of IRTS referrals.    - Met with pt to discuss discharge plans. Praised pt for spending more time out of his room and attending groups. [facility] shared updates regarding IRTS referrals and current waitlists. Shared that [facility] has been in communication with one IRTS more recently and does not know what their availability is yet. [facility] shared process for IRTS placement and shared that an interview call would happen with pt prior to admission. Pt did not have any questions.  - Morgan from Nora at [facility] noting that they are unable to access pt's insurance information as it is coming up as \"invalid\". [facility] reached out to financial counselors to look into this.  - Spoke to Jaison at [facility]. Jasion shared that they have immediate openings in [place] and would like to schedule an interview with pt. Jaison will call back today for interview. Pt notified of interview and reported that he is happy it is in [place] as the location is close to his home in [place].  - Morgan from Jaison at [facility]. Pt has been cleared for admission to their program following the phone interview. Tentative admission date of Tuesday, November 7. Jaison will send admission paperwork. Pt notified.    Discharge Plan or Goal:  Current plan is to stabilize symptoms and develop safe disposition plan. Following hospitalization, plan is for pt to go to IRTS.      Barriers to Discharge:  At times, pt appears to respond to internal stimuli. Pt denies auditory hallucinations and reports symptoms have improved.     Referral Status:  Pt has psychiatrist in the community and is receiving home health services for support with injections " and medication management. Follow-up psychiatry appointment has been scheduled - AVS updated.    [facility] made referrals to the following IRTS programs:  [facility] Central Intake: Submitted 10/27/2023  Contact: Pilar Spain   Direct dial: [phone]/fax [phone]  Direct email: [email or web address]  General Email: [email or web address]  Includes:   Community Options [facility]  [facility]  Transitions on [place]  [facility]  Transfer Home  [facility]  [place] [facility]   11/3/2023: Called and left message to confirm receipt of referral and to determine whether there are any current openings.    [facility]: Submitted 10/27/2023  [facility]: [phone]  Fax: [phone]  11/3/2023: Called and spoke to Nora to check status of referral. [facility] is still reviewing but is hopeful to follow-up later today. [facility] emailed asking for updated progress notes and insurance information. Sent via secure email and included progress notes from 10/31, 11/1, and 11/2.     [facility]: Submitted 10/27/2023  Phone: [phone]   Fax: [phone]  10/30/2023: Received voicemail from [facility] noting receipt of referral. [facility]i reports that they are out about 3-4 weeks and they will add referral to list.     [facility]: Submitted 10/27/2023  Phone: [phone]    Fax: [phone]   email: [email or web address]  10/27/2023: Confirmed receipt of referral. No current openings but may have some coming up. Estimated between 2-3 weeks. Civil commitment paperwork can be shared once further in the admission process.  11/3/2023: Called to check the status of the referral. Informed that pt is under review with the [facility]. Provided the name and number for the clinical director: Jaison - [phone]. Called and left a message for Jaison. Jaison called  back and shared that they have openings. Jaison will call pt today for an interview. Pt was cleared for admission. Tentative admission date for Tuesday, November 7.      Legal Status:  MI Commitment and Bolanos Order (PD revoked)   County: [facility]  File Number: [id number]  Start and expiration date of commitment: Start date is 9/28/2023   Bolanos Ordered Medications: Zyprexa, Haldol, Risperdal, Invega, Clozaril, Thorazine    Contacts:  Medication Management/Psychiatry:  Name/Clinic: Dr. Felipe Zamorano [facility]   Phone: [phone]    [facility] on 10/6/2023 Nicola Carias RN      /ACT Team:  Name: Maryam Miranda  Email: [email or web address]  Phone: [phone]      Family Contacts:   Name: Angeles Rosales - mother (BUTCH on file)  Phone: [phone]    Name: Jostin Nelson Maykaylin - father (BUTCH on file)  Phone: [phone]     Upcoming Meetings and Dates/Important Information and next steps:  - Follow-up with pt's  and parents to notify of IRTS admission

## 2023-11-03 NOTE — PLAN OF CARE
Goal Outcome Evaluation:    Plan of Care Reviewed With: patient        Pt spent all morning in his room resting in bed. Pt declined breakfast. Pt out for lunch and ate 100%. Pt observed in his room and appeared to have a very animated discussion in his room by him self . Pt was pleasant . On approach when in lounge. Pt was medication compliant with morning medications.

## 2023-11-04 PROCEDURE — 250N000013 HC RX MED GY IP 250 OP 250 PS 637: Performed by: PSYCHIATRY & NEUROLOGY

## 2023-11-04 PROCEDURE — 124N000002 HC R&B MH UMMC

## 2023-11-04 RX ADMIN — FLUTICASONE PROPIONATE 1 SPRAY: 50 SPRAY, METERED NASAL at 19:21

## 2023-11-04 RX ADMIN — FLUTICASONE PROPIONATE 1 SPRAY: 50 SPRAY, METERED NASAL at 08:53

## 2023-11-04 RX ADMIN — HALOPERIDOL 10 MG: 10 TABLET ORAL at 19:21

## 2023-11-04 RX ADMIN — FLUTICASONE FUROATE AND VILANTEROL TRIFENATATE 1 PUFF: 200; 25 POWDER RESPIRATORY (INHALATION) at 08:54

## 2023-11-04 RX ADMIN — HALOPERIDOL 5 MG: 5 TABLET ORAL at 08:52

## 2023-11-04 ASSESSMENT — ACTIVITIES OF DAILY LIVING (ADL)
ORAL_HYGIENE: INDEPENDENT
ADLS_ACUITY_SCORE: 28
HYGIENE/GROOMING: INDEPENDENT
ADLS_ACUITY_SCORE: 28
LAUNDRY: WITH SUPERVISION
ADLS_ACUITY_SCORE: 28
DRESS: SCRUBS (BEHAVIORAL HEALTH);INDEPENDENT
ADLS_ACUITY_SCORE: 28

## 2023-11-04 NOTE — CARE PLAN
11/03/23 2100   Group Therapy Session   Group Attendance attended group session   Time Session Began 1700   Time Session Ended 1745   Total Time (minutes) 45   Total # Attendees 4   Group Type expressive therapy   Group Topic Covered emotions/expression   Patient Response/Contribution cooperative with task     Art Therapy directive was to create group collaborative drawings by moving around the room and contributing to each art piece using a variety of art media.  Goals of directive: to assess how individual functions within a group dynamic, social interest, emotional expression, emotional regulation, media exploration.  Pt was an engaged participant, chose to work on his own art rather than contribute to group drawings/process. Pt finished a drawing and briefly shared with group. Pt chris a television and a video game console and wrote down what he described as a list of imaginary TV channels representing pts interests, primarily sports and entertainment.  Pts mood was calm, did not engage in group discussion/verbal processing.

## 2023-11-04 NOTE — PLAN OF CARE
Goal Outcome Evaluation:       Patient up for breakfast, he was medication compliant. Ate 100 % of meal. Little to say to staff or other peers. One word answers, denies all mental health issues. Flat tense affect, mood congruent. In room most of am shift, withdrawn to self. Declined check in. VSS, refused ortho static Bp

## 2023-11-04 NOTE — PLAN OF CARE
"Problem: Psychotic Signs/Symptoms  Goal: Improved Behavioral Control (Psychotic Signs/Symptoms)  Outcome: Cindi Rosa is quiet, withdrawn. He does spend time outside of his room, but he keeps to himself. During mental health check in, he is guarded and evasive. When I asked pt about his mood, he says, \"why would there be something wrong with my mood?\" When I asked pt about AH/VH, pt says, \"why are you asking me that? Why does everyone say that I talk to myself? I don't talk to myself.\" Pt denies all mental health symptoms. Pt responds to internal stimuli while in the milieu and when in his room. Pt can be heard talking to himself in his room. Affect is flat. Eye contact is poor. Appetite is good. Pt spent the shift watching TV in the milieu, listening to music, and resting in his room.    Pt was medication compliant and maintains behavioral control.  " Message noted, thank you for the update.

## 2023-11-04 NOTE — PLAN OF CARE
"Goal Outcome Evaluation:    Patient had no c/o pain or psych symptoms this shift.  Out on the unit with his headphones on, and keeps to himself.  Patient is pleasant on approach, presents with a flat affect and mood is calm.  Patient denied, while in his room he appeared to be responding to internal stimuli.  Patient did not demonstrate delusional thinking.  Patient was quite odorous, and did not shower this shift.  Med compliant and received no PRN's this shift.  Safety checks completed as ordered.  Appeared to be comfortable and stable this shift, will continue to monitor.   Vitals./75 (BP Location: Right arm, Patient Position: Sitting, Cuff Size: Adult Large)   Pulse 91   Temp 98.4  F (36.9  C) (Oral)   Resp 16   Ht 1.981 m (6' 6\")   Wt 125.2 kg (276 lb 1.6 oz)   SpO2 98%   BMI 31.91 kg/m   .                   "

## 2023-11-05 PROCEDURE — 124N000002 HC R&B MH UMMC

## 2023-11-05 PROCEDURE — 250N000013 HC RX MED GY IP 250 OP 250 PS 637: Performed by: PSYCHIATRY & NEUROLOGY

## 2023-11-05 RX ADMIN — HALOPERIDOL 5 MG: 5 TABLET ORAL at 08:07

## 2023-11-05 RX ADMIN — HALOPERIDOL 10 MG: 10 TABLET ORAL at 20:11

## 2023-11-05 RX ADMIN — FLUTICASONE PROPIONATE 1 SPRAY: 50 SPRAY, METERED NASAL at 08:08

## 2023-11-05 RX ADMIN — FLUTICASONE FUROATE AND VILANTEROL TRIFENATATE 1 PUFF: 200; 25 POWDER RESPIRATORY (INHALATION) at 08:08

## 2023-11-05 RX ADMIN — FLUTICASONE PROPIONATE 1 SPRAY: 50 SPRAY, METERED NASAL at 20:11

## 2023-11-05 ASSESSMENT — ACTIVITIES OF DAILY LIVING (ADL)
ADLS_ACUITY_SCORE: 28
ORAL_HYGIENE: INDEPENDENT
ADLS_ACUITY_SCORE: 28
ADLS_ACUITY_SCORE: 28
LAUNDRY: WITH SUPERVISION
HYGIENE/GROOMING: SHOWER;INDEPENDENT
DRESS: SCRUBS (BEHAVIORAL HEALTH);INDEPENDENT
ADLS_ACUITY_SCORE: 28

## 2023-11-05 NOTE — PLAN OF CARE
Problem: Adult Behavioral Health Plan of Care  Goal: Adheres to Safety Considerations for Self and Others  Outcome: Progressing     Problem: Sleep Disturbance  Goal: Adequate Sleep/Rest  Outcome: Progressing  Pt appears sleeping during safety rounds. Breathing is even and unlabored. Pt is registered sleeping for 7.5 hours. He had an uneventful night.

## 2023-11-05 NOTE — PLAN OF CARE
Goal Outcome Evaluation:     Patient has been in his bed all through out am.  He refused breakfast will encourage lunch. He was medication compliant little to no interaction. Laying in bed withdrawn to self. Flat, tense affect mood congruent .Poor eye contact looks away when asked questions.

## 2023-11-05 NOTE — PLAN OF CARE
Problem: Adult Behavioral Health Plan of Care  Goal: Adheres to Safety Considerations for Self and Others  Outcome: Progressing     Problem: Suicidal Behavior  Goal: Suicidal Behavior is Absent or Managed  Outcome: Progressing  Pt is visible in the unit intermittently. Spends majority of the shift in his room. He keeps to himself and does not engage or socialize with peers. Pt is minimally engage when writer attempted to check-in with pt. He kind of stared at writer briefly and did not say a word. He acknowledged questions with a nod or head shake. He denies suicidal and homicidal ideations. He denies pain. Had shower before dinner. Ate 100%. Med compliant. Visit with his father went well. His dad brought him some drinks/snacks that he accessed at snack time.   Pt is appropriate and no behavioral issues noted/reported.  He is observed sitting on the floor in his room by his bed wearing a headphone.

## 2023-11-06 PROCEDURE — 124N000002 HC R&B MH UMMC

## 2023-11-06 PROCEDURE — 90686 IIV4 VACC NO PRSV 0.5 ML IM: CPT | Performed by: PSYCHIATRY & NEUROLOGY

## 2023-11-06 PROCEDURE — 99232 SBSQ HOSP IP/OBS MODERATE 35: CPT | Performed by: PSYCHIATRY & NEUROLOGY

## 2023-11-06 PROCEDURE — 250N000013 HC RX MED GY IP 250 OP 250 PS 637: Performed by: PSYCHIATRY & NEUROLOGY

## 2023-11-06 PROCEDURE — G0008 ADMIN INFLUENZA VIRUS VAC: HCPCS | Performed by: PSYCHIATRY & NEUROLOGY

## 2023-11-06 PROCEDURE — 250N000011 HC RX IP 250 OP 636: Performed by: PSYCHIATRY & NEUROLOGY

## 2023-11-06 RX ORDER — HALOPERIDOL 10 MG/1
10 TABLET ORAL AT BEDTIME
Qty: 30 TABLET | Refills: 1 | Status: SHIPPED | OUTPATIENT
Start: 2023-11-06

## 2023-11-06 RX ORDER — HYDROXYZINE HYDROCHLORIDE 25 MG/1
25 TABLET, FILM COATED ORAL EVERY 4 HOURS PRN
Qty: 45 TABLET | Refills: 1 | Status: SHIPPED | OUTPATIENT
Start: 2023-11-06

## 2023-11-06 RX ORDER — FLUTICASONE FUROATE AND VILANTEROL 200; 25 UG/1; UG/1
1 POWDER RESPIRATORY (INHALATION) DAILY
Qty: 28 EACH | Refills: 0 | Status: SHIPPED | OUTPATIENT
Start: 2023-11-07 | End: 2023-11-06

## 2023-11-06 RX ORDER — ERGOCALCIFEROL 1.25 MG/1
50000 CAPSULE, LIQUID FILLED ORAL WEEKLY
Qty: 4 CAPSULE | Refills: 1 | Status: SHIPPED | OUTPATIENT
Start: 2023-11-06

## 2023-11-06 RX ORDER — FLUTICASONE PROPIONATE 50 MCG
1 SPRAY, SUSPENSION (ML) NASAL 2 TIMES DAILY
Qty: 16 G | Refills: 0 | Status: SHIPPED | OUTPATIENT
Start: 2023-11-06

## 2023-11-06 RX ORDER — BUDESONIDE AND FORMOTEROL FUMARATE DIHYDRATE 160; 4.5 UG/1; UG/1
2 AEROSOL RESPIRATORY (INHALATION) 2 TIMES DAILY
Qty: 6 G | Refills: 0 | Status: SHIPPED | OUTPATIENT
Start: 2023-11-06

## 2023-11-06 RX ORDER — HALOPERIDOL DECANOATE 100 MG/ML
100 INJECTION INTRAMUSCULAR
Start: 2023-11-17 | End: 2023-11-06

## 2023-11-06 RX ORDER — HALOPERIDOL 5 MG/1
5 TABLET ORAL EVERY MORNING
Qty: 30 TABLET | Refills: 1 | Status: SHIPPED | OUTPATIENT
Start: 2023-11-07

## 2023-11-06 RX ORDER — SENNOSIDES A AND B 8.6 MG/1
8.6 TABLET, FILM COATED ORAL 2 TIMES DAILY PRN
Qty: 60 TABLET | Refills: 0 | Status: SHIPPED | OUTPATIENT
Start: 2023-11-06

## 2023-11-06 RX ORDER — TRAZODONE HYDROCHLORIDE 50 MG/1
50 TABLET, FILM COATED ORAL
Qty: 30 TABLET | Refills: 1 | Status: SHIPPED | OUTPATIENT
Start: 2023-11-06

## 2023-11-06 RX ORDER — OLANZAPINE 10 MG/1
10 TABLET ORAL 3 TIMES DAILY PRN
Qty: 60 TABLET | Refills: 1 | Status: SHIPPED | OUTPATIENT
Start: 2023-11-06

## 2023-11-06 RX ORDER — HALOPERIDOL DECANOATE 100 MG/ML
100 INJECTION INTRAMUSCULAR
Qty: 1 ML | Refills: 1 | Status: SHIPPED | OUTPATIENT
Start: 2023-11-17

## 2023-11-06 RX ADMIN — FLUTICASONE PROPIONATE 1 SPRAY: 50 SPRAY, METERED NASAL at 20:45

## 2023-11-06 RX ADMIN — HALOPERIDOL 5 MG: 5 TABLET ORAL at 08:30

## 2023-11-06 RX ADMIN — INFLUENZA A VIRUS A/VICTORIA/4897/2022 IVR-238 (H1N1) ANTIGEN (FORMALDEHYDE INACTIVATED), INFLUENZA A VIRUS A/DARWIN/9/2021 SAN-010 (H3N2) ANTIGEN (FORMALDEHYDE INACTIVATED), INFLUENZA B VIRUS B/PHUKET/3073/2013 ANTIGEN (FORMALDEHYDE INACTIVATED), AND INFLUENZA B VIRUS B/MICHIGAN/01/2021 ANTIGEN (FORMALDEHYDE INACTIVATED) 0.5 ML: 15; 15; 15; 15 INJECTION, SUSPENSION INTRAMUSCULAR at 17:11

## 2023-11-06 RX ADMIN — HALOPERIDOL 10 MG: 10 TABLET ORAL at 20:45

## 2023-11-06 RX ADMIN — FLUTICASONE PROPIONATE 1 SPRAY: 50 SPRAY, METERED NASAL at 08:31

## 2023-11-06 RX ADMIN — FLUTICASONE FUROATE AND VILANTEROL TRIFENATATE 1 PUFF: 200; 25 POWDER RESPIRATORY (INHALATION) at 08:31

## 2023-11-06 ASSESSMENT — ACTIVITIES OF DAILY LIVING (ADL)
LAUNDRY: WITH SUPERVISION
LAUNDRY: WITH SUPERVISION
ADLS_ACUITY_SCORE: 28
ORAL_HYGIENE: INDEPENDENT
ADLS_ACUITY_SCORE: 28
ADLS_ACUITY_SCORE: 28
DRESS: INDEPENDENT;STREET CLOTHES
ADLS_ACUITY_SCORE: 28
HYGIENE/GROOMING: INDEPENDENT
ADLS_ACUITY_SCORE: 28
ORAL_HYGIENE: INDEPENDENT
ADLS_ACUITY_SCORE: 28
HYGIENE/GROOMING: SHOWER;INDEPENDENT
DRESS: INDEPENDENT

## 2023-11-06 NOTE — PROVIDER NOTIFICATION
11/06/23 1601   Individualization/Patient Specific Goals   Patient Personal Strengths expressive of emotions;expressive of needs;family/social support;resilient   Patient Vulnerabilities lacks insight into illness;poor impulse control   Anxieties, Fears or Concerns At times, pt continues to respond to internal stimuli.   Individualized Care Needs Medication management and care coordination   Interprofessional Rounds   Summary Pt spent time in the milieu engaging with peers. Pt responds briefly with staff assessments but is not often engaged. No notes of responding to internal stimuli over the evening shift. Pt has been approved for admission to [facility] with tentative admission date for tomorrow.   Participants CTC;psychiatrist;nursing;OT   Behavioral Team Discussion   Participants Nic Morgan MD; Gina Casalenda, RN; CHAPO Keith; Sunshine Burns OTR/L   Progress Pt progresses toward his goals and appears to be at baseline. Pt has been approved for admission to [facility] for tomorrow morning.   Anticipated length of stay Pt will discharge tomorrow.   Continued Stay Criteria/Rationale N/A   Medical/Physical No issues noted   Precautions SIB and assault precautions   Plan Multidisciplinary team evaluation, medication management, care coordination   Rationale for change in precautions or plan N/A   Safety Plan Per unit protocol   Anticipated Discharge Disposition [facility]     PRECAUTIONS AND SAFETY    Behavioral Orders   Procedures    Assault precautions    Code 1 - Restrict to Unit    Routine Programming     As clinically indicated    Self Injury Precaution    Status 15     Every 15 minutes.       Safety  Safety WDL: WDL  Patient Location: dining room, lounge, patient room, own, hallway  Observed Behavior: calm, sitting, walking  Observed Behavior (Comment): Watching television, listening to headphones, resting in bed  Safety Measures: safety rounds completed  Diversional Activity: television,  music  De-Escalation Techniques: appropriate behavior reinforced, diversional activity encouraged, medication administered  Suicidality: Status 15, Minimal furniture in room, Minimal personal belongings in room, Unpredictable frequency of checking on patient  Assault: status 15, private room, minimal furniture in room, minimal personal belongings in room  Elopement Assessment: Distress about legal situation (holds for mental health or criminal), Statements about wanting to leave  Elopement Interventions: status 15  Sexual: status 15  Additional Documentation:  (SIB Precautions in place.)

## 2023-11-06 NOTE — PROGRESS NOTES
"[facility], [place]   Psychiatric Progress Note        Interim History:     The patient's care was discussed with the treatment team during the daily team meeting and/or staff's chart notes were reviewed.  Staff report patient continued with his typical behavior over weekend. He has been spending mornings in his bed, then, getting outside participating in the afternoon, evening groups, socializing with select patients on the floor. Compliant with meds. Continued to deny presence of all MI symptoms, but per staff report he continues to talk to self/someone else while being alone in his room. Compliant with meds. Slept for about 7 hours last night.     Met with patient: patient was seen in his room/bed. He was laying there with headphones on, put them away and talked to me. Said that his mood was OK and that he was happy that he was approved to go to [facility] in [place] with tentative discharge plan as early as tomorrow. Thanked me and treatment team for recommendation to be patient: \"I was and got good results\". I made him aware that tomorrow discharge date was tentative and we would let him know if we had any other information. Pt denied Auditory hallucinations, Visual hallucinations, Suicidal ideation, Homicidal thoughts. He didn't voice any delusional ideas and had no questions or concerns.          Medications:      fluticasone  1 spray Both Nostrils BID    fluticasone-vilanterol  1 puff Inhalation Daily    haloperidol  10 mg Oral At Bedtime    Or    haloperidol lactate  5 mg Intramuscular At Bedtime    haloperidol  5 mg Oral QAM    Or    haloperidol lactate  5 mg Intramuscular QAM    haloperidol decanoate  100 mg Intramuscular Q30 Days          Allergies:     Allergies   Allergen Reactions    No Known Drug Allergy           Labs:   No results found for this or any previous visit (from the past 24 hour(s)).       Psychiatric Examination:     /76 (BP Location: Left arm, Patient " "Position: Sitting)   Pulse 89   Temp 98.1  F (36.7  C) (Oral)   Resp 16   Ht 1.981 m (6' 6\")   Wt 125.2 kg (276 lb 1.6 oz)   SpO2 100%   BMI 31.91 kg/m    Weight is 276 lbs 1.6 oz  Body mass index is 31.91 kg/m .    Orthostatic Vitals         Most Recent      Sitting Orthostatic /76 11/06 1145    Sitting Orthostatic Pulse (bpm) 89 11/06 1145    Standing Orthostatic /74 11/06 1145    Standing Orthostatic Pulse (bpm) 94 11/06 1145           Appearance: awake, alert and dressed in hospital scrubs  Attitude: more cooperative but also guarded  Eye Contact:  partial  Mood:  I am OK - subjectively, looks sad  Affect:  mood congruent, restricted,   Speech:  decreased prosody, slow  Psychomotor Behavior:  no evidence of tardive dyskinesia, dystonia, or tics  Throught Process:  linear, concrete  Associations:  tighter  Thought Content:  patient appears to be responding to internal stimuli off and on, always denies experiencing Auditory hallucinations, See discussion above.   Insight:  partial  Judgement: impaired - improving?  Oriented to:  time, person, and place  Attention Span and Concentration: improving  Recent and Remote Memory:  fair    Clinical Global Impressions  First: 7/4 10/16/2023      Most recent:            Precautions:     Behavioral Orders   Procedures    Assault precautions    Code 1 - Restrict to Unit    Routine Programming     As clinically indicated    Self Injury Precaution    Status 15     Every 15 minutes.          DIagnoses:     Schizophrenia, schizoaffective, chronic with acute exacerbation          Plan:     Still experiences Auditory hallucinations, but med compliant, reality based. No agitation/violence. It might be possible that having low grade Auditory hallucinations is a new baseline. He was accepted to [facility] with tentative discharge tomorrow. Got 100 mg of Haldol Decanoate 10/19/2023. Evening haldol was increased on 10/30/2023. No medication changes today " 11/6/2023. Will continue to provide support and structure. Will for now keep on No roommate order. Discharge meds were reconciled and sent to discharge pharmacy.  Total time spent was 37 minutes. Over 50% of times was spent counseling and coordination of care regarding coping skills, medication and discharge planning.

## 2023-11-06 NOTE — PLAN OF CARE
Team Note Due:  Monday    Assessment/Intervention/Current Symtoms and Care Coordination:  Chart review and met with team, discussed pt progress, symptomology, and response to treatment.  Discussed the discharge plan and any potential impediments to discharge.    Tasks Completed:  - Printed off admission paperwork for pt and collaborated with nurse, pt, and provider to complete.   - Called pt's  to notify of admission and tentative admission date for tomorrow.  reported some concern regarding placement in [place] due to pt's brother living locally and reportedly not being a positive influence on pt.  will notify the team at the [facility] of this information.  is in agreement with discharge plan.   - Attempted to call pt's mother to notify of discharge plans. However, voicemail box was full and unable to leave message.   - Left message for pt's dad to notify of discharge plans. Pt's dad called back and was notified by [facility] of tentative admission to [facility] tomorrow. Pt's dad was happy to hear about location of placement as it is close to his home. Pt's dad in agreement with discharge plans and would like to be notified once admission date and time has been confirmed.  - Completed provisional discharge agreement and went over with pt. Pt did not have any questions and signed the form. Sent to pt's  for signature.  - Pt's mom called [facility] and provided update regarding discharge plan and tentative admission date to [facility]. Pt's mom is in agreement with discharge plan. [facility] shared more information about program with pt's mom to look over and will follow-up once admission has been confirmed.  - Confirmed with [facility] that they are able to provide pt with their upcoming injection. Provider updated and injection will be sent with pt to I[facility].  - Submitted completed admissions paperwork via secure email to Jaison at [facility].  - Placed request with care  coordinators for 10:30 AM discharge tomorrow morning.  - Called [facility] to confirm admission to the program tomorrow at 11 AM.  - Called and spoke to pt's mom and dad to confirm admission tomorrow morning at 11 AM.    Discharge Plan or Goal:  Plan is for pt to discharge to [facility] tomorrow morning with outpatient supports.     Barriers to Discharge:  None. Pt will discharge to [facility] tomorrow morning.     Referral Status:  Pt has psychiatrist in the community and is receiving home health services for support with injections and medication management. Follow-up psychiatry appointment has been scheduled - AVS updated.    CTC made referrals to the following IRTS programs:  [facility] Central Intake: Submitted 10/27/2023  Contact: Pilar Spain,   Direct dial: [phone]/fax [phone]  Direct email: [email or web address]  General Email: [email or web address]  Includes:   [facility]  [facility]  [facility]  [facility]  [facility]  [facility]  [facility]   11/3/2023: Called and left message to confirm receipt of referral and to determine whether there are any current openings.    [facility]: Submitted 10/27/2023  [facility]: [phone]  Fax: [phone]  11/3/2023: Called and spoke to Nora to check status of referral. Nora is still reviewing but is hopeful to follow-up later today. Nora emailed asking for updated progress notes and insurance information. Sent via secure email and included progress notes from 10/31, 11/1, and 11/2.     [facility]: Submitted 10/27/2023  Phone: [phone]   Fax: [phone]  10/30/2023: Received voicemail from Kwasi noting receipt of referral. Kwasi reports that they are out about 3-4 weeks and they will add referral to list.     [facility]:  Submitted 10/27/2023  Phone: [phone]    Fax: [phone]   email: [email or web address]  10/27/2023: Confirmed receipt of referral. No current openings but may have some coming up. Estimated between 2-3 weeks. Civil commitment paperwork can be shared once further in the admission process.  11/3/2023: Called to check the status of the referral. Informed that pt is under review with the [facility]. Provided the name and number for the clinical director: Jaison - [phone]. Called and left a message for Jaison. Jaison called back and shared that they have openings. Jaison will call pt today for an interview. Pt was cleared for admission. Tentative admission date for Tuesday, November 7.   11/6/2023: Admission paperwork submitted to the program. Admission is scheduled for 11 AM on Tuesday, November 7. Transportation will pick pt up at 10:30 to bring to the program.     Legal Status:  MI Commitment and [place] Order (PD revoked)   County: [facility]  File Number: [id number]  Start and expiration date of commitment: Start date is 9/28/2023   [place] Ordered Medications: Zyprexa, Haldol, Risperdal, Invega, Clozaril, Thorazine    Contacts:  Medication Management/Psychiatry:  Name/Clinic: Dr. Felipe Zamorano [facility]   Phone: [phone]    [facility] on 10/6/2023 Nicola Carias RN      /ACT Team:  Name: Maryam Miranda  Email: [email or web address]  Phone: [phone]      Family Contacts:   Name: Nysherlyncatarina Connie - mother (BUTCH on file)  Phone: [phone]    Name: Jostin Doe - father (BUTCH on file)  Phone: [phone]     Upcoming Meetings and Dates/Important Information and next steps:  None

## 2023-11-06 NOTE — PLAN OF CARE
BEH IP Unit Acuity Rating Score (UARS)  Patient is given one point for every criteria they meet.    CRITERIA SCORING   On a 72 hour hold, court hold, committed, stay of commitment, or revocation 1/1    Patient LOS on BEH unit exceeds 20 days 1/1  LOS: 24   Patient under guardianship, 55+, otherwise medically complex, or under age 11 0/1   Suicide ideation without relief of precipitating factors 0/1   Current plan for suicide 0/1   Current plan for homicide 0/1   Imminent risk or actual attempt to seriously harm another without relief of factors precipitating the attempt 0/1   Severe dysfunction in daily living (ex: complete neglect for self care, extreme disruption in vegetative function, extreme deterioration in social interactions) 0/1   Recent (last 7 days) or current physical aggression in the ED or on unit 0/1   Restraints or seclusion episode in past 72 hours 0/1   Recent (last 7 days) or current verbal aggression, agitation, yelling, etc., while in the ED or unit 0/1   Active psychosis 1/1   Need for constant or near constant redirection (from leaving, from others, etc).  0/1   Intrusive or disruptive behaviors 0/1   TOTAL 3

## 2023-11-06 NOTE — PLAN OF CARE
Problem: Adult Behavioral Health Plan of Care  Goal: Adheres to Safety Considerations for Self and Others  Intervention: Develop and Maintain Individualized Safety Plan  Recent Flowsheet Documentation  Taken 11/6/2023 1145 by Casalenda, Gina R, RN  Safety Measures: safety rounds completed     Problem: Suicidal Behavior  Goal: Suicidal Behavior is Absent or Managed  Outcome: Progressing  Flowsheets (Taken 11/6/2023 1450)  Mutually Determined Action Steps (Suicidal Behavior Absent/Managed): verbalizes safety check rationale     Pt denies anxiety and depression. He denies SI/SIB/HI/AH/VH. Pt not observed responding overtly to internal stimuli this shift. Pt contracts for safety. Pt denies pain. A/O. VSS.     Pt presents with a flat affect. Pt observed to be calm and cooperative. Pt observed to be pleasant and polite. Pt observed to be guarded. Pt observed to be withdrawn. Pt keeps to himself while out on the unit but will speak with peers if peers talk to him first.     Pt did not get up at breakfast time to eat; however, pt did accept morning medications from writer. Pt observed to be sleeping majority of the morning; however, pt did get up around 1130 and ate his breakfast in the dining area. Shortly afterward, pt approached writer to have VS taken. Pt reports he is happy that he will be discharging tomorrow. When peers asked him about discharge, pt observed to be smiling while talking to peers about it. Pt visible on unit attending lunch, watching television, and listening to headphones. Pt showered this afternoon. Pt attended groups. Pt visible intermittently resting in his room.     Pt is medication compliant. He denies any medication SE at this time. Pt requested to have influenza vaccine @ 1700 today - order placed. Writer will notify oncoming shift of pt's request. Pt will be discharging tomorrow @ 1030 - see CTC note for further details. Discharge order placed by provider. Writer notified discharge pharmacy  to ensure medications will arrive to unit prior. Pt denies any additional questions or concerns at this time. He continues to have a no roommate order. He remains on assault and SIB precautions - no associated behaviors noted this shift. Will continue to monitor and assist as needed.

## 2023-11-07 VITALS
BODY MASS INDEX: 32.4 KG/M2 | TEMPERATURE: 97.5 F | WEIGHT: 280 LBS | HEIGHT: 78 IN | DIASTOLIC BLOOD PRESSURE: 80 MMHG | OXYGEN SATURATION: 100 % | HEART RATE: 77 BPM | SYSTOLIC BLOOD PRESSURE: 125 MMHG | RESPIRATION RATE: 16 BRPM

## 2023-11-07 PROCEDURE — 99239 HOSP IP/OBS DSCHRG MGMT >30: CPT | Performed by: PSYCHIATRY & NEUROLOGY

## 2023-11-07 PROCEDURE — 250N000013 HC RX MED GY IP 250 OP 250 PS 637: Performed by: PSYCHIATRY & NEUROLOGY

## 2023-11-07 RX ADMIN — HALOPERIDOL 5 MG: 5 TABLET ORAL at 07:38

## 2023-11-07 RX ADMIN — FLUTICASONE FUROATE AND VILANTEROL TRIFENATATE 1 PUFF: 200; 25 POWDER RESPIRATORY (INHALATION) at 07:38

## 2023-11-07 RX ADMIN — FLUTICASONE PROPIONATE 1 SPRAY: 50 SPRAY, METERED NASAL at 07:38

## 2023-11-07 ASSESSMENT — ACTIVITIES OF DAILY LIVING (ADL)
ADLS_ACUITY_SCORE: 28
ADLS_ACUITY_SCORE: 28
ORAL_HYGIENE: INDEPENDENT
HYGIENE/GROOMING: INDEPENDENT
ADLS_ACUITY_SCORE: 28
LAUNDRY: WITH SUPERVISION
ADLS_ACUITY_SCORE: 28
ADLS_ACUITY_SCORE: 28
DRESS: INDEPENDENT;STREET CLOTHES

## 2023-11-07 NOTE — PROGRESS NOTES
Discharge orders are received.  Writer reviewed and discussed discharge instructions, follow-up care, future appointments, and medication administration with pt. Pt acknowledges an understanding.  Patient continues to deny SI/SIB/HI/AH/VH. Pt contracts for safety within the community. Pt verbalized understanding of discharge instructions. Pt received personal belongings.  All forms are signed. Pt's haldol decanoate injection did not arrive prior to pt's discharge. Writer spoke with discharge pharmacy and informed them to bring to unit when it is ready so that HUC can  medication to [facility]. Per CTC, I[facility]S notified that haldol decanoate injection will need to be couriered when it is ready from discharge pharmacy. Pt denies any questions or concerns at this time. Patient to discharge to I[facility]. Pt discharged around 1040 via cab. Writer escorted pt out of the building.

## 2023-11-07 NOTE — DISCHARGE SUMMARY
"Psychiatric Discharge Summary    Moe Doe MRN# [id number]   Age: 22 year old YOB: 2000     Date of Admission:  10/13/2023  Date of Discharge:  11/7/2023  Admitting Physician:  Nic Morgan MD  Discharge Physician:  Nic Morgan MD (Contact: [phone])         Event Leading to Hospitalization:     22 year old male      Patient has a history of schizophrenia and is currently on a commitment. He was first admitted in 2021 with one year of worsening psychotic symptoms. Stabilized at that time with Haldol.     More recently patient was admitted to [facility] one month ago and discharged 2 weeks ago. He is currently on a commitment and is on Haldol Decanoate and was last due to get injection on Oct 6, so this will need clarification.    Patient was brought again to the ER on 10/9/23 due to worsening psychosis and increasing disorganization. There was concern of drug use and patient did admit to using Delta 8.      Patient remains very disorganized and psychotic. He is blocked and distracted. He can not coherently answer any of my questions and only makes delayed responses and nonsensical statements. He appears physically comfortable.      According to ER report:  GILBERT Doe is a 22 year old male with history of schizophrenia who presents for mental health evaluation and ear pain.  The patient states that he has been having L. ear pain for the last few days. The patient denies any drug or alcohol usage. States that he used to use \"delta 8\" and states that he hasn't used it in a long time. He admits to visual hallucinations \"I see smoke.\"  He states he wants to hurt himself by \"jumping on a couch.\"  He states \"I don't know if I want to die, maybe I do.\" No fever, cough or other symptoms.      Independent Historian:   The father states that he is worried about the patients safety and the safety of those around him. He states that the patient lives with his mom and today, mom called " father and asked him to be taken to ED. The father states that en route, the patient urinated on the curb outside as well as opened the car door while it was moving. He is worried that the patient is not sleeping and would like him to get psychiatric help. Patient was given haldol IM injection on 10/6 at home visit which he receives monthly.     Medical Decision Making:  Patient is a 22-year-old male presenting for mental health evaluation.  He also complains of left ear pain on arrival.  He is nontoxic, cooperative at bedside.  He has a flat affect and admits to visual hallucinations.  Father expresses that he tried to jump out of the car on the way here and expresses concern for mental health decompensation.  Dpubt metabolic derangements or toxidrome.  Patient was medically cleared.  Regarding his ear complaints, noted cerumen impaction.  I did discuss recommendation for irrigation of patient's ear though he is adamantly declining.  Father expressed understanding that cannot exclude underlying otitis media/perforation.  No evidence to suggest otitis externa/mastoiditis based on exam.  He is signed out to my partner pending DEC evaluation.  He is currently on an STERLING.    10/9/2023   Delilah Madison, DO      According to DEC assessment done in ER:  Referral Data and Chief Complaint  Moe Doe presents to the ED with family/friends. Patient is presenting to the ED for the following concerns: Verbal agitation, Suicidal ideation, Significant behavioral change.   Factors that make the mental health crisis life threatening or complex are:  Father brought pt into the ED after pt has had large change in presntation, with concerns for psychosis and suicidal ideation..     History of the Crisis   Information was attempted to be gathered from patient, though due to presentation and decompensation, information gathered was limited. Collateral from Commitment , parents and patient were obtained in addition to  observation with patient. This past evening, pt s mother called pt s father, requesting help bringing pt to ED due to decompensation in behavior. Since being in ED, pt has shown psychosis concerns such as making odd gestures, reporting visual hallucinations and inappropriately laughing. Pt also was reporting SI to ED staff and father.  Pt has been verbally abrasive and refusing most services and labs. During this encounter with LMHP, per nurse, pt had just eaten breakfast and was awake, though when writer went into room, pt placed hat on head, had eyes closed and refused to respond to information outside of  what is this . Pt became non responsive and looked as though he was sleeping. LMHP reattempted again with help of nurse. Pt was brought in snacks and seen sitting up, looking about the room and eating. When nurse was present, pt asked nurse if LMHP was  telling the truth  nurse encouraged him to participate. During this time, pt was asked about SI, pt reported no, then when asked about comments made to family and ED staff, pt then laughed and looked away, continuing to eat. When asked about HI pt stated  yes no, yes, no yes,no  and was unable to confirm answer. LMHP attempted to gain rapport with pt though was limited as pt was not willing to engage and would state he doesn t know where he lives. At one point, pt s father came in the room to help assist with pt to comply with DEC though pt then became slightly agitated stating  why is she asking me personal information  and stated  she is racists  in regards to LMHP. Pts father had to encourage him to participate. Once father left room, LMHP continued to attempt to with patient, though pt would respond with  none of your fucking business  or  you stupid fucking ass . LMHP then decided to observe patient, rather than asking questions due to agitation. Pt was seen looking about the room, then aggressively turned dead toward the wall and stated  I am going to find  you , pt then began inappropriately laughing and was seen engaging in hand movements, almost similar to ASL, though family denied pt knowing this. When LMHP asked what pt was doing, he began putting his finger over his mouth to gesture  shhh  and continued with his hand movements and looking about the room.       See Admission note by by admitting physician/nurse-practitioner for additional details.          DIagnoses:     Schizoaffective disorder, depressed with acute exacerbation vs paranoid schizophrenia         Labs:      Latest Reference Range & Units Most Recent 10/12/23 04:27 10/14/23 06:44   Sodium 135 - 145 mmol/L 142  10/9/23 12:57     Potassium 3.4 - 5.3 mmol/L 4.3  10/9/23 12:57     Chloride 98 - 107 mmol/L 106  10/9/23 12:57     Carbon Dioxide (CO2) 22 - 29 mmol/L 28  10/9/23 12:57     Urea Nitrogen 6.0 - 20.0 mg/dL 7.5  10/9/23 12:57     Creatinine 0.67 - 1.17 mg/dL 0.91  10/9/23 12:57     GFR Estimate >60 mL/min/1.73m2 >90  10/9/23 12:57     Calcium 8.6 - 10.0 mg/dL 9.2  10/9/23 12:57     Anion Gap 7 - 15 mmol/L 8  10/9/23 12:57     Albumin 3.5 - 5.2 g/dL 4.0  10/9/23 12:57     Protein Total 6.4 - 8.3 g/dL 7.0  10/9/23 12:57     Alkaline Phosphatase 40 - 129 U/L 73  10/9/23 12:57     ALT 0 - 70 U/L 21  10/9/23 12:57     AST 0 - 45 U/L 21  10/9/23 12:57     Bilirubin Total <=1.2 mg/dL 0.5  10/9/23 12:57     Cholesterol <200 mg/dL 147  10/14/23 06:44  147   Glucose 70 - 99 mg/dL 78  10/9/23 12:57     HDL Cholesterol >=40 mg/dL 35 (L)  10/14/23 06:44  35 (L)   Hemoglobin A1C <5.7 % 5.6  10/14/23 06:44  5.6   LDL Cholesterol Calculated <=100 mg/dL 88  10/14/23 06:44  88   Non HDL Cholesterol <130 mg/dL 112  10/14/23 06:44  112   Triglycerides <150 mg/dL 118  10/14/23 06:44  118   WBC 4.0 - 11.0 10e3/uL 8.2  10/9/23 12:57     Hemoglobin 13.3 - 17.7 g/dL 14.6  10/9/23 12:57     Hematocrit 40.0 - 53.0 % 44.2  10/9/23 12:57     Platelet Count 150 - 450 10e3/uL 291  10/9/23 12:57     RBC Count 4.40 - 5.90  10e6/uL 4.97  10/9/23 12:57     MCV 78 - 100 fL 89  10/9/23 12:57     MCH 26.5 - 33.0 pg 29.4  10/9/23 12:57     MCHC 31.5 - 36.5 g/dL 33.0  10/9/23 12:57     RDW 10.0 - 15.0 % 14.9  10/9/23 12:57     % Neutrophils % 53  10/9/23 12:57     % Lymphocytes % 30  10/9/23 12:57     % Monocytes % 10  10/9/23 12:57     % Eosinophils % 6  10/9/23 12:57     % Basophils % 1  10/9/23 12:57     Absolute Basophils 0.0 - 0.2 10e3/uL 0.1  10/9/23 12:57     Absolute Eosinophils 0.0 - 0.7 10e3/uL 0.5  10/9/23 12:57     Absolute Immature Granulocytes <=0.4 10e3/uL 0.0  10/9/23 12:57     Absolute Lymphocytes 0.8 - 5.3 10e3/uL 2.5  10/9/23 12:57     Absolute Monocytes 0.0 - 1.3 10e3/uL 0.8  10/9/23 12:57     % Immature Granulocytes % 0  10/9/23 12:57     Absolute Neutrophils 1.6 - 8.3 10e3/uL 4.4  10/9/23 12:57     Absolute NRBCs 10e3/uL 0.0  10/9/23 12:57     NRBCs per 100 WBC <1 /100 0  10/9/23 12:57     SARS CoV2 PCR Negative  Negative  10/9/23 12:57     Influenza A Negative  Negative  10/9/23 12:57     Influenza B Negative  Negative  10/9/23 12:57     Resp Syncytial Virus Negative  Negative  10/9/23 12:57     Amphetamine Qual Urine Screen Negative  Screen Negative  10/12/23 04:27 Screen Negative    Fentanyl Qual Urine Screen Negative  Screen Negative  10/12/23 04:27 Screen Negative    Cocaine Urine Screen Negative  Screen Negative  10/12/23 04:27 Screen Negative    Benzodiazepine Urine Screen Negative  Screen Negative  10/12/23 04:27 Screen Negative    Opiates Qualitative Urine Screen Negative  Screen Negative  10/12/23 04:27 Screen Negative    PCP Urine Screen Negative  Screen Negative  10/12/23 04:27 Screen Negative    Cannabinoids Qual Urine Screen Negative  Screen Positive !  10/12/23 04:27 Screen Positive !    Barbiturates Qual Urine Screen Negative  Screen Negative  10/12/23 04:27 Screen Negative    (L): Data is abnormally low  !: Data is abnormal         Consults:   No consultations were requested during this admission          Hospital Course:     Moe Doe was admitted to [facility] with attending Nic Morgan MD under an ongoing civil commitment. The patient was placed under status 15 (15 minute checks) to ensure patient safety. Moe presented as pretty pleasant and cooperative, so, his SIO was discontinued, but he was continued on No roommate order. Patient was reasonably open about his poor compliance with meds at home and frequently stated to myself and other members of treatment team that he would be more compliant with meds in the future. He spent a lot of time inside of his bed/room and appeared to be sad after hearing that his family was uncomfortable about his return home and would like him to go to [facility]. I talked to the patient's mother who confirmed me that. Moe repeatedly denied feeling depressed and always presence of Auditory hallucinations, although, was reported on a number of occasions talking to self or in space and clearly responding to internal stimuli. He, however, always said that it was not true, that he was rapping and not talking to self. Moe appeared to have enough self awareness into his symptoms to try to minimize them to speed up his discharge. He was started on oral Haldol, then, was given without any objection from patient shot of Haldol Decanoate. He didn't seem to be experiencing any side effects. Throughout his whole hospitalization there was no report of inappropriate behavior. He was hospitalized under involuntary commitment status. Patient was interviewed and accepted to [facility] and discharged there. On the day of discharge Moe thanked this treatment team and myself for care provided to him and again said that he would be compliant with meds after discharge. He denied Auditory hallucinations, Visual hallucinations, Suicidal ideation, Homicidal thoughts and had no questions or concerns.        Moe Doe did participate in groups and was visible in the milieu.     The patient's symptoms of  psychosis improved.     Moe Doe was released to  [facility] . At the time of discharge Moe Doe was determined to not be a danger to himself or others.          Discharge Medications:     Discharge Medication List as of 11/7/2023  9:03 AM        START taking these medications    Details   !! haloperidol (HALDOL) 10 MG tablet Take 1 tablet (10 mg) by mouth at bedtime, Disp-30 tablet, R-1, E-Prescribe      !! haloperidol (HALDOL) 5 MG tablet Take 1 tablet (5 mg) by mouth every morning, Disp-30 tablet, R-1, E-Prescribe      hydrOXYzine (ATARAX) 25 MG tablet Take 1 tablet (25 mg) by mouth every 4 hours as needed for anxiety, Disp-45 tablet, R-1, E-Prescribe      OLANZapine (ZYPREXA) 10 MG tablet Take 1 tablet (10 mg) by mouth 3 times daily as needed (agitation and violence), Disp-60 tablet, R-1, E-Prescribe      traZODone (DESYREL) 50 MG tablet Take 1 tablet (50 mg) by mouth nightly as needed for sleep (may repeat after 60 minutes), Disp-30 tablet, R-1, E-Prescribe       !! - Potential duplicate medications found. Please discuss with provider.        CONTINUE these medications which have CHANGED    Details   budesonide-formoterol (SYMBICORT) 160-4.5 MCG/ACT Inhaler Inhale 2 puffs into the lungs 2 times daily, Disp-6 g, R-0, E-Prescribe      fluticasone (FLONASE) 50 MCG/ACT nasal spray Spray 1 spray into both nostrils 2 times daily, Disp-16 g, R-0, E-Prescribe      haloperidol decanoate (HALDOL DECANOATE) 100 MG/ML injection Inject 100 mg into the muscle every 30 days, Disp-1 mL, R-1, E-PrescribeDue on 11/18      senna (SENOKOT) 8.6 MG tablet Take 1 tablet (8.6 mg) by mouth 2 times daily as needed for constipation, Disp-60 tablet, R-0, E-Prescribe      vitamin D2 (ERGOCALCIFEROL) 70905 units (1250 mcg) capsule Take 1 capsule (50,000 Units) by mouth once a week, Disp-4 capsule, R-1, E-Prescribe           STOP taking these medications       fluticasone-vilanterol (BREO ELLIPTA) 200-25 MCG/ACT inhaler Comments:   Reason  for Stopping:         polyethylene glycol (MIRALAX) 17 GM/Dose powder Comments:   Reason for Stopping:                    Psychiatric Examination:   Appearance:  awake, alert and adequately groomed  Attitude:  cooperative  Eye Contact:  fair  Mood:  better  Affect:  constricted mobility  Speech:  clear, coherent  Psychomotor Behavior:  no evidence of tardive dyskinesia, dystonia, or tics  Thought Process:  linear and goal oriented  Associations:  no loose associations  Thought Content:  no evidence of suicidal ideation or homicidal ideation and denies auditory hallucination, see discussion above;  Insight:  partial  Judgment:  fair  Oriented to:  time, person, and place  Attention Span and Concentration:  fair  Recent and Remote Memory:  fair  Language: Able to name objects, Able to repeat phrases, and Able to read and write  Fund of Knowledge: appropriate  Muscle Strength and Tone: normal  Gait and Station: Normal         Discharge Plan:     Health Care Follow-up:      Psychiatry Appointment: Thursday November 9th at 2:00pm  Provider: Dr. Felipe Zamorano [address], [place] [address]  Phone: [phone]  Fax: [phone]     Information will be faxed to your outpatient providers to ensure a healthy continuity of care for you.        Attestation:  The patient has been seen and evaluated by me,  Nic Morgan MD     Total time spent was 37 minutes. Over 50% of times was spent counseling and coordination of care regarding coping skills, medication and discharge planning.

## 2023-11-07 NOTE — PLAN OF CARE
BEH IP Unit Acuity Rating Score (UARS)  Patient is given one point for every criteria they meet.    CRITERIA SCORING   On a 72 hour hold, court hold, committed, stay of commitment, or revocation 1/1    Patient LOS on BEH unit exceeds 20 days 1/1  LOS: 25   Patient under guardianship, 55+, otherwise medically complex, or under age 11 0/1   Suicide ideation without relief of precipitating factors 0/1   Current plan for suicide 0/1   Current plan for homicide 0/1   Imminent risk or actual attempt to seriously harm another without relief of factors precipitating the attempt 0/1   Severe dysfunction in daily living (ex: complete neglect for self care, extreme disruption in vegetative function, extreme deterioration in social interactions) 0/1   Recent (last 7 days) or current physical aggression in the ED or on unit 0/1   Restraints or seclusion episode in past 72 hours 0/1   Recent (last 7 days) or current verbal aggression, agitation, yelling, etc., while in the ED or unit 0/1   Active psychosis 1/1   Need for constant or near constant redirection (from leaving, from others, etc).  0/1   Intrusive or disruptive behaviors 0/1   TOTAL 3

## 2023-11-07 NOTE — PLAN OF CARE
Problem: Sleep Disturbance  Goal: Adequate Sleep/Rest  Outcome: Progressing   Goal Outcome Evaluation:             NOC Shift Report   (11/6/23 into 11/07/23)    Pt in bed at beginning of shift, breathing quiet and unlabored. Pt slept through shift. Pt slept 5.5 hours.     No pt complaints or concerns at this time.     No PRNs requested or given.     Will continue to monitor via Q15 minute safety checks.     Will continue to monitor and assess.

## 2023-11-07 NOTE — PLAN OF CARE
"  Problem: Psychotic Signs/Symptoms  Goal: Improved Mood Symptoms (Psychotic Signs/Symptoms)  Outcome: Met     Problem: Suicidal Behavior  Goal: Suicidal Behavior is Absent or Managed  Outcome: Met     Pt denies anxiety and depression. He denies SI/SIB/HI/AH/VH. Pt appears to be responding to internal stimuli at times - pt observed talking to himself on occasion. Pt contracts for safety. Pt denies pain. VSS. A/O.     Pt presents with a flat affect. Pt does brighten on approach. Pt observed to be calm and cooperative. Pt observed to be pleasant and polite. Pt's speech observed to be clear and coherent. Eye contact observed to be appropriate. Pt observed to be guarded. Pt observed to be withdrawn. Pt keeps to himself but will speak with peers if they engage him first.     Pt visible on the unit this morning attending breakfast, watching television, and listening to headphones. Pt reports he is \"happy\" to be discharging today and was smiling when talking about discharge plans with writer. Pt reports he is happy that the [facility] he is going to will be close to his family. Pt reports he is looking forward to eating [employer]. He showered this morning. Pt aware that he will be picked up @ 1030 - discharge order placed. Pt's haldol decanoate injection was not ready in time and will need to be couriered to facility - writer informed pt of this and per CTC, IRTS was also updated on this. Writer will continue to follow-up with discharge pharmacy to ensure injection gets sent. Pt is medication compliant. He denies any medication SE at this time. He denies any additional questions or concerns at this time. He continues to have a no roommate order. He remains on assault and SIB precautions - no associated behaviors noted this shift. Will continue to monitor and assist as needed.   "

## 2023-11-07 NOTE — PLAN OF CARE
Problem: Psychotic Signs/Symptoms  Goal: Improved Behavioral Control (Psychotic Signs/Symptoms)  11/6/2023 2039 by Melissa Gaitan RN  Outcome: Progressing   Goal Outcome Evaluation:    Plan of Care Reviewed With: patient      Moe has been in his room the majority of the shift. He has spent time sitting on the floor of his room talking to himself. He did come out of his room for dinner and watched some television with his ear phones on. The majority of the time he keeps to himself, does not socialize with others. He is looking forward to discharge tomorrow morning. He has been medication compliant with scheduled medications. Moe continues on SIB and Assault precautions. Nursing to continue to support current plan of care.

## 2023-11-07 NOTE — PLAN OF CARE
Team Note Due:  Monday    Assessment/Intervention/Current Symtoms and Care Coordination:  Chart review and met with team, discussed pt progress, symptomology, and response to treatment.  Discussed the discharge plan and any potential impediments to discharge.    Tasks Completed:  - Transportation for pt's I[facility] came and left before pt made it to the cab. CTC called and reordered transportation request to I[facility].  - Called [facility] to notify them that pt is awaiting on transportation and will be late to the program.    Discharge Plan or Goal:  Pt discharged to [facility] this morning with outpatient supports.     Barriers to Discharge:  None.     Referral Status:  Pt has psychiatrist in the community and is receiving home health services for support with injections and medication management. Follow-up psychiatry appointment has been scheduled - AVS updated.    [facility] made referrals to the following IRTS programs:  [facility] Central Intake: Submitted 10/27/2023  Contact: Pilar Spain   Direct dial: [phone]/fax [phone]  Direct email: [email or web address]  General Email: [email or web address]  Includes:   [facility]  [facility]  [facility] [place]  [facility]  [facility]  [facility]  [facility]   11/3/2023: Called and left message to confirm receipt of referral and to determine whether there are any current openings.    [facility]: Submitted 10/27/2023  [facility]: [phone]  Fax: [phone]  11/3/2023: Called and spoke to Nora to check status of referral. Noar is still reviewing but is hopeful to follow-up later today. Nora emailed asking for updated progress notes and insurance information. Sent via secure email and included progress notes from 10/31, 11/1, and 11/2.     [facility]: Submitted  10/27/2023  Phone: [phone]   Fax: [phone]  10/30/2023: Received voicemail from Kwasi noting receipt of referral. Kwasi reports that they are out about 3-4 weeks and they will add referral to list.     [facility]: Submitted 10/27/2023  Phone: [phone]    Fax: [phone]   email: [email or web address]  10/27/2023: Confirmed receipt of referral. No current openings but may have some coming up. Estimated between 2-3 weeks. Civil commitment paperwork can be shared once further in the admission process.  11/3/2023: Called to check the status of the referral. Informed that pt is under review with the [facility]. Provided the name and number for the clinical director: Kunriley - [phone]. Called and left a message for Jaison. Jaison called back and shared that they have openings. Jaison will call pt today for an interview. Pt was cleared for admission. Tentative admission date for Tuesday, November 7.   11/6/2023: Admission paperwork submitted to the program. Admission is scheduled for 11 AM on Tuesday, November 7. Transportation will pick pt up at 10:30 to bring to the program.     Legal Status:  [place] Commitment and [place] Order (PD revoked)   County: [facility]  File Number: [id number]  Start and expiration date of commitment: Start date is 9/28/2023   Bolanos Ordered Medications: Zyprexa, Haldol, Risperdal, Invega, Clozaril, Thorazine    Contacts:  Medication Management/Psychiatry:  Name/Clinic: Dr. Felipe Zamorano [facility]   Phone: [phone]    [facility] on 10/6/2023 Nicola Carias RN      /ACT Team:  Name: Maryam Miranda  Email: [email or web address]  Phone: [phone]      Family Contacts:   Name: Angeles Rosales - mother (BUTCH on file)  Phone: [phone]    Name: Jostin Doe - father (BUTCH on file)  Phone: [phone]     Upcoming Meetings and Dates/Important Information and next steps:  None

## 2023-11-07 NOTE — PLAN OF CARE
Care Coordinator Note(s):    Care Request(s):   Transportation  -   Preferences: 23 @10:30 AM  Notes:  to [facility]    Care Outcome(s):    Transportation      Address: [facility]: [address] [place] [address], [id number]  Date/ Time: 23 @ 10:30 AM    Drop off  Address: [place] [address]    Insurance: [facility]  Phone: [phone]  Transportation company: [employer]  Phone: [phone]  Confirmation #: [id number]  Call on arrival:[address]: [phone]      Further Actions:  None.    -Lamar Caceres  Adult Behavioral Health Care Coordinator

## 2024-11-16 ENCOUNTER — HOSPITAL ENCOUNTER (EMERGENCY)
Facility: CLINIC | Age: 24
Discharge: HOME | End: 2024-11-16
Attending: EMERGENCY MEDICINE | Admitting: EMERGENCY MEDICINE
Payer: COMMERCIAL

## 2024-11-16 VITALS
OXYGEN SATURATION: 98 % | BODY MASS INDEX: 31.51 KG/M2 | SYSTOLIC BLOOD PRESSURE: 124 MMHG | RESPIRATION RATE: 18 BRPM | WEIGHT: 272.71 LBS | DIASTOLIC BLOOD PRESSURE: 73 MMHG | HEART RATE: 86 BPM | TEMPERATURE: 97.3 F

## 2024-11-16 DIAGNOSIS — Z71.1 WORRIED WELL: ICD-10-CM

## 2024-11-16 LAB — GLUCOSE BLDC GLUCOMTR-MCNC: 86 MG/DL (ref 70–99)

## 2024-11-16 PROCEDURE — 99283 EMERGENCY DEPT VISIT LOW MDM: CPT

## 2024-11-16 PROCEDURE — 82962 GLUCOSE BLOOD TEST: CPT

## 2024-11-16 ASSESSMENT — COLUMBIA-SUICIDE SEVERITY RATING SCALE - C-SSRS
1. IN THE PAST MONTH, HAVE YOU WISHED YOU WERE DEAD OR WISHED YOU COULD GO TO SLEEP AND NOT WAKE UP?: NO
2. HAVE YOU ACTUALLY HAD ANY THOUGHTS OF KILLING YOURSELF IN THE PAST MONTH?: NO
6. HAVE YOU EVER DONE ANYTHING, STARTED TO DO ANYTHING, OR PREPARED TO DO ANYTHING TO END YOUR LIFE?: NO

## 2024-11-16 NOTE — ED TRIAGE NOTES
Patient states that he fell down and now has a headache. Denies hitting head, states he fell on his buttocks but wants to make sure nothing is wrong. Complaints are vague. Mother states he was sick a few days ago and dizzy, patient denies any sickness today. Mother states he drinks to much water. ABCs intact. VSS.

## 2024-11-16 NOTE — ED PROVIDER NOTES
History     Chief Complaint:  Fall and Dizziness       HPI   Moe Doe is a 23 year old male his first chief complaints were can I get apple juice a sandwich and a bag of chips.  Not ill or sick appearing.  Felt congestion last couple of days.  Purely fell on buttocks not passing out and wanted to make sure everything is ok.  Has schizophrenia.        Independent Historian:    Mom    Review of External Notes:      Medications:    budesonide-formoterol (SYMBICORT) 160-4.5 MCG/ACT Inhaler  fluticasone (FLONASE) 50 MCG/ACT nasal spray  haloperidol (HALDOL) 10 MG tablet  haloperidol (HALDOL) 5 MG tablet  haloperidol decanoate (HALDOL DECANOATE) 100 MG/ML injection  hydrOXYzine (ATARAX) 25 MG tablet  OLANZapine (ZYPREXA) 10 MG tablet  senna (SENOKOT) 8.6 MG tablet  traZODone (DESYREL) 50 MG tablet  vitamin D2 (ERGOCALCIFEROL) 24825 units (1250 mcg) capsule        Past Medical History:    Past Medical History:   Diagnosis Date    Uncomplicated asthma        Past Surgical History:    Past Surgical History:   Procedure Laterality Date    ENT SURGERY            Physical Exam   Patient Vitals for the past 24 hrs:   BP Temp Temp src Pulse Resp SpO2 Weight   11/16/24 0056 124/73 97.3  F (36.3  C) Temporal 86 18 98 % 123.7 kg (272 lb 11.3 oz)        Physical Exam  General: Patient is well appearing. No distress. Head to toe trauma normal.   Head: Atraumatic.  Eyes: Conjunctivae and EOM are normal. No scleral icterus.  Pupils equal.  Ears and throat normal.    Neck: Normal range of motion. Neck supple. No midline tenderness.   Cardiovascular: Normal rate, regular rhythm, normal heart sounds and intact distal pulses.   Pulmonary/Chest: Breath sounds normal. No respiratory distress.  Abdominal: Soft. Bowel sounds are normal. No distension. No tenderness. No rebound or guarding.   Musculoskeletal: Normal range of motion.  Skin: Warm and dry. No rash noted. Not diaphoretic.      Emergency Department Course    ECG      Imaging:  No orders to display       Laboratory:  Labs Ordered and Resulted from Time of ED Arrival to Time of ED Departure - No data to display   [facility] 86  Procedures       Emergency Department Course & Assessments:    Interventions:  Medications - No data to display     Assessments:      Independent Interpretation (X-rays, CTs, rhythm strip):      Consultations/Discussion of Management or Tests:         Social Drivers of Health affecting care:       Disposition:  The patient was discharged.    Impression & Plan         Medical Decision Making:  Pt has no focal complaints.  Schizophrenia paranoid.  This does not sound like syncope.  He has completely normal vitals and exam and does not appear ill.  [facility] 86 needs PCP annual checkup reviewed extensive labs from annual one year ago and no abnl.  Strict return and follow up given.     Diagnosis:    ICD-10-CM    1. Worried well  Z71.1            Discharge Medications:  New Prescriptions    No medications on file            11/16/2024   Rodrigo Childs MD Stevens, Andrew C, MD  11/16/24 0133

## 2024-11-21 ENCOUNTER — OFFICE VISIT (OUTPATIENT)
Dept: FAMILY MEDICINE | Facility: CLINIC | Age: 24
End: 2024-11-21
Payer: COMMERCIAL

## 2024-11-21 VITALS
WEIGHT: 275 LBS | TEMPERATURE: 98.9 F | HEART RATE: 75 BPM | HEIGHT: 78 IN | OXYGEN SATURATION: 98 % | SYSTOLIC BLOOD PRESSURE: 120 MMHG | RESPIRATION RATE: 16 BRPM | BODY MASS INDEX: 31.82 KG/M2 | DIASTOLIC BLOOD PRESSURE: 74 MMHG

## 2024-11-21 DIAGNOSIS — J45.20 MILD INTERMITTENT ASTHMA WITHOUT COMPLICATION: ICD-10-CM

## 2024-11-21 DIAGNOSIS — F20.0 PARANOID SCHIZOPHRENIA (H): Primary | ICD-10-CM

## 2024-11-21 RX ORDER — BUDESONIDE AND FORMOTEROL FUMARATE DIHYDRATE 160; 4.5 UG/1; UG/1
2 AEROSOL RESPIRATORY (INHALATION) 2 TIMES DAILY
Qty: 10.2 G | Refills: 1 | Status: SHIPPED | OUTPATIENT
Start: 2024-11-21

## 2024-11-21 ASSESSMENT — ASTHMA QUESTIONNAIRES
ACT_TOTALSCORE: 25
ACT_TOTALSCORE: 25
QUESTION_2 LAST FOUR WEEKS HOW OFTEN HAVE YOU HAD SHORTNESS OF BREATH: NOT AT ALL
QUESTION_5 LAST FOUR WEEKS HOW WOULD YOU RATE YOUR ASTHMA CONTROL: COMPLETELY CONTROLLED
HOSPITALIZATION_OVERNIGHT_LAST_YEAR_TOTAL: ONE
QUESTION_1 LAST FOUR WEEKS HOW MUCH OF THE TIME DID YOUR ASTHMA KEEP YOU FROM GETTING AS MUCH DONE AT WORK, SCHOOL OR AT HOME: NONE OF THE TIME
QUESTION_4 LAST FOUR WEEKS HOW OFTEN HAVE YOU USED YOUR RESCUE INHALER OR NEBULIZER MEDICATION (SUCH AS ALBUTEROL): NOT AT ALL
QUESTION_3 LAST FOUR WEEKS HOW OFTEN DID YOUR ASTHMA SYMPTOMS (WHEEZING, COUGHING, SHORTNESS OF BREATH, CHEST TIGHTNESS OR PAIN) WAKE YOU UP AT NIGHT OR EARLIER THAN USUAL IN THE MORNING: NOT AT ALL

## 2024-11-21 NOTE — PATIENT INSTRUCTIONS
Refill inhaler today     Follow up at upcoming appointment w/ Dr. Nixon in January for physical.

## 2024-11-21 NOTE — PROGRESS NOTES
"  Assessment & Plan     (F20.0) Paranoid schizophrenia (H)  (primary encounter diagnosis)  Comment: Unclear of patient's current concerns today. Patient does have history of paranoid schizophrenia, has not seen psychiatry in some time.  However, discussed with patient to ensure that he is safe and has adequate support from home.  Patient states that he does.   He denies taking medications at this time except for his Symbicort inhaler which he requests to be refilled today.  I see that he was on multiple psychiatry meds, does not seem that he has been taking.  Patient's demeanor today was calm did not show any concerns of aggressive behavior or intense paranoia.  Patient has not seen his stated PCP for some time nor his psychiatrist, however he is scheduled to see his PCP in January.  Discussed with patient to keep this appointment to reestablish with Dr. Nixon.  Parents not available at appointment today, unsure about patient's food or housing security outside of clinic given history.  However, patient states he lives with his mother.  Recommend that if patient does not show up to January appointment that we reach out to him or his parents for wellness check.     (J45.20) Mild intermittent asthma without complication  Comment: Symbicort refilled.  No exacerbation today, patient has no complaints of asthma being up problem as of late.  Plan: budesonide-formoterol (SYMBICORT) 160-4.5         MCG/ACT Inhaler     The longitudinal plan of care for the diagnosis(es)/condition(s) as documented were addressed during this visit. Due to the added complexity in care, I will continue to support Moe in the subsequent management and with ongoing continuity of care.    BMI  Estimated body mass index is 31.78 kg/m  as calculated from the following:    Height as of this encounter: 1.981 m (6' 6\").    Weight as of this encounter: 124.7 kg (275 lb).     Alfonso Rosa is a 23 year old, presenting for the following health " "issues:  Recheck Medication        11/21/2024     7:41 AM   Additional Questions   Roomed by Dawn PATRICIO MA     History of Present Illness       Reason for visit:  Check up He is missing 1 dose(s) of medications per week.     Medication Followup of Haldol  Taking Medication as prescribed: yes  Side Effects:  None  Medication Helping Symptoms:  yes      Review of Systems  Constitutional, HEENT, cardiovascular, pulmonary, GI, , musculoskeletal, neuro, skin, endocrine and psych systems are negative, except as otherwise noted.      Objective    /74 (BP Location: Right arm, Patient Position: Sitting, Cuff Size: Adult Large)   Pulse 75   Temp 98.9  F (37.2  C) (Temporal)   Resp 16   Ht 1.981 m (6' 6\")   Wt 124.7 kg (275 lb)   SpO2 98%   BMI 31.78 kg/m    Body mass index is 31.78 kg/m .  Physical Exam  Vitals and nursing note reviewed.   Constitutional:       General: He is not in acute distress.     Appearance: Normal appearance.   Cardiovascular:      Rate and Rhythm: Normal rate and regular rhythm.      Heart sounds: No murmur heard.     No friction rub. No gallop.   Pulmonary:      Effort: Pulmonary effort is normal. No respiratory distress.      Breath sounds: No wheezing, rhonchi or rales.   Abdominal:      General: Bowel sounds are normal. There is no distension.      Palpations: Abdomen is soft.      Tenderness: There is no abdominal tenderness. There is no guarding.   Skin:     General: Skin is warm and dry.   Neurological:      Mental Status: He is alert.   Psychiatric:         Mood and Affect: Mood normal. Mood is not anxious. Affect is flat. Affect is not angry.         Speech: Speech normal.         Behavior: Behavior normal. Behavior is not agitated or withdrawn. Behavior is cooperative.         Thought Content: Thought content normal. Thought content is not paranoid.         Judgment: Judgment normal. Judgment is not impulsive or inappropriate.                    Signed Electronically by: Alex" ROB Bee CNP